# Patient Record
Sex: FEMALE | Race: BLACK OR AFRICAN AMERICAN | Employment: FULL TIME
[De-identification: names, ages, dates, MRNs, and addresses within clinical notes are randomized per-mention and may not be internally consistent; named-entity substitution may affect disease eponyms.]

---

## 2017-01-06 ENCOUNTER — OFFICE VISIT (OUTPATIENT)
Dept: FAMILY MEDICINE CLINIC | Facility: CLINIC | Age: 37
End: 2017-01-06

## 2017-01-06 VITALS
HEART RATE: 71 BPM | BODY MASS INDEX: 34.37 KG/M2 | SYSTOLIC BLOOD PRESSURE: 112 MMHG | WEIGHT: 194 LBS | DIASTOLIC BLOOD PRESSURE: 70 MMHG

## 2017-01-06 DIAGNOSIS — G43.909 MIGRAINE WITHOUT STATUS MIGRAINOSUS, NOT INTRACTABLE, UNSPECIFIED MIGRAINE TYPE: Primary | ICD-10-CM

## 2017-01-06 PROCEDURE — 99213 OFFICE O/P EST LOW 20 MIN: CPT | Performed by: FAMILY MEDICINE

## 2017-01-06 RX ORDER — METOPROLOL SUCCINATE 50 MG/1
50 TABLET, EXTENDED RELEASE ORAL DAILY
Qty: 30 TABLET | Refills: 3 | Status: SHIPPED | OUTPATIENT
Start: 2017-01-06 | End: 2017-02-06 | Stop reason: SDUPTHER

## 2017-01-06 ASSESSMENT — ENCOUNTER SYMPTOMS
BACK PAIN: 0
SINUS PRESSURE: 0
SHORTNESS OF BREATH: 0
SORE THROAT: 0
DIARRHEA: 0
COUGH: 0
VOMITING: 0
NAUSEA: 0

## 2017-02-06 RX ORDER — METOPROLOL SUCCINATE 50 MG/1
50 TABLET, EXTENDED RELEASE ORAL DAILY
Qty: 30 TABLET | Refills: 11 | Status: SHIPPED | OUTPATIENT
Start: 2017-02-06 | End: 2018-02-17

## 2017-03-08 ENCOUNTER — OFFICE VISIT (OUTPATIENT)
Dept: OBGYN | Facility: CLINIC | Age: 37
End: 2017-03-08

## 2017-03-08 ENCOUNTER — HOSPITAL ENCOUNTER (OUTPATIENT)
Age: 37
Setting detail: SPECIMEN
Discharge: HOME | End: 2017-03-08
Payer: COMMERCIAL

## 2017-03-08 VITALS
WEIGHT: 190 LBS | HEART RATE: 74 BPM | HEIGHT: 63 IN | BODY MASS INDEX: 33.66 KG/M2 | DIASTOLIC BLOOD PRESSURE: 75 MMHG | SYSTOLIC BLOOD PRESSURE: 110 MMHG

## 2017-03-08 DIAGNOSIS — Z01.419 ENCOUNTER FOR GYNECOLOGICAL EXAMINATION WITHOUT ABNORMAL FINDING: Primary | ICD-10-CM

## 2017-03-08 PROCEDURE — 99385 PREV VISIT NEW AGE 18-39: CPT | Performed by: OBSTETRICS & GYNECOLOGY

## 2017-03-08 ASSESSMENT — ENCOUNTER SYMPTOMS
BLURRED VISION: 0
COUGH: 0
PHOTOPHOBIA: 0
ABDOMINAL PAIN: 0
SHORTNESS OF BREATH: 0
HEARTBURN: 0

## 2017-03-14 LAB
HPV SAMPLE: NORMAL
HPV SOURCE: NORMAL
HPV, GENOTYPE 16: NOT DETECTED
HPV, GENOTYPE 18: NOT DETECTED
HPV, HIGH RISK OTHER: NOT DETECTED
HPV, INTERPRETATION: NORMAL

## 2017-03-15 LAB — CYTOLOGY REPORT: NORMAL

## 2017-03-22 ENCOUNTER — OFFICE VISIT (OUTPATIENT)
Dept: FAMILY MEDICINE CLINIC | Age: 37
End: 2017-03-22
Payer: COMMERCIAL

## 2017-03-22 VITALS
WEIGHT: 199 LBS | HEART RATE: 58 BPM | BODY MASS INDEX: 35.82 KG/M2 | SYSTOLIC BLOOD PRESSURE: 120 MMHG | DIASTOLIC BLOOD PRESSURE: 70 MMHG

## 2017-03-22 DIAGNOSIS — R94.31 ABNORMAL EKG: ICD-10-CM

## 2017-03-22 DIAGNOSIS — R07.9 CHEST PAIN, UNSPECIFIED TYPE: Primary | ICD-10-CM

## 2017-03-22 PROCEDURE — G8417 CALC BMI ABV UP PARAM F/U: HCPCS | Performed by: FAMILY MEDICINE

## 2017-03-22 PROCEDURE — 1036F TOBACCO NON-USER: CPT | Performed by: FAMILY MEDICINE

## 2017-03-22 PROCEDURE — 93000 ELECTROCARDIOGRAM COMPLETE: CPT | Performed by: FAMILY MEDICINE

## 2017-03-22 PROCEDURE — G8427 DOCREV CUR MEDS BY ELIG CLIN: HCPCS | Performed by: FAMILY MEDICINE

## 2017-03-22 PROCEDURE — 99212 OFFICE O/P EST SF 10 MIN: CPT | Performed by: FAMILY MEDICINE

## 2017-03-22 PROCEDURE — G8484 FLU IMMUNIZE NO ADMIN: HCPCS | Performed by: FAMILY MEDICINE

## 2017-03-23 ENCOUNTER — HOSPITAL ENCOUNTER (OUTPATIENT)
Dept: GENERAL RADIOLOGY | Age: 37
Discharge: HOME | End: 2017-03-23
Payer: COMMERCIAL

## 2017-03-23 ENCOUNTER — HOSPITAL ENCOUNTER (OUTPATIENT)
Age: 37
Discharge: HOME | End: 2017-03-23
Payer: COMMERCIAL

## 2017-03-23 DIAGNOSIS — R07.9 CHEST PAIN, UNSPECIFIED TYPE: ICD-10-CM

## 2017-03-23 DIAGNOSIS — R94.31 ABNORMAL EKG: ICD-10-CM

## 2017-03-23 PROCEDURE — 71020 XR CHEST STANDARD TWO VW: CPT

## 2017-03-28 ENCOUNTER — TELEPHONE (OUTPATIENT)
Dept: FAMILY MEDICINE CLINIC | Age: 37
End: 2017-03-28

## 2017-03-28 DIAGNOSIS — R07.9 CHEST PAIN, UNSPECIFIED TYPE: Primary | ICD-10-CM

## 2017-03-31 ENCOUNTER — HOSPITAL ENCOUNTER (OUTPATIENT)
Dept: NUCLEAR MEDICINE | Age: 37
Discharge: HOME | End: 2017-03-31
Payer: COMMERCIAL

## 2017-03-31 ENCOUNTER — HOSPITAL ENCOUNTER (OUTPATIENT)
Dept: NON INVASIVE DIAGNOSTICS | Age: 37
Discharge: HOME | End: 2017-03-31
Payer: COMMERCIAL

## 2017-03-31 DIAGNOSIS — R07.9 CHEST PAIN, UNSPECIFIED TYPE: ICD-10-CM

## 2017-03-31 DIAGNOSIS — R94.31 ABNORMAL EKG: ICD-10-CM

## 2017-03-31 LAB
LV EF: 56 %
LVEF MODALITY: NORMAL

## 2017-03-31 PROCEDURE — 93017 CV STRESS TEST TRACING ONLY: CPT | Performed by: NURSE PRACTITIONER

## 2017-03-31 PROCEDURE — 78452 HT MUSCLE IMAGE SPECT MULT: CPT

## 2017-03-31 PROCEDURE — 3430000000 HC RX DIAGNOSTIC RADIOPHARMACEUTICAL: Performed by: FAMILY MEDICINE

## 2017-03-31 PROCEDURE — 6360000002 HC RX W HCPCS: Performed by: FAMILY MEDICINE

## 2017-03-31 PROCEDURE — A9500 TC99M SESTAMIBI: HCPCS | Performed by: FAMILY MEDICINE

## 2017-03-31 RX ORDER — SODIUM CHLORIDE 0.9 % (FLUSH) 0.9 %
10 SYRINGE (ML) INJECTION PRN
Status: DISCONTINUED | OUTPATIENT
Start: 2017-03-31 | End: 2017-03-31

## 2017-03-31 RX ORDER — NITROGLYCERIN 0.4 MG/1
0.4 TABLET SUBLINGUAL EVERY 5 MIN PRN
Status: DISCONTINUED | OUTPATIENT
Start: 2017-03-31 | End: 2017-03-31

## 2017-03-31 RX ORDER — SODIUM CHLORIDE 9 MG/ML
INJECTION, SOLUTION INTRAVENOUS ONCE
Status: COMPLETED | OUTPATIENT
Start: 2017-03-31 | End: 2017-03-31

## 2017-03-31 RX ORDER — AMINOPHYLLINE DIHYDRATE 25 MG/ML
100 INJECTION, SOLUTION INTRAVENOUS
Status: COMPLETED | OUTPATIENT
Start: 2017-03-31 | End: 2017-03-31

## 2017-03-31 RX ORDER — SODIUM CHLORIDE 0.9 % (FLUSH) 0.9 %
10 SYRINGE (ML) INJECTION 2 TIMES DAILY
Status: DISCONTINUED | OUTPATIENT
Start: 2017-03-31 | End: 2017-04-03 | Stop reason: HOSPADM

## 2017-03-31 RX ADMIN — SODIUM CHLORIDE: 9 INJECTION, SOLUTION INTRAVENOUS at 10:16

## 2017-03-31 RX ADMIN — REGADENOSON 0.4 MG: 0.08 INJECTION, SOLUTION INTRAVENOUS at 10:32

## 2017-03-31 RX ADMIN — TETRAKIS(2-METHOXYISOBUTYLISOCYANIDE)COPPER(I) TETRAFLUOROBORATE 42 MILLICURIE: 1 INJECTION, POWDER, LYOPHILIZED, FOR SOLUTION INTRAVENOUS at 12:09

## 2017-03-31 RX ADMIN — AMINOPHYLLINE 100 MG: 25 INJECTION, SOLUTION INTRAVENOUS at 10:35

## 2017-03-31 RX ADMIN — TETRAKIS(2-METHOXYISOBUTYLISOCYANIDE)COPPER(I) TETRAFLUOROBORATE 14.6 MILLICURIE: 1 INJECTION, POWDER, LYOPHILIZED, FOR SOLUTION INTRAVENOUS at 12:09

## 2017-03-31 RX ADMIN — Medication 10 ML: at 10:16

## 2017-04-04 ENCOUNTER — OFFICE VISIT (OUTPATIENT)
Dept: FAMILY MEDICINE CLINIC | Age: 37
End: 2017-04-04
Payer: COMMERCIAL

## 2017-04-04 VITALS
SYSTOLIC BLOOD PRESSURE: 120 MMHG | DIASTOLIC BLOOD PRESSURE: 70 MMHG | HEART RATE: 76 BPM | BODY MASS INDEX: 35.1 KG/M2 | WEIGHT: 195 LBS

## 2017-04-04 DIAGNOSIS — R07.9 CHEST PAIN, UNSPECIFIED TYPE: ICD-10-CM

## 2017-04-04 DIAGNOSIS — G44.209 ACUTE NON INTRACTABLE TENSION-TYPE HEADACHE: Primary | ICD-10-CM

## 2017-04-04 PROCEDURE — 1036F TOBACCO NON-USER: CPT | Performed by: FAMILY MEDICINE

## 2017-04-04 PROCEDURE — G8417 CALC BMI ABV UP PARAM F/U: HCPCS | Performed by: FAMILY MEDICINE

## 2017-04-04 PROCEDURE — 99213 OFFICE O/P EST LOW 20 MIN: CPT | Performed by: FAMILY MEDICINE

## 2017-04-04 PROCEDURE — G8427 DOCREV CUR MEDS BY ELIG CLIN: HCPCS | Performed by: FAMILY MEDICINE

## 2017-04-04 RX ORDER — IBUPROFEN 800 MG/1
800 TABLET ORAL EVERY 8 HOURS PRN
Qty: 60 TABLET | Refills: 3 | Status: SHIPPED | OUTPATIENT
Start: 2017-04-04 | End: 2017-07-07 | Stop reason: SDUPTHER

## 2017-04-04 ASSESSMENT — ENCOUNTER SYMPTOMS
CHEST TIGHTNESS: 1
SHORTNESS OF BREATH: 1
VOMITING: 0
DIARRHEA: 0
COUGH: 0
SINUS PRESSURE: 0
NAUSEA: 0
BACK PAIN: 0
PHOTOPHOBIA: 1
SORE THROAT: 0

## 2017-04-07 ENCOUNTER — HOSPITAL ENCOUNTER (OUTPATIENT)
Dept: NON INVASIVE DIAGNOSTICS | Age: 37
Discharge: HOME | End: 2017-04-07
Payer: COMMERCIAL

## 2017-04-07 LAB
LV EF: 60 %
LVEF MODALITY: NORMAL

## 2017-04-07 PROCEDURE — 93306 TTE W/DOPPLER COMPLETE: CPT

## 2017-05-09 ENCOUNTER — OFFICE VISIT (OUTPATIENT)
Dept: OBGYN CLINIC | Age: 37
End: 2017-05-09
Payer: COMMERCIAL

## 2017-05-09 VITALS
HEART RATE: 62 BPM | SYSTOLIC BLOOD PRESSURE: 104 MMHG | BODY MASS INDEX: 34.96 KG/M2 | WEIGHT: 190 LBS | HEIGHT: 62 IN | DIASTOLIC BLOOD PRESSURE: 72 MMHG

## 2017-05-09 DIAGNOSIS — R10.2 PELVIC PAIN IN FEMALE: Primary | ICD-10-CM

## 2017-05-09 PROCEDURE — 99213 OFFICE O/P EST LOW 20 MIN: CPT | Performed by: OBSTETRICS & GYNECOLOGY

## 2017-05-09 PROCEDURE — G8417 CALC BMI ABV UP PARAM F/U: HCPCS | Performed by: OBSTETRICS & GYNECOLOGY

## 2017-05-09 PROCEDURE — G8427 DOCREV CUR MEDS BY ELIG CLIN: HCPCS | Performed by: OBSTETRICS & GYNECOLOGY

## 2017-05-09 PROCEDURE — 1036F TOBACCO NON-USER: CPT | Performed by: OBSTETRICS & GYNECOLOGY

## 2017-07-07 DIAGNOSIS — R07.9 CHEST PAIN, UNSPECIFIED TYPE: ICD-10-CM

## 2017-07-07 RX ORDER — IBUPROFEN 800 MG/1
TABLET ORAL
Qty: 90 TABLET | Refills: 5 | Status: SHIPPED | OUTPATIENT
Start: 2017-07-07 | End: 2017-11-01 | Stop reason: SDUPTHER

## 2017-10-31 ENCOUNTER — OFFICE VISIT (OUTPATIENT)
Dept: FAMILY MEDICINE CLINIC | Age: 37
End: 2017-10-31
Payer: COMMERCIAL

## 2017-10-31 VITALS
DIASTOLIC BLOOD PRESSURE: 80 MMHG | HEART RATE: 111 BPM | BODY MASS INDEX: 35.12 KG/M2 | TEMPERATURE: 98.5 F | SYSTOLIC BLOOD PRESSURE: 130 MMHG | WEIGHT: 192 LBS

## 2017-10-31 DIAGNOSIS — B34.9 VIRAL SYNDROME: Primary | ICD-10-CM

## 2017-10-31 PROCEDURE — G8417 CALC BMI ABV UP PARAM F/U: HCPCS | Performed by: FAMILY MEDICINE

## 2017-10-31 PROCEDURE — 99213 OFFICE O/P EST LOW 20 MIN: CPT | Performed by: FAMILY MEDICINE

## 2017-10-31 PROCEDURE — G8427 DOCREV CUR MEDS BY ELIG CLIN: HCPCS | Performed by: FAMILY MEDICINE

## 2017-10-31 PROCEDURE — G8484 FLU IMMUNIZE NO ADMIN: HCPCS | Performed by: FAMILY MEDICINE

## 2017-10-31 PROCEDURE — 1036F TOBACCO NON-USER: CPT | Performed by: FAMILY MEDICINE

## 2017-10-31 RX ORDER — OSELTAMIVIR PHOSPHATE 75 MG/1
75 CAPSULE ORAL 2 TIMES DAILY
Qty: 20 CAPSULE | Refills: 0 | Status: SHIPPED | OUTPATIENT
Start: 2017-10-31 | End: 2017-11-10

## 2017-10-31 ASSESSMENT — ENCOUNTER SYMPTOMS
SORE THROAT: 0
BACK PAIN: 0
NAUSEA: 0
DIARRHEA: 0
VOMITING: 0
COUGH: 0
SHORTNESS OF BREATH: 0
SINUS PRESSURE: 0

## 2017-10-31 ASSESSMENT — PATIENT HEALTH QUESTIONNAIRE - PHQ9
1. LITTLE INTEREST OR PLEASURE IN DOING THINGS: 0
SUM OF ALL RESPONSES TO PHQ9 QUESTIONS 1 & 2: 0
SUM OF ALL RESPONSES TO PHQ QUESTIONS 1-9: 0
2. FEELING DOWN, DEPRESSED OR HOPELESS: 0

## 2017-10-31 NOTE — PROGRESS NOTES
bruit is not present. Cardiovascular: Exam reveals no gallop and no friction rub. No murmur heard. Pulmonary/Chest: No respiratory distress. She has no wheezes. She has no rales. She exhibits no tenderness. Musculoskeletal: She exhibits no edema. Lymphadenopathy:     She has no cervical adenopathy. Neurological: She is alert and oriented to person, place, and time. No cranial nerve deficit. Skin: No rash noted. She is not diaphoretic. Psychiatric: She has a normal mood and affect. Her behavior is normal. Judgment and thought content normal.       Assessment:      1. Viral syndrome  oseltamivir (TAMIFLU) 75 MG capsule    HYDROcodone-homatropine (HYCODAN) 5-1.5 MG/5ML syrup         Plan:      No Follow-up on file. No orders of the defined types were placed in this encounter. Orders Placed This Encounter   Medications    oseltamivir (TAMIFLU) 75 MG capsule     Sig: Take 1 capsule by mouth 2 times daily for 10 days     Dispense:  20 capsule     Refill:  0    HYDROcodone-homatropine (HYCODAN) 5-1.5 MG/5ML syrup     Sig: Take 5 mLs by mouth every 6 hours as needed (cough) .      Dispense:  180 mL     Refill:  0

## 2017-11-01 DIAGNOSIS — R07.9 CHEST PAIN, UNSPECIFIED TYPE: ICD-10-CM

## 2017-11-01 RX ORDER — IBUPROFEN 800 MG/1
TABLET ORAL
Qty: 90 TABLET | Refills: 1 | Status: SHIPPED | OUTPATIENT
Start: 2017-11-01 | End: 2017-12-06 | Stop reason: SDUPTHER

## 2017-11-01 NOTE — TELEPHONE ENCOUNTER
Health Maintenance   Topic Date Due    HIV screen  03/19/1994    DTaP/Tdap/Td vaccine (1 - Tdap) 03/19/1998    Flu vaccine (1) 09/01/2020 (Originally 9/1/2017)    Cervical cancer screen  03/08/2022       Hemoglobin A1C (%)   Date Value   02/07/2012 5.5             ( goal A1C is < 7)   No results found for: LABMICR  LDL Cholesterol (mg/dL)   Date Value   12/07/2016 119       (goal LDL is <100)   AST (U/L)   Date Value   12/07/2016 11     ALT (U/L)   Date Value   12/07/2016 8     BUN (mg/dL)   Date Value   12/07/2016 7     BP Readings from Last 3 Encounters:   10/31/17 130/80   05/09/17 104/72   04/04/17 120/70          (goal 120/80)    All Future Testing planned in [facility]  Lab Frequency Next Occurrence       Next Visit Date:  No future appointments. There is no problem list on file for this patient.

## 2017-11-02 ENCOUNTER — TELEPHONE (OUTPATIENT)
Dept: FAMILY MEDICINE CLINIC | Age: 37
End: 2017-11-02

## 2017-11-21 ENCOUNTER — TELEPHONE (OUTPATIENT)
Dept: FAMILY MEDICINE CLINIC | Age: 37
End: 2017-11-21

## 2017-12-06 DIAGNOSIS — R07.9 CHEST PAIN, UNSPECIFIED TYPE: ICD-10-CM

## 2017-12-06 RX ORDER — IBUPROFEN 800 MG/1
TABLET ORAL
Qty: 90 TABLET | Refills: 1 | Status: SHIPPED | OUTPATIENT
Start: 2017-12-06 | End: 2018-03-30 | Stop reason: ALTCHOICE

## 2018-01-26 RX ORDER — NORELGESTROMIN AND ETHINYL ESTRADIOL 150; 35 UG/D; UG/D
1 PATCH TRANSDERMAL
Qty: 3 PATCH | Refills: 5 | Status: SHIPPED | OUTPATIENT
Start: 2018-01-26 | End: 2018-07-09 | Stop reason: SDUPTHER

## 2018-02-17 ENCOUNTER — HOSPITAL ENCOUNTER (EMERGENCY)
Age: 38
Discharge: HOME | End: 2018-02-17
Attending: EMERGENCY MEDICINE
Payer: COMMERCIAL

## 2018-02-17 VITALS
WEIGHT: 170 LBS | HEART RATE: 80 BPM | SYSTOLIC BLOOD PRESSURE: 143 MMHG | BODY MASS INDEX: 31.28 KG/M2 | HEIGHT: 62 IN | OXYGEN SATURATION: 97 % | RESPIRATION RATE: 16 BRPM | DIASTOLIC BLOOD PRESSURE: 94 MMHG

## 2018-02-17 DIAGNOSIS — L50.9 URTICARIA: Primary | ICD-10-CM

## 2018-02-17 PROCEDURE — 6370000000 HC RX 637 (ALT 250 FOR IP): Performed by: STUDENT IN AN ORGANIZED HEALTH CARE EDUCATION/TRAINING PROGRAM

## 2018-02-17 PROCEDURE — 99282 EMERGENCY DEPT VISIT SF MDM: CPT

## 2018-02-17 RX ORDER — DIPHENHYDRAMINE HCL 25 MG
50 TABLET ORAL ONCE
Status: COMPLETED | OUTPATIENT
Start: 2018-02-17 | End: 2018-02-17

## 2018-02-17 RX ORDER — DIPHENHYDRAMINE HCL 25 MG
50 CAPSULE ORAL EVERY 6 HOURS PRN
Qty: 20 CAPSULE | Refills: 0 | Status: SHIPPED | OUTPATIENT
Start: 2018-02-17 | End: 2018-02-27

## 2018-02-17 RX ADMIN — DIPHENHYDRAMINE HCL 50 MG: 25 TABLET ORAL at 09:20

## 2018-02-17 ASSESSMENT — ENCOUNTER SYMPTOMS
SHORTNESS OF BREATH: 0
VOMITING: 0
ABDOMINAL PAIN: 0
NAUSEA: 0

## 2018-02-17 NOTE — ED PROVIDER NOTES
findings are as noted. For APC cases I have personally evaluated and examined the patient in conjunction with the APC and agree with the treatment plan and disposition of the patient as recorded by the APC.     Fabrizio Garnett MD  Attending Emergency  Physician        Janae Posey MD  02/17/18 [id number]

## 2018-02-17 NOTE — ED PROVIDER NOTES
Topics Concern    Not on file     Social History Narrative    No narrative on file       Family History   Problem Relation Age of Onset    Kidney Disease Mother     Heart Disease Mother     Heart Disease Father     Colon Cancer Father     Diabetes Father        Allergies:  Amoxicillin    Home Medications:  Prior to Admission medications    Medication Sig Start Date End Date Taking? Authorizing Provider   diphenhydrAMINE (BENADRYL) 25 MG capsule Take 2 capsules by mouth every 6 hours as needed for Itching 2/17/18 2/27/18 Yes Suzanna Aparicio DO   Latoya Josue 150-35 MCG/24HR PLACE 1 PATCH ONTO THE SKIN EVERY 7 DAYS 1/26/18  Yes Anuj Shrestha MD   ibuprofen (ADVIL;MOTRIN) 800 MG tablet TAKE 1 TABLET BY MOUTH EVERY 8 HOURS AS NEEDED FOR PAIN 12/6/17  Yes Hitesh Head MD       REVIEW OF SYSTEMS    (2-9 systems for level 4, 10 or more for level 5)      Review of Systems   Constitutional: Negative for chills and fever. HENT: Negative. Respiratory: Negative for shortness of breath. Cardiovascular: Negative for chest pain. Gastrointestinal: Negative for abdominal pain, nausea and vomiting. Skin: Positive for rash. Neurological: Negative for headaches. PHYSICAL EXAM   (up to 7 for level 4, 8 or more for level 5)      INITIAL VITALS:   BP (!) 143/94   Pulse 80   Resp 16   Ht 5' 2\" (1.575 m)   Wt 170 lb (77.1 kg)   SpO2 97%   BMI 31.09 kg/m²     Physical Exam   Constitutional: She is oriented to person, place, and time. She appears well-developed and well-nourished. No distress. HENT:   Head: Normocephalic and atraumatic. Eyes: EOM are normal.   Neck: Normal range of motion. Cardiovascular: Normal rate. Pulmonary/Chest: Effort normal. No respiratory distress. She has no wheezes. She has no rales. Abdominal: Soft. She exhibits no distension. There is no tenderness. There is no rebound. Musculoskeletal: Normal range of motion.    Neurological: She is alert and oriented to person, REFERRED TO:  Yajaira Hsu MD  [address] [place] [address] Gavin Crandall [facility]  [phone]            DISCHARGE MEDICATIONS:  New Prescriptions    DIPHENHYDRAMINE (BENADRYL) 25 MG CAPSULE    Take 2 capsules by mouth every 6 hours as needed for Itching       Marli Bliss DO  Emergency Medicine Resident    (Please note that portions of this note were completed with a voice recognition program.  Efforts were made to edit the dictations but occasionally words are mis-transcribed.)        Marli Bliss DO  02/17/18 [id number]

## 2018-03-13 ENCOUNTER — OFFICE VISIT (OUTPATIENT)
Dept: FAMILY MEDICINE CLINIC | Age: 38
End: 2018-03-13
Payer: COMMERCIAL

## 2018-03-13 VITALS
DIASTOLIC BLOOD PRESSURE: 82 MMHG | HEIGHT: 62 IN | RESPIRATION RATE: 16 BRPM | WEIGHT: 180 LBS | BODY MASS INDEX: 33.13 KG/M2 | SYSTOLIC BLOOD PRESSURE: 130 MMHG

## 2018-03-13 DIAGNOSIS — K21.9 GASTROESOPHAGEAL REFLUX DISEASE, ESOPHAGITIS PRESENCE NOT SPECIFIED: ICD-10-CM

## 2018-03-13 DIAGNOSIS — L50.8 URTICARIA, ACUTE: Primary | ICD-10-CM

## 2018-03-13 PROCEDURE — G8427 DOCREV CUR MEDS BY ELIG CLIN: HCPCS | Performed by: NURSE PRACTITIONER

## 2018-03-13 PROCEDURE — 1036F TOBACCO NON-USER: CPT | Performed by: NURSE PRACTITIONER

## 2018-03-13 PROCEDURE — G8417 CALC BMI ABV UP PARAM F/U: HCPCS | Performed by: NURSE PRACTITIONER

## 2018-03-13 PROCEDURE — G8484 FLU IMMUNIZE NO ADMIN: HCPCS | Performed by: NURSE PRACTITIONER

## 2018-03-13 PROCEDURE — 99213 OFFICE O/P EST LOW 20 MIN: CPT | Performed by: NURSE PRACTITIONER

## 2018-03-13 RX ORDER — AMOXICILLIN 500 MG/1
CAPSULE ORAL
Refills: 0 | COMMUNITY
Start: 2018-01-26 | End: 2018-05-08

## 2018-03-13 RX ORDER — HYDROCODONE BITARTRATE AND ACETAMINOPHEN 5; 325 MG/1; MG/1
TABLET ORAL
Refills: 0 | COMMUNITY
Start: 2018-01-26 | End: 2018-03-30 | Stop reason: ALTCHOICE

## 2018-03-13 RX ORDER — PREDNISONE 20 MG/1
TABLET ORAL
Qty: 20 TABLET | Refills: 0 | Status: SHIPPED | OUTPATIENT
Start: 2018-03-13 | End: 2018-07-25 | Stop reason: ALTCHOICE

## 2018-03-13 RX ORDER — FAMOTIDINE 20 MG/1
20 TABLET, FILM COATED ORAL 2 TIMES DAILY
Qty: 60 TABLET | Refills: 3 | Status: SHIPPED | OUTPATIENT
Start: 2018-03-13 | End: 2018-03-30 | Stop reason: ALTCHOICE

## 2018-03-13 ASSESSMENT — ENCOUNTER SYMPTOMS
SHORTNESS OF BREATH: 0
WHEEZING: 0
VOMITING: 0
DIARRHEA: 0
TROUBLE SWALLOWING: 0
CHEST TIGHTNESS: 0
CONSTIPATION: 0
NAUSEA: 0

## 2018-03-13 NOTE — PROGRESS NOTES
Visit Information    Have you changed or started any medications since your last visit including any over-the-counter medicines, vitamins, or herbal medicines? no   Are you having any side effects from any of your medications? -  no  Have you stopped taking any of your medications? Is so, why? -  no    Have you seen any other physician or provider since your last visit? No  Have you had any other diagnostic tests since your last visit? No  Have you been seen in the emergency room and/or had an admission to a hospital since we last saw you? No  Have you had your routine dental cleaning in the past 6 months? no    Have you activated your [employer] account? If not, what are your barriers?  No:      Patient Care Team:  Paula Pritchett MD as PCP - General (Family Medicine)    Medical History Review  Past Medical, Family, and Social History reviewed and does not contribute to the patient presenting condition    Health Maintenance   Topic Date Due    HIV screen  03/19/1994    DTaP/Tdap/Td vaccine (1 - Tdap) 03/19/1998    Flu vaccine (1) 09/01/2020 (Originally 9/1/2017)    Cervical cancer screen  03/08/2022

## 2018-03-13 NOTE — PATIENT INSTRUCTIONS
lightheaded or suddenly feel weak, confused, or restless. ?Call your doctor now or seek immediate medical care if:  ? · You have symptoms of an allergic reaction, such as:  ¨ A rash or hives (raised, red areas on the skin). ¨ Itching. ¨ Swelling. ¨ Belly pain, nausea, or vomiting. ? · You get hives after you start a new medicine. ? · Hives have not gone away after 24 hours. ? Watch closely for changes in your health, and be sure to contact your doctor if:  ? · You do not get better as expected. Where can you learn more? Go to [email or web address] and sign in to your [employer] account. Enter N140 in the [employer] box to learn more about \"Hives: Care Instructions. \"     If you do not have an account, please click on the \"Sign Up Now\" link. Current as of: March 20, 2017  Content Version: 11.5  © 3444-2857 [employer]. Care instructions adapted under license by [facility] Hospital[facility].RU [facility] ([facility]). If you have questions about a medical condition or this instruction, always ask your healthcare professional. [place] [address] any warranty or liability for your use of this information. Patient Education          prednisone  Pronunciation:  PRED ni sone  Brand:  Magdi, Sterapred, Sterapred 12 DAY, Sterapred DS, Sterapred DS 12 DAY  What is the most important information I should know about prednisone? Prednisone treats many different conditions such as allergic disorders, skin conditions, ulcerative colitis, arthritis, lupus, psoriasis, or breathing disorders. You should not take prednisone if you have a fungal infection anywhere in your body. Steroid medication can weaken your immune system, making it easier for you to get an infection. Avoid being near people who are sick or have infections. Do not receive a \"live\" vaccine while using prednisone.   Call your doctor at once if you have shortness of breath, severe pain in your upper stomach, bloody or tarry stools, severe depression, changes in personality or behavior, vision problems, or eye pain. You should not stop using prednisone suddenly. Follow your doctor's instructions about tapering your dose. What is prednisone? Prednisone is a steroid. Prednisone prevents the release of substances in the body that cause inflammation. Prednisone also suppresses the immune system. Prednisone is used as an anti-inflammatory or an immunosuppressant medication. Prednisone treats many different conditions such as allergic disorders, skin conditions, ulcerative colitis, arthritis, lupus, psoriasis, or breathing disorders. Prednisone may also be used for purposes not listed in this medication guide. What should I discuss with my healthcare provider before taking prednisone? You should not use this medication if you are allergic to prednisone, or if you have a fungal infection anywhere in your body. Steroid medication can weaken your immune system, making it easier for you to get an infection or worsening an infection you already have or have recently had. Tell your doctor about any illness or infection you have had within the past several weeks. To make sure prednisone is safe for you, tell your doctor if you have:  · any illness that causes diarrhea;  · liver disease (such as cirrhosis);  · kidney disease;  · heart disease, high blood pressure, low levels of potassium in your blood;  · a thyroid disorder;  · diabetes;  · a history of malaria;  · tuberculosis;  · osteoporosis;  · glaucoma, cataracts, or herpes infection of the eyes;  · stomach ulcers, ulcerative colitis, or a history of stomach bleeding;  · a muscle disorder such as myasthenia gravis; or  · depression or mental illness. Long-term use of steroids may lead to bone loss (osteoporosis), especially if you smoke, if you do not exercise, if you do not get enough vitamin D or calcium in your diet, or if you have a family history of osteoporosis.  Talk with your doctor about your risk of osteoporosis. Prednisone can cause low birth weight or birth defects if you take the medicine during your first trimester. Tell your doctor if you are pregnant or plan to become pregnant while using this medication. Use effective birth control. Prednisone can pass into breast milk and may harm a nursing baby. Tell your doctor if you are breast-feeding a baby. Steroids can affect growth in children. Talk with your doctor if you think your child is not growing at a normal rate while using this medication. How should I take prednisone? Follow all directions on your prescription label. Your doctor may occasionally change your dose to make sure you get the best results. Do not take this medicine in larger or smaller amounts or for longer than recommended. Take with food. Your dosage needs may change if you have any unusual stress such as a serious illness, fever or infection, or if you have surgery or a medical emergency. Do not change your medication dose or schedule without your doctor's advice. Measure liquid medicine with a special dose-measuring spoon or medicine cup. If you do not have a dose-measuring device, ask your pharmacist for one. Do not crush, chew, or break a delayed-release tablet. Swallow it whole. While using prednisone, you may need frequent blood tests at your doctor's office. Your blood pressure may also need to be checked. This medication can cause unusual results with certain medical tests. Tell any doctor who treats you that you are using prednisone. You should not stop using prednisone suddenly. Follow your doctor's instructions about tapering your dose. Wear a medical alert tag or carry an ID card stating that you take prednisone. Any medical care provider who treats you should know that you are using a steroid. Store at room temperature away from moisture and heat. What happens if I miss a dose? Take the missed dose as soon as you remember.  Skip the missed urination, leg discomfort, muscle weakness or limp feeling); or  · dangerously high blood pressure (severe headache, blurred vision, buzzing in your ears, anxiety, confusion, chest pain, shortness of breath, uneven heartbeats, seizure). Other common side effects may include:  · sleep problems (insomnia), mood changes;  · increased appetite, gradual weight gain;  · acne, increased sweating, dry skin, thinning skin, bruising or discoloration;  · slow wound healing;  · headache, dizziness, spinning sensation;  · nausea, stomach pain, bloating; or  · changes in the shape or location of body fat (especially in your arms, legs, face, neck, breasts, and waist). This is not a complete list of side effects and others may occur. Call your doctor for medical advice about side effects. You may report side effects to FDA at 4-736-FDA-3961. What other drugs will affect prednisone? Many drugs can interact with prednisone. Not all possible interactions are listed here.  Tell your doctor about all your medications and any you start or stop using during treatment with prednisone, especially:  · amphotericin B;  · cyclosporine;  · digoxin, digitalis;  · Bullhead's wort;  · an antibiotic such as clarithromycin or telithromycin;  · antifungal medication such as itraconazole, ketoconazole, posaconazole, voriconazole;  · birth control pills and other hormones;  · a blood thinner such as warfarin, Coumadin;  · a diuretic or \"water pill\";  · the hepatitis C medications boceprevir or telaprevir;  · HIV or AIDS medicine such as atazanavir, delavirdine, efavirenz, fosamprenavir, indinavir, nelfinavir, nevirapine, ritonavir, saquinavir;  · insulin or diabetes medications you take by mouth;  · a non-steroidal anti-inflammatory drug (NSAID) such as aspirin, ibuprofen (Advil, Motrin), naproxen (Aleve), celecoxib, diclofenac, indomethacin, meloxicam, and others;  · seizure medications such as carbamazepine, fosphenytoin, oxcarbazepine, warnings, drug interactions, allergic reactions, or adverse effects. If you have questions about the drugs you are taking, check with your doctor, nurse or pharmacist.  Copyright 2261-3169 [address]. Version: 9.01. Revision date: 2/13/2013. Care instructions adapted under license by [facility] ([facility]). If you have questions about a medical condition or this instruction, always ask your healthcare professional. [place] [address] any warranty or liability for your use of this information. Patient Education   Patient Education          cetirizine  Pronunciation:  se COHEN a zeen  Brand: All Day Allergy, All Day Allergy Children's, Indoor/Outdoor Allergy Relief, ZyrTEC  What is the most important information I should know about cetirizine? Follow all directions on your medicine label and package. Tell each of your healthcare providers about all your medical conditions, allergies, and all medicines you use. What is cetirizine? Cetirizine is an antihistamine that reduces the effects of natural chemical histamine in the body. Histamine can produce symptoms of sneezing, itching, watery eyes, and runny nose. Cetirizine is used to treat cold or allergy symptoms such as sneezing, itching, watery eyes, or runny nose. Cetirizine is also used to treat itching and swelling caused by chronic urticaria (hives). Cetirizine may also be used for purposes not listed in this medication guide. What should I discuss with my healthcare provider before taking cetirizine? You should not use cetirizine if you are allergic to it. Ask a doctor or pharmacist if it is safe for you to take this medicine if you have any medical conditions. It is not known whether cetirizine will harm an unborn baby. Ask a doctor before using this medicine if you are pregnant. It is not known whether cetirizine passes into breast milk or if it could harm a nursing baby.  Ask a doctor before using this medicine if you are your doctor. Stop using this medication and get emergency medical help if you think you have used too much medicine, or if you have any of these signs of an allergic reaction: hives; difficulty breathing; swelling of your face, lips, tongue, or throat. Tell your doctor if your symptoms do not improve, or if they get worse after you start taking fexofenadine. Never use more than the recommended dose. What is fexofenadine? Fexofenadine is an antihistamine that reduces the effects of natural chemical histamine in the body. Histamine can produce symptoms of sneezing, itching, watery eyes, and runny nose. Fexofenadine is used to treat the symptoms of seasonal allergies (hay fever) in adults and children. Fexofenadine is also used to treat skin itching and hives caused by a condition called chronic idiopathic urticaria in adults and children. Fexofenadine may also be used for purposes not listed in this medication guide. What should I discuss with my healthcare provider before taking fexofenadine? Ask a doctor or pharmacist if it is safe for you to take this medicine if you are allergic to any drugs, or if you have kidney disease. FDA pregnancy category C. It is not known whether fexofenadine will harm an unborn baby. Tell your doctor if you are pregnant or plan to become pregnant while using this medication. It is not known whether fexofenadine passes into breast milk or if it could harm a nursing baby. Do not use this medication without telling your doctor if you are breast-feeding a baby. Fexofenadine tablets  and capsules  may be used to treat seasonal allergy symptoms in children who are at least 10years old. Fexofenadine oral suspension (liquid) may be used in children ages 2 through 6. When treating chronic idiopathic urticaria, the liquid may be used in children as young as 2 months old. Do not give this medicine to a child without medical advice. How should I take fexofenadine?   Use exactly as drowsiness. What should I avoid while taking fexofenadine? Avoid using antacids within 15 minutes before or after taking a medication that contains fexofenadine. Antacids can make it harder for your body to absorb this medication. Fruit juices and certain antacids can make it harder for your body to absorb fexofenadine. Do not take fexofenadine with fruit juice (such as apple, orange, or grapefruit). Avoid taking any other cold or allergy medicines unless your doctor has told you to. What are the possible side effects of fexofenadine? Get emergency medical help if you have any of these signs of an allergic reaction: hives; difficulty breathing; swelling of your face, lips, tongue, or throat. Stop taking fexofenadine and call your doctor if you have fever, chills, body aches, cough, or other flu symptoms. Less serious side effects may include:  · nausea, diarrhea, upset stomach;  · menstrual cramps;  · drowsiness, tired feeling;  · headache; or  · muscle or back pain. This is not a complete list of side effects and others may occur. Call your doctor for medical advice about side effects. You may report side effects to FDA at 3-823-FDA-1378. What other drugs will affect fexofenadine? Before taking this medication, tell your doctor if you regularly use other medicines that make you sleepy (such as cold or allergy medicine, sedatives, narcotic pain medicine, sleeping pills, muscle relaxers, and medicine for seizures, depression, or anxiety). They can add to sleepiness caused by fexofenadine. Tell your doctor about all other medicines you use, especially:  · ketoconazole (Nizoral); or  · erythromycin (E.E.S., EryPed, Jamar-Tab, Erythrocin, Pediazole). This list is not complete and other drugs may interact with fexofenadine. Tell your doctor about all medications you use. This includes prescription, over-the-counter, vitamin, and herbal products. Do not start a new medication without telling your doctor.   Where can I get more information? Your pharmacist can provide more information about fexofenadine. Remember, keep this and all other medicines out of the reach of children, never share your medicines with others, and use this medication only for the indication prescribed. Every effort has been made to ensure that the information provided by [address] is accurate, up-to-date, and complete, but no guarantee is made to that effect. Drug information contained herein may be time sensitive. [facility] information has been compiled for use by healthcare practitioners and consumers in the [place] and therefore [facility] does not warrant that uses outside of the [place] are appropriate, unless specifically indicated otherwise. [facility]'s drug information does not endorse drugs, diagnose patients or recommend therapy. [facility]'s drug information is an informational resource designed to assist licensed healthcare practitioners in caring for their patients and/or to serve consumers viewing this service as a supplement to, and not a substitute for, the expertise, skill, knowledge and judgment of healthcare practitioners. The absence of a warning for a given drug or drug combination in no way should be construed to indicate that the drug or drug combination is safe, effective or appropriate for any given patient. [facility] does not assume any responsibility for any aspect of healthcare administered with the aid of information [facility] provides. The information contained herein is not intended to cover all possible uses, directions, precautions, warnings, drug interactions, allergic reactions, or adverse effects. If you have questions about the drugs you are taking, check with your doctor, nurse or pharmacist.  Copyright 0545-6790 [address]: 10.02. Revision date: 2/22/2012. Care instructions adapted under license by [facility] ([facility]).  If you have questions about a medical condition or this

## 2018-03-13 NOTE — PROGRESS NOTES
Ada Rendon is a 44 y.o. female who presents today for her medical conditions/complaints as noted below. Ada Rendon is here today c/o Urticaria (all over body - a couple of weeks ago)      HPI:     GLADIS Colón presents today with complaints of a hive-like rash and complaints of indigestion. She was seen in the ER 2/17/18 for a hive-like reaction and treated with Benadryl. She states the rash starts as a burning feeling and then she has circular swelling like rash that tends to come and go. She states the rash has been present for 3-4 weeks. She states she had used a new laundry detergent prior to being seen in the ER and has since changed back to the new detergent and re-washed all of her clothes. She states the only area spared from the rash has been her face and the soles of her feet, she states the rash has been present on the palmar surface of her hand and wrist. She states the heaviest area of the rash is her forearms and legs. She denies any new medicine or foods. She states the benadryl was making her too sleepy and she is unable to work while taking the medication. Denies any problems with swallowing, numb/tingling lips, difficulty breathing. She states the rash was present this morning but when it goes away there is no evidence of the rash once the hives disappear. She brings in pictures on her cellphone of the rash. She states the rash is worse after a hot shower. She also complains of indigestion symptoms stating she has awoken in her sleep with the feeling of a bulge in her chest that she is unable to burp up or move. She has taken TUMS without relief. She states she usually just waits and the feeling will pass although she is unable to go back to sleep. She states she does not eat greasy or spicy foods before bed. She states they have recently switched to an air fryer for their fried foods. She denies chest pain, diaphoresis, nausea or vomiting.       Subjective:   Review of days, then 2 tabs x 3 days, 1 tab x 3 days then 1/2 tab for 3 days  Dispense: 20 tablet; Refill: 0    2. Gastroesophageal reflux disease, esophagitis presence not specified  Will treat with pepcid to see if symptoms resolve as well as use pepcid for it's antihistamine properties. - famotidine (PEPCID) 20 MG tablet; Take 1 tablet by mouth 2 times daily  Dispense: 60 tablet; Refill: 3    Follow-up as needed. Zheng Mon is agreeable to this plan of care.   Electronically signed by Nadeen Faria CNP on 3/13/2018 at 4:39 PM

## 2018-03-30 ENCOUNTER — OFFICE VISIT (OUTPATIENT)
Dept: OBGYN CLINIC | Age: 38
End: 2018-03-30
Payer: COMMERCIAL

## 2018-03-30 ENCOUNTER — HOSPITAL ENCOUNTER (OUTPATIENT)
Age: 38
Setting detail: SPECIMEN
Discharge: HOME | End: 2018-03-30
Payer: COMMERCIAL

## 2018-03-30 VITALS
HEIGHT: 62 IN | BODY MASS INDEX: 31.28 KG/M2 | SYSTOLIC BLOOD PRESSURE: 112 MMHG | WEIGHT: 170 LBS | DIASTOLIC BLOOD PRESSURE: 66 MMHG | HEART RATE: 80 BPM

## 2018-03-30 DIAGNOSIS — Z01.419 WOMEN'S ANNUAL ROUTINE GYNECOLOGICAL EXAMINATION: Primary | ICD-10-CM

## 2018-03-30 PROCEDURE — 99395 PREV VISIT EST AGE 18-39: CPT | Performed by: OBSTETRICS & GYNECOLOGY

## 2018-03-30 ASSESSMENT — ENCOUNTER SYMPTOMS
ABDOMINAL PAIN: 0
SHORTNESS OF BREATH: 0
COUGH: 0

## 2018-03-30 NOTE — PROGRESS NOTES
Nontender, no erythema or irritation noted. No discharge. Normal vaginal discharge, uterus anterior, 4-6 weeks without CMT. Adnexa nontender without abnormal masses bilaterally. Rectal Exam: Omitted. Extremities: Nontender without clubbing, cyanosis or edema. F.R.O.M. Neurologic Exam: Grossly intact without noted sensorimotor deficits and oriented x 3. Assessment/Plan:   Unremarkable annual Gyn exam.    Cystic vulvar lesion - benign in appearance, will observe since not causing any issues. Cervical Cytology Evaluation begins at 24years old. If Negative Cytology, Follow-up screening per current guidelines. Mammograms every 1 year starting at 36. Birth control and barrier recommendations discussed. STD counseling and prevention reviewed.   Pt to follow up for annual exam in 1 year    Jessica Ahn MD  [address]

## 2018-04-16 LAB — CYTOLOGY REPORT: NORMAL

## 2018-05-07 ENCOUNTER — TELEPHONE (OUTPATIENT)
Dept: FAMILY MEDICINE CLINIC | Age: 38
End: 2018-05-07

## 2018-05-08 ENCOUNTER — OFFICE VISIT (OUTPATIENT)
Dept: FAMILY MEDICINE CLINIC | Age: 38
End: 2018-05-08
Payer: COMMERCIAL

## 2018-05-08 VITALS
DIASTOLIC BLOOD PRESSURE: 72 MMHG | WEIGHT: 184 LBS | BODY MASS INDEX: 33.65 KG/M2 | HEART RATE: 66 BPM | SYSTOLIC BLOOD PRESSURE: 122 MMHG

## 2018-05-08 DIAGNOSIS — L50.9 URTICARIA: Primary | ICD-10-CM

## 2018-05-08 PROCEDURE — G8427 DOCREV CUR MEDS BY ELIG CLIN: HCPCS | Performed by: FAMILY MEDICINE

## 2018-05-08 PROCEDURE — 99213 OFFICE O/P EST LOW 20 MIN: CPT | Performed by: FAMILY MEDICINE

## 2018-05-08 PROCEDURE — 1036F TOBACCO NON-USER: CPT | Performed by: FAMILY MEDICINE

## 2018-05-08 PROCEDURE — G8417 CALC BMI ABV UP PARAM F/U: HCPCS | Performed by: FAMILY MEDICINE

## 2018-05-08 RX ORDER — IBUPROFEN 800 MG/1
800 TABLET ORAL EVERY 6 HOURS PRN
COMMUNITY
End: 2018-06-05 | Stop reason: SDUPTHER

## 2018-05-08 ASSESSMENT — ENCOUNTER SYMPTOMS
SINUS PRESSURE: 0
NAUSEA: 0
SORE THROAT: 0
BACK PAIN: 0
SHORTNESS OF BREATH: 0
WHEEZING: 0
VOMITING: 0
DIARRHEA: 0
COUGH: 0

## 2018-05-17 ENCOUNTER — HOSPITAL ENCOUNTER (OUTPATIENT)
Age: 38
Setting detail: SPECIMEN
Discharge: HOME | End: 2018-05-17
Payer: COMMERCIAL

## 2018-05-17 DIAGNOSIS — L50.9 URTICARIA: ICD-10-CM

## 2018-05-17 LAB
ABSOLUTE EOS #: 0.14 K/UL (ref 0–0.44)
ABSOLUTE IMMATURE GRANULOCYTE: <0.03 K/UL (ref 0–0.3)
ABSOLUTE LYMPH #: 3.05 K/UL (ref 1.1–3.7)
ABSOLUTE MONO #: 0.58 K/UL (ref 0.1–1.2)
ALBUMIN SERPL-MCNC: 3.5 G/DL (ref 3.5–5.2)
ALBUMIN/GLOBULIN RATIO: 0.9 (ref 1–2.5)
ALP BLD-CCNC: 50 U/L (ref 35–104)
ALT SERPL-CCNC: 6 U/L (ref 5–33)
ANION GAP SERPL CALCULATED.3IONS-SCNC: 13 MMOL/L (ref 9–17)
AST SERPL-CCNC: 12 U/L
BASOPHILS # BLD: 0 % (ref 0–2)
BASOPHILS ABSOLUTE: <0.03 K/UL (ref 0–0.2)
BILIRUB SERPL-MCNC: 0.3 MG/DL (ref 0.3–1.2)
BUN BLDV-MCNC: 9 MG/DL (ref 6–20)
BUN/CREAT BLD: ABNORMAL (ref 9–20)
CALCIUM SERPL-MCNC: 8.7 MG/DL (ref 8.6–10.4)
CHLORIDE BLD-SCNC: 106 MMOL/L (ref 98–107)
CHOLESTEROL/HDL RATIO: 2.1
CHOLESTEROL: 201 MG/DL
CO2: 20 MMOL/L (ref 20–31)
CREAT SERPL-MCNC: 0.74 MG/DL (ref 0.5–0.9)
DIFFERENTIAL TYPE: ABNORMAL
EOSINOPHILS RELATIVE PERCENT: 2 % (ref 1–4)
GFR AFRICAN AMERICAN: >60 ML/MIN
GFR NON-AFRICAN AMERICAN: >60 ML/MIN
GFR SERPL CREATININE-BSD FRML MDRD: ABNORMAL ML/MIN/{1.73_M2}
GFR SERPL CREATININE-BSD FRML MDRD: ABNORMAL ML/MIN/{1.73_M2}
GLUCOSE BLD-MCNC: 77 MG/DL (ref 70–99)
HCT VFR BLD CALC: 38.9 % (ref 36.3–47.1)
HDLC SERPL-MCNC: 94 MG/DL
HEMOGLOBIN: 12.7 G/DL (ref 11.9–15.1)
IMMATURE GRANULOCYTES: 0 %
LDL CHOLESTEROL: 97 MG/DL (ref 0–130)
LYMPHOCYTES # BLD: 44 % (ref 24–43)
MCH RBC QN AUTO: 29.1 PG (ref 25.2–33.5)
MCHC RBC AUTO-ENTMCNC: 32.6 G/DL (ref 28.4–34.8)
MCV RBC AUTO: 89.2 FL (ref 82.6–102.9)
MONOCYTES # BLD: 8 % (ref 3–12)
NRBC AUTOMATED: 0 PER 100 WBC
PDW BLD-RTO: 13 % (ref 11.8–14.4)
PLATELET # BLD: 376 K/UL (ref 138–453)
PLATELET ESTIMATE: ABNORMAL
PMV BLD AUTO: 10.7 FL (ref 8.1–13.5)
POTASSIUM SERPL-SCNC: 4.7 MMOL/L (ref 3.7–5.3)
RBC # BLD: 4.36 M/UL (ref 3.95–5.11)
RBC # BLD: ABNORMAL 10*6/UL
SEDIMENTATION RATE, ERYTHROCYTE: 20 MM (ref 0–20)
SEG NEUTROPHILS: 46 % (ref 36–65)
SEGMENTED NEUTROPHILS ABSOLUTE COUNT: 3.18 K/UL (ref 1.5–8.1)
SODIUM BLD-SCNC: 139 MMOL/L (ref 135–144)
TOTAL PROTEIN: 7.5 G/DL (ref 6.4–8.3)
TRIGL SERPL-MCNC: 50 MG/DL
TSH SERPL DL<=0.05 MIU/L-ACNC: 1.77 MIU/L (ref 0.3–5)
VLDLC SERPL CALC-MCNC: ABNORMAL MG/DL (ref 1–30)
WBC # BLD: 7 K/UL (ref 3.5–11.3)
WBC # BLD: ABNORMAL 10*3/UL

## 2018-06-05 RX ORDER — IBUPROFEN 800 MG/1
800 TABLET ORAL EVERY 6 HOURS PRN
Qty: 120 TABLET | Refills: 5 | Status: SHIPPED | OUTPATIENT
Start: 2018-06-05 | End: 2018-11-14 | Stop reason: SDUPTHER

## 2018-07-25 ENCOUNTER — OFFICE VISIT (OUTPATIENT)
Dept: FAMILY MEDICINE CLINIC | Age: 38
End: 2018-07-25
Payer: COMMERCIAL

## 2018-07-25 ENCOUNTER — HOSPITAL ENCOUNTER (OUTPATIENT)
Dept: GENERAL RADIOLOGY | Age: 38
Discharge: HOME | End: 2018-07-27
Payer: COMMERCIAL

## 2018-07-25 ENCOUNTER — HOSPITAL ENCOUNTER (OUTPATIENT)
Age: 38
Discharge: HOME | End: 2018-07-27
Payer: COMMERCIAL

## 2018-07-25 VITALS
HEART RATE: 80 BPM | BODY MASS INDEX: 34.2 KG/M2 | SYSTOLIC BLOOD PRESSURE: 122 MMHG | OXYGEN SATURATION: 99 % | WEIGHT: 187 LBS | DIASTOLIC BLOOD PRESSURE: 72 MMHG | RESPIRATION RATE: 16 BRPM

## 2018-07-25 DIAGNOSIS — S93.402A SPRAIN OF LEFT ANKLE, UNSPECIFIED LIGAMENT, INITIAL ENCOUNTER: Primary | ICD-10-CM

## 2018-07-25 DIAGNOSIS — S93.402A SPRAIN OF LEFT ANKLE, UNSPECIFIED LIGAMENT, INITIAL ENCOUNTER: ICD-10-CM

## 2018-07-25 PROCEDURE — G8427 DOCREV CUR MEDS BY ELIG CLIN: HCPCS | Performed by: NURSE PRACTITIONER

## 2018-07-25 PROCEDURE — G8417 CALC BMI ABV UP PARAM F/U: HCPCS | Performed by: NURSE PRACTITIONER

## 2018-07-25 PROCEDURE — 1036F TOBACCO NON-USER: CPT | Performed by: NURSE PRACTITIONER

## 2018-07-25 PROCEDURE — 73610 X-RAY EXAM OF ANKLE: CPT

## 2018-07-25 PROCEDURE — 99213 OFFICE O/P EST LOW 20 MIN: CPT | Performed by: NURSE PRACTITIONER

## 2018-07-25 RX ORDER — FAMOTIDINE 20 MG/1
TABLET, FILM COATED ORAL
COMMUNITY
Start: 2018-06-02 | End: 2018-07-25 | Stop reason: ALTCHOICE

## 2018-07-25 ASSESSMENT — ENCOUNTER SYMPTOMS
GASTROINTESTINAL NEGATIVE: 1
ALLERGIC/IMMUNOLOGIC NEGATIVE: 1
EYES NEGATIVE: 1
COLOR CHANGE: 0
RESPIRATORY NEGATIVE: 1

## 2018-07-25 NOTE — PROGRESS NOTES
[facility]  [address]  [place], [facility]  Dept: [phone]    Visit Information    Have you changed or started any medications since your last visit including any over-the-counter medicines, vitamins, or herbal medicines? no   Are you having any side effects from any of your medications? -  no  Have you stopped taking any of your medications? Is so, why? -  no    Have you seen any other physician or provider since your last visit? No  Have you had any other diagnostic tests since your last visit? Yes - Records Obtained  Have you been seen in the emergency room and/or had an admission to a hospital since we last saw you? Yes - Records Obtained  Have you had your routine dental cleaning in the past 6 months? no    Have you activated your [employer] account? If not, what are your barriers? No:      Patient Care Team:  Phoebe Beach MD as PCP - General (Family Medicine)    Medical History Review  Past Medical, Family, and Social History reviewed and does not contribute to the patient presenting condition    Health Maintenance   Topic Date Due    HIV screen  03/19/1994    DTaP/Tdap/Td vaccine (1 - Tdap) 03/19/1998    Flu vaccine (1) 09/01/2020 (Originally 9/1/2018)    Cervical cancer screen  03/30/2023           Rayo Holloway is a 43 y.o. female who presents today for her medical conditions/complaints as noted below. Rayo Holloway is here today c/o Ankle Problem (left ankle pain - onset lastnight, rolled  it)      No past medical history on file. No past surgical history on file.     Family History   Problem Relation Age of Onset    Kidney Disease Mother     Heart Disease Mother     Heart Disease Father     Colon Cancer Father     Diabetes Father        Social History   Substance Use Topics    Smoking status: Never Smoker    Smokeless tobacco: Never Used    Alcohol use No      Current Outpatient Prescriptions   Medication Sig Dispense Refill    ibuprofen Mouth/Throat: Oropharynx is clear and moist.   Eyes: Conjunctivae are normal. Pupils are equal, round, and reactive to light. Right eye exhibits no discharge. Left eye exhibits no discharge. No scleral icterus. Neck: Normal range of motion. Neck supple. No tracheal deviation present. Cardiovascular: Normal rate and regular rhythm. Pulmonary/Chest: Effort normal. No accessory muscle usage or stridor. No tachypnea. She has no decreased breath sounds. She has no rhonchi. Abdominal: Soft. Bowel sounds are normal.   Musculoskeletal: She exhibits no edema. Left ankle: She exhibits decreased range of motion. She exhibits no swelling, no ecchymosis, no deformity, no laceration and normal pulse. Tenderness. Feet:    CMS intact     Neurological: She is alert and oriented to person, place, and time. She has normal strength. She displays no tremor. She displays no seizure activity. Gait (slight limp) abnormal. GCS eye subscore is 4. GCS verbal subscore is 5. GCS motor subscore is 6. Skin: Skin is warm, dry and intact. No rash noted. She is not diaphoretic. No erythema. No pallor. Psychiatric: She has a normal mood and affect. Her speech is normal and behavior is normal. Judgment and thought content normal. Cognition and memory are normal.         Assessment:         1. Sprain of left ankle, unspecified ligament, initial encounter        Plan:     1. Sprain of left ankle, unspecified ligament, initial encounter    - XR ANKLE LEFT (MIN 3 VIEWS); Future    RICE, xray r/o fracture, brace for support, motrin for pain  Declines crutches  Advance activity as tolerated       Discussed use, benefit, and side effects of prescribed medications. All patient questions answered. Pt voiced understanding. Reviewed health maintenance. Instructed to continue current medications, diet and exercise. Patient agreed with treatment plan. Follow up as directed.      Electronically signed by RUDOLPH Lau CNP on

## 2018-07-25 NOTE — LETTER
[facility] [address] [address]  Phone: [phone]  Fax: [phone]    RUDOLPH Hardy CNP        July 25, 2018     Patient: Billie Gordon   YOB: 1980   Date of Visit: 7/25/2018       To Whom It May Concern: It is my medical opinion that Niraj Richter be excused from work,   7/25/18 and 7/26/18, due to ankle injury. May return to work,   7/27/18, no restrictions. If you have any questions or concerns, please don't hesitate to call.     Sincerely,        RUDOLPH Hardy CNP/yash

## 2018-11-14 RX ORDER — IBUPROFEN 800 MG/1
800 TABLET ORAL EVERY 6 HOURS PRN
Qty: 120 TABLET | Refills: 2 | Status: SHIPPED | OUTPATIENT
Start: 2018-11-14 | End: 2019-05-03 | Stop reason: SDUPTHER

## 2018-11-14 NOTE — TELEPHONE ENCOUNTER
Health Maintenance   Topic Date Due    HIV screen  03/19/1994    DTaP/Tdap/Td vaccine (1 - Tdap) 03/19/1998    Flu vaccine (1) 09/01/2020 (Originally 9/1/2018)    Cervical cancer screen  03/30/2023       Hemoglobin A1C (%)   Date Value   02/07/2012 5.5             ( goal A1C is < 7)   No results found for: LABMICR  LDL Cholesterol (mg/dL)   Date Value   05/17/2018 97       (goal LDL is <100)   AST (U/L)   Date Value   05/17/2018 12     ALT (U/L)   Date Value   05/17/2018 6     BUN (mg/dL)   Date Value   05/17/2018 9     BP Readings from Last 3 Encounters:   07/25/18 122/72   05/08/18 122/72   03/30/18 112/66          (goal 120/80)    All Future Testing planned in [facility]  Lab Frequency Next Occurrence   PAP Smear Once 04/30/2018       Next Visit Date:  No future appointments. There is no problem list on file for this patient.

## 2018-12-12 ENCOUNTER — OFFICE VISIT (OUTPATIENT)
Dept: OBGYN CLINIC | Age: 38
End: 2018-12-12
Payer: COMMERCIAL

## 2018-12-12 VITALS
HEIGHT: 62 IN | HEART RATE: 67 BPM | DIASTOLIC BLOOD PRESSURE: 80 MMHG | BODY MASS INDEX: 34.47 KG/M2 | SYSTOLIC BLOOD PRESSURE: 134 MMHG | WEIGHT: 187.3 LBS

## 2018-12-12 DIAGNOSIS — N94.6 DYSMENORRHEA: Primary | ICD-10-CM

## 2018-12-12 DIAGNOSIS — R10.2 PELVIC PAIN: ICD-10-CM

## 2018-12-12 PROCEDURE — 99213 OFFICE O/P EST LOW 20 MIN: CPT | Performed by: OBSTETRICS & GYNECOLOGY

## 2018-12-12 NOTE — LETTER
[facility]  [address] [facility] [address]  Phone: [phone]  Fax: [phone]    Erin Arizmendi, DO        December 12, 2018     Patient: Caden Hua   YOB: 1980   Date of Visit: 12/12/2018       To Whom It May Concern: It is my medical opinion that Yen Lindo may return to work on 12/13/2018. If you have any questions or concerns, please don't hesitate to call.     Sincerely,        See-Yin So, DO

## 2019-02-04 ENCOUNTER — TELEPHONE (OUTPATIENT)
Dept: FAMILY MEDICINE CLINIC | Age: 39
End: 2019-02-04

## 2019-02-04 DIAGNOSIS — R10.2 PELVIC PAIN: ICD-10-CM

## 2019-02-04 DIAGNOSIS — N94.6 DYSMENORRHEA: Primary | ICD-10-CM

## 2019-05-06 RX ORDER — IBUPROFEN 800 MG/1
800 TABLET ORAL EVERY 6 HOURS PRN
Qty: 120 TABLET | Refills: 2 | Status: SHIPPED | OUTPATIENT
Start: 2019-05-06 | End: 2021-01-04 | Stop reason: SDUPTHER

## 2019-07-10 ENCOUNTER — OFFICE VISIT (OUTPATIENT)
Dept: FAMILY MEDICINE CLINIC | Age: 39
End: 2019-07-10
Payer: COMMERCIAL

## 2019-07-10 VITALS
OXYGEN SATURATION: 96 % | BODY MASS INDEX: 36.18 KG/M2 | SYSTOLIC BLOOD PRESSURE: 130 MMHG | HEART RATE: 64 BPM | WEIGHT: 196.6 LBS | HEIGHT: 62 IN | RESPIRATION RATE: 18 BRPM | DIASTOLIC BLOOD PRESSURE: 70 MMHG

## 2019-07-10 DIAGNOSIS — G43.901 MIGRAINE WITH STATUS MIGRAINOSUS, NOT INTRACTABLE, UNSPECIFIED MIGRAINE TYPE: Primary | ICD-10-CM

## 2019-07-10 DIAGNOSIS — R03.0 ELEVATED BLOOD PRESSURE, SITUATIONAL: ICD-10-CM

## 2019-07-10 PROCEDURE — 99214 OFFICE O/P EST MOD 30 MIN: CPT | Performed by: NURSE PRACTITIONER

## 2019-07-10 PROCEDURE — G8427 DOCREV CUR MEDS BY ELIG CLIN: HCPCS | Performed by: NURSE PRACTITIONER

## 2019-07-10 PROCEDURE — G8417 CALC BMI ABV UP PARAM F/U: HCPCS | Performed by: NURSE PRACTITIONER

## 2019-07-10 PROCEDURE — 1036F TOBACCO NON-USER: CPT | Performed by: NURSE PRACTITIONER

## 2019-07-10 RX ORDER — TOPIRAMATE 50 MG/1
TABLET, FILM COATED ORAL
Qty: 60 TABLET | Refills: 3 | Status: SHIPPED | OUTPATIENT
Start: 2019-07-10 | End: 2020-01-02 | Stop reason: ALTCHOICE

## 2019-07-10 RX ORDER — BUTALBITAL, ACETAMINOPHEN AND CAFFEINE 50; 325; 40 MG/1; MG/1; MG/1
1 TABLET ORAL EVERY 4 HOURS PRN
Qty: 30 TABLET | Refills: 0 | Status: SHIPPED | OUTPATIENT
Start: 2019-07-10 | End: 2021-01-04

## 2019-07-10 ASSESSMENT — ENCOUNTER SYMPTOMS
EYE REDNESS: 0
SWOLLEN GLANDS: 0
SINUS PRESSURE: 0
EYE WATERING: 0
EYE PAIN: 0
PHOTOPHOBIA: 1
SORE THROAT: 0
COLOR CHANGE: 0
VISUAL CHANGE: 1
VOMITING: 1
RESPIRATORY NEGATIVE: 1
ALLERGIC/IMMUNOLOGIC NEGATIVE: 1
FACIAL SWEATING: 0
COUGH: 0
ABDOMINAL PAIN: 0
SCALP TENDERNESS: 0
RHINORRHEA: 0
NAUSEA: 1

## 2019-07-10 ASSESSMENT — PATIENT HEALTH QUESTIONNAIRE - PHQ9
SUM OF ALL RESPONSES TO PHQ9 QUESTIONS 1 & 2: 0
2. FEELING DOWN, DEPRESSED OR HOPELESS: 0
SUM OF ALL RESPONSES TO PHQ QUESTIONS 1-9: 0
SUM OF ALL RESPONSES TO PHQ QUESTIONS 1-9: 0
1. LITTLE INTEREST OR PLEASURE IN DOING THINGS: 0

## 2019-09-20 ENCOUNTER — OFFICE VISIT (OUTPATIENT)
Dept: FAMILY MEDICINE CLINIC | Age: 39
End: 2019-09-20
Payer: COMMERCIAL

## 2019-09-20 VITALS
BODY MASS INDEX: 36.65 KG/M2 | HEART RATE: 82 BPM | WEIGHT: 200.4 LBS | SYSTOLIC BLOOD PRESSURE: 122 MMHG | DIASTOLIC BLOOD PRESSURE: 70 MMHG | OXYGEN SATURATION: 98 %

## 2019-09-20 DIAGNOSIS — G43.109 MIGRAINE WITH AURA AND WITHOUT STATUS MIGRAINOSUS, NOT INTRACTABLE: Primary | ICD-10-CM

## 2019-09-20 PROCEDURE — 1036F TOBACCO NON-USER: CPT | Performed by: FAMILY MEDICINE

## 2019-09-20 PROCEDURE — 99213 OFFICE O/P EST LOW 20 MIN: CPT | Performed by: FAMILY MEDICINE

## 2019-09-20 PROCEDURE — G8427 DOCREV CUR MEDS BY ELIG CLIN: HCPCS | Performed by: FAMILY MEDICINE

## 2019-09-20 PROCEDURE — G8417 CALC BMI ABV UP PARAM F/U: HCPCS | Performed by: FAMILY MEDICINE

## 2019-09-20 RX ORDER — SUMATRIPTAN 100 MG/1
100 TABLET, FILM COATED ORAL
Qty: 9 TABLET | Refills: 3 | Status: SHIPPED | OUTPATIENT
Start: 2019-09-20 | End: 2020-01-02 | Stop reason: ALTCHOICE

## 2019-09-20 ASSESSMENT — ENCOUNTER SYMPTOMS
DIARRHEA: 0
VOMITING: 0
SORE THROAT: 0
SHORTNESS OF BREATH: 0
PHOTOPHOBIA: 1
NAUSEA: 0
COUGH: 0
SINUS PRESSURE: 0
BACK PAIN: 0

## 2019-09-20 NOTE — PROGRESS NOTES
tryptans so we will try this first and if tolerates and efficacious will consider alternative prophylactic options eg nadolol or other. She did question about botox injections and offered to consult someone who could offer this but she wishes to see how this works first before going this route.

## 2019-10-03 ENCOUNTER — TELEPHONE (OUTPATIENT)
Dept: FAMILY MEDICINE CLINIC | Age: 39
End: 2019-10-03

## 2019-10-04 DIAGNOSIS — G43.901 MIGRAINE WITH STATUS MIGRAINOSUS, NOT INTRACTABLE, UNSPECIFIED MIGRAINE TYPE: Primary | ICD-10-CM

## 2020-01-02 ENCOUNTER — OFFICE VISIT (OUTPATIENT)
Dept: FAMILY MEDICINE CLINIC | Age: 40
End: 2020-01-02
Payer: COMMERCIAL

## 2020-01-02 VITALS
DIASTOLIC BLOOD PRESSURE: 80 MMHG | WEIGHT: 183.2 LBS | SYSTOLIC BLOOD PRESSURE: 110 MMHG | OXYGEN SATURATION: 99 % | HEART RATE: 88 BPM | TEMPERATURE: 98 F | BODY MASS INDEX: 33.51 KG/M2

## 2020-01-02 PROCEDURE — 99213 OFFICE O/P EST LOW 20 MIN: CPT | Performed by: FAMILY MEDICINE

## 2020-01-02 PROCEDURE — G8484 FLU IMMUNIZE NO ADMIN: HCPCS | Performed by: FAMILY MEDICINE

## 2020-01-02 PROCEDURE — G8417 CALC BMI ABV UP PARAM F/U: HCPCS | Performed by: FAMILY MEDICINE

## 2020-01-02 PROCEDURE — 1036F TOBACCO NON-USER: CPT | Performed by: FAMILY MEDICINE

## 2020-01-02 PROCEDURE — G8427 DOCREV CUR MEDS BY ELIG CLIN: HCPCS | Performed by: FAMILY MEDICINE

## 2020-01-02 RX ORDER — AZITHROMYCIN 250 MG/1
TABLET, FILM COATED ORAL
Qty: 1 PACKET | Refills: 0 | Status: SHIPPED | OUTPATIENT
Start: 2020-01-02 | End: 2020-01-08 | Stop reason: ALTCHOICE

## 2020-01-02 RX ORDER — PREDNISONE 20 MG/1
TABLET ORAL
Qty: 20 TABLET | Refills: 0 | Status: SHIPPED | OUTPATIENT
Start: 2020-01-02 | End: 2020-01-08 | Stop reason: ALTCHOICE

## 2020-01-02 ASSESSMENT — ENCOUNTER SYMPTOMS
SINUS PRESSURE: 0
RHINORRHEA: 1
VOMITING: 1
DIARRHEA: 0
SORE THROAT: 1
SHORTNESS OF BREATH: 1
COUGH: 1
NAUSEA: 0
BACK PAIN: 0
SINUS PAIN: 1

## 2020-01-02 ASSESSMENT — PATIENT HEALTH QUESTIONNAIRE - PHQ9
2. FEELING DOWN, DEPRESSED OR HOPELESS: 0
SUM OF ALL RESPONSES TO PHQ9 QUESTIONS 1 & 2: 0
1. LITTLE INTEREST OR PLEASURE IN DOING THINGS: 0
SUM OF ALL RESPONSES TO PHQ QUESTIONS 1-9: 0
SUM OF ALL RESPONSES TO PHQ QUESTIONS 1-9: 0

## 2020-01-02 NOTE — PROGRESS NOTES
Lalo Stephen is a 35 y.o. female who presents todayfor her medical conditions/complaints as noted below. Lalo Stephen is here today c/oFatigue; Nausea & Vomiting; Cough; Fever; and Chills    URI for six days having severe paroxysms of cough that ppt emesis.  :         HPI        No past medical history on file. No past surgical history on file. Family History   Problem Relation Age of Onset    Kidney Disease Mother     Heart Disease Mother     Heart Disease Father     Colon Cancer Father     Diabetes Father      Social History     Tobacco Use    Smoking status: Never Smoker    Smokeless tobacco: Never Used   Substance Use Topics    Alcohol use: No      Current Outpatient Medications   Medication Sig Dispense Refill    predniSONE (DELTASONE) 20 MG tablet Take 3 tabs daily for 3 days, then 2 a day for 3 days, then 1 a day for 3 days, then 1/2 a day for 3 days. 20 tablet 0    azithromycin (ZITHROMAX Z-SANDEEP) 250 MG tablet Take 2 pills on day one then one pill daily til gone. 1 packet 0    butalbital-acetaminophen-caffeine (FIORICET, ESGIC) -40 MG per tablet Take 1 tablet by mouth every 4 hours as needed for Headaches or Migraine 30 tablet 0    ibuprofen (ADVIL;MOTRIN) 800 MG tablet TAKE 1 TABLET BY MOUTH EVERY 6 HOURS AS NEEDED FOR PAIN 120 tablet 2    norelgestromin-ethinyl estradiol (ORTHO EVRA) 150-35 MCG/24HR Place 1 patch onto the skin once a week 3 patch 12     No current facility-administered medications for this visit. Allergies   Allergen Reactions    Amoxicillin     Penicillins          Subjective:   Review of Systems   Constitutional: Positive for fever. Negative for chills and diaphoresis. HENT: Positive for congestion, rhinorrhea, sinus pain and sore throat. Negative for sinus pressure. Eyes: Negative for visual disturbance. Respiratory: Positive for cough and shortness of breath. Cardiovascular: Negative for chest pain and leg swelling. Gastrointestinal: Positive for vomiting. Negative for diarrhea and nausea. Genitourinary: Negative for dysuria, menstrual problem, urgency and vaginal discharge. Musculoskeletal: Negative for arthralgias, back pain and myalgias. Skin: Negative for rash. Neurological: Negative for weakness, numbness and headaches. Psychiatric/Behavioral: Positive for sleep disturbance.       :   /80   Pulse 88   Temp 98 °F (36.7 °C)   Wt 183 lb 3.2 oz (83.1 kg)   SpO2 99%   BMI 33.51 kg/m²     Physical Exam  Constitutional:       General: She is not in acute distress. Appearance: She is well-developed. She is not diaphoretic. HENT:      Head: Normocephalic and atraumatic. Right Ear: Tympanic membrane and ear canal normal.      Left Ear: Tympanic membrane and ear canal normal.      Mouth/Throat:      Pharynx: No oropharyngeal exudate. Eyes:      General: No scleral icterus. Neck:      Musculoskeletal: Neck supple. Vascular: No carotid bruit. Cardiovascular:      Heart sounds: No murmur. No friction rub. No gallop. Pulmonary:      Effort: No respiratory distress. Breath sounds: Examination of the right-lower field reveals decreased breath sounds. Examination of the left-lower field reveals decreased breath sounds. Decreased breath sounds present. No wheezing or rales. Chest:      Chest wall: No tenderness. Lymphadenopathy:      Cervical: No cervical adenopathy. Skin:     Findings: No rash. Neurological:      Mental Status: She is alert and oriented to person, place, and time. Cranial Nerves: No cranial nerve deficit. Psychiatric:         Behavior: Behavior normal.         Thought Content: Thought content normal.         Judgment: Judgment normal.         Assessment:       Diagnosis Orders   1. Bronchitis           Plan:      No follow-ups on file. No orders of the defined types were placed in this encounter.     Orders Placed This Encounter   Medications    predniSONE (DELTASONE) 20 MG tablet     Sig: Take 3 tabs daily for 3 days, then 2 a day for 3 days, then 1 a day for 3 days, then 1/2 a day for 3 days. Dispense:  20 tablet     Refill:  0    azithromycin (ZITHROMAX Z-SANDEEP) 250 MG tablet     Sig: Take 2 pills on day one then one pill daily til gone. Dispense:  1 packet     Refill:  0     Tx for both purulence of cough and the reactiveness of her cough and airway inflammation.

## 2020-01-08 ENCOUNTER — OFFICE VISIT (OUTPATIENT)
Dept: NEUROLOGY | Age: 40
End: 2020-01-08
Payer: COMMERCIAL

## 2020-01-08 VITALS
HEART RATE: 62 BPM | WEIGHT: 190 LBS | BODY MASS INDEX: 33.66 KG/M2 | DIASTOLIC BLOOD PRESSURE: 87 MMHG | HEIGHT: 63 IN | SYSTOLIC BLOOD PRESSURE: 129 MMHG

## 2020-01-08 PROBLEM — G43.019 INTRACTABLE MIGRAINE WITHOUT AURA AND WITHOUT STATUS MIGRAINOSUS: Status: ACTIVE | Noted: 2020-01-08

## 2020-01-08 PROCEDURE — 1036F TOBACCO NON-USER: CPT | Performed by: NURSE PRACTITIONER

## 2020-01-08 PROCEDURE — G8427 DOCREV CUR MEDS BY ELIG CLIN: HCPCS | Performed by: NURSE PRACTITIONER

## 2020-01-08 PROCEDURE — 99204 OFFICE O/P NEW MOD 45 MIN: CPT | Performed by: NURSE PRACTITIONER

## 2020-01-08 PROCEDURE — G8417 CALC BMI ABV UP PARAM F/U: HCPCS | Performed by: NURSE PRACTITIONER

## 2020-01-08 PROCEDURE — G8484 FLU IMMUNIZE NO ADMIN: HCPCS | Performed by: NURSE PRACTITIONER

## 2020-01-08 RX ORDER — RIZATRIPTAN BENZOATE 10 MG/1
10 TABLET ORAL
Qty: 9 TABLET | Refills: 5 | Status: SHIPPED | OUTPATIENT
Start: 2020-01-08 | End: 2020-07-02

## 2020-01-08 NOTE — LETTER
January 8, 2020     Abilio Cadet MD  [address] [address]    Patient: Emelia Johnston  MR Number: [id number]  YOB: 1980  Date of Visit: 1/8/2020    Dear Dr. Abilio Cadet:                 [address]            [address] prophylactic medication [address] [facility] [address]          [facility], [address] [place] [address]          Dept: [phone]          Dept Fax: [phone]        E. Schuyler Fischer, MD Isabella Proctor, MD Ahmed B. Sherrilee Shadow, MD Janey Loss, MD Santo Ganser, MD Alyssa Kilpatrick, CNP        NEW PATIENT CONSULTATION    1/8/2020      HISTORY OF PRESENT ILLNESS:           I had the pleasure of seeing Shubham Cordon for evaluation of Migraine headaches. The patient is a pleasant 43-year-old woman with no significant past medical history who is here today for evaluation of her migraine headache disorder. She has suffered from migraine headaches since the age of 12. Both her mother and her sister also had migraines, but her mother passed away in 2011. Headache location: The patient's headaches are typically bitemporal  Headache quality: The headaches are pounding and throbbing in nature  Associated factors:  nausea yes, vomiting occasionally, Photophobia yes, Phonophobia yes  Intensity: 10/10; the patient's average headaches are 78/10 but at times can be a 10/10 in intensity. Headache chronicity: The headaches have been present since the age of 12  Headache frequency: The patient is getting 3 headaches per week.   At the time that she is changing her contraceptive patch, she will typically get a headache that can last for 3 to 4 days  Aggravating factors : Lights sounds and smells can aggravate the headaches  Relieving factors: Sleeping and 800 mg ibuprofen  Prophylactic medications: None  Abortive medications: Ibuprofen  [START ON 2/8/2020] Galcanezumab-gnlm (EMGALITY) 120 MG/ML SOSY Inject 120 mg into the skin every 30 days 1 Syringe 5    rizatriptan (MAXALT) 10 MG tablet Take 1 tablet by mouth once as needed for Migraine May repeat in 2 hours if needed 9 tablet 5    butalbital-acetaminophen-caffeine (FIORICET, ESGIC) -40 MG per tablet Take 1 tablet by mouth every 4 hours as needed for Headaches or Migraine 30 tablet 0    ibuprofen (ADVIL;MOTRIN) 800 MG tablet TAKE 1 TABLET BY MOUTH EVERY 6 HOURS AS NEEDED FOR PAIN 120 tablet 2    norelgestromin-ethinyl estradiol (ORTHO EVRA) 150-35 MCG/24HR Place 1 patch onto the skin once a week 3 patch 12     No current facility-administered medications for this visit.          ALLERGIES:     Allergies   Allergen Reactions    Amoxicillin     Penicillins                                  REVIEW OF SYSTEMS    CONSTITUTIONAL Weight: absent, Appetite: absent, Fatigue: present      HEENT Ears: normal, Visual disturbance: present   RESPIRATORY Shortness of breath: absent, Cough: absent   CARDIOVASCULAR Chest pain: present, Leg swelling :present      GI Constipation: absent, Diarrhea: absent, Swallowing change: absent       Urinary frequency: present, Urinary urgency: present,   MUSCULOSKELETAL Neck pain: present, Back pain: present, Stiffness: absent, Muscle pain: absent, Joint pain: absent Restless legs: absent   DERMATOLOGIC Hair loss: absent, Skin changes: absent   NEUROLOGIC Memory loss: absent, Confusion: absent, Seizures: absent Trouble walking or imbalance: present, Dizziness: present, Weakness: present, Numbness: absent Tremor: absent, Spasm: absent, Speech difficulty: absent, Headache: present, Light sensitivity: present   PSYCHIATRIC Anxiety: absent, Hallucination: absent, Mood disorder: absent   HEMATOLOGIC Abnormal bleeding: absent, Anemia: absent,            PHYSICAL EXAMINATION:       Vitals:    01/08/20 0900   BP: 129/87   Pulse: 62 Sincerely,        Shadia Locke, APRN - CNP

## 2020-01-08 NOTE — PROGRESS NOTES
[facility]            [address] prophylactic medication [address] [facility], [address] [facility], [address] [place] [address]          Dept: [phone]          Dept Fax: [phone]        MD Min Mcknight MD Ahmed B. Celinda Close, MD García Palomino, MD Yesenia Dunbar, CNP        NEW PATIENT CONSULTATION    1/8/2020      HISTORY OF PRESENT ILLNESS:           I had the pleasure of seeing Theresa Gonzalez for evaluation of Migraine headaches. The patient is a pleasant 43-year-old woman with no significant past medical history who is here today for evaluation of her migraine headache disorder. She has suffered from migraine headaches since the age of 12. Both her mother and her sister also had migraines, but her mother passed away in 2011. Headache location: The patient's headaches are typically bitemporal  Headache quality: The headaches are pounding and throbbing in nature  Associated factors:  nausea yes, vomiting occasionally, Photophobia yes, Phonophobia yes  Intensity: 10/10; the patient's average headaches are 7-8/10 but at times can be a 10/10 in intensity. Headache chronicity: The headaches have been present since the age of 12  Headache frequency: The patient is getting 3 headaches per week. At the time that she is changing her contraceptive patch, she will typically get a headache that can last for 3 to 4 days  Aggravating factors : Lights sounds and smells can aggravate the headaches  Relieving factors: Sleeping and 800 mg ibuprofen  Prophylactic medications: None  Abortive medications: Ibuprofen  Previously used medications: Topamax which was ineffective and Toprol which was ineffective, Imitrex which was ineffective        Prior testing reviewed:    None          PAST MEDICAL HISTORY:   History reviewed. No pertinent past medical history. PAST SURGICAL HISTORY:   History reviewed.  No mouth every 4 hours as needed for Headaches or Migraine 30 tablet 0    ibuprofen (ADVIL;MOTRIN) 800 MG tablet TAKE 1 TABLET BY MOUTH EVERY 6 HOURS AS NEEDED FOR PAIN 120 tablet 2    norelgestromin-ethinyl estradiol (ORTHO EVRA) 150-35 MCG/24HR Place 1 patch onto the skin once a week 3 patch 12     No current facility-administered medications for this visit.          ALLERGIES:     Allergies   Allergen Reactions    Amoxicillin     Penicillins                                  REVIEW OF SYSTEMS    CONSTITUTIONAL Weight: absent, Appetite: absent, Fatigue: present      HEENT Ears: normal, Visual disturbance: present   RESPIRATORY Shortness of breath: absent, Cough: absent   CARDIOVASCULAR Chest pain: present, Leg swelling :present      GI Constipation: absent, Diarrhea: absent, Swallowing change: absent       Urinary frequency: present, Urinary urgency: present,   MUSCULOSKELETAL Neck pain: present, Back pain: present, Stiffness: absent, Muscle pain: absent, Joint pain: absent Restless legs: absent   DERMATOLOGIC Hair loss: absent, Skin changes: absent   NEUROLOGIC Memory loss: absent, Confusion: absent, Seizures: absent Trouble walking or imbalance: present, Dizziness: present, Weakness: present, Numbness: absent Tremor: absent, Spasm: absent, Speech difficulty: absent, Headache: present, Light sensitivity: present   PSYCHIATRIC Anxiety: absent, Hallucination: absent, Mood disorder: absent   HEMATOLOGIC Abnormal bleeding: absent, Anemia: absent,            PHYSICAL EXAMINATION:       Vitals:    01/08/20 0900   BP: 129/87   Pulse: 62                                              .                                                                                                    General Appearance:  Alert, cooperative, no signs of distress, appears stated age   Head:  Normocephalic, no signs of trauma   Eyes:  Conjunctiva/corneas clear;  eyelids intact   Ears:  Normal external ear and canals   Nose: Nares normal,

## 2020-01-21 ENCOUNTER — HOSPITAL ENCOUNTER (OUTPATIENT)
Dept: MRI IMAGING | Age: 40
Discharge: HOME | End: 2020-01-23
Payer: COMMERCIAL

## 2020-01-21 PROCEDURE — 70551 MRI BRAIN STEM W/O DYE: CPT

## 2020-02-28 ENCOUNTER — OFFICE VISIT (OUTPATIENT)
Dept: OBGYN CLINIC | Age: 40
End: 2020-02-28
Payer: COMMERCIAL

## 2020-02-28 VITALS
WEIGHT: 199.38 LBS | BODY MASS INDEX: 36.69 KG/M2 | DIASTOLIC BLOOD PRESSURE: 89 MMHG | HEART RATE: 76 BPM | HEIGHT: 62 IN | SYSTOLIC BLOOD PRESSURE: 132 MMHG

## 2020-02-28 PROCEDURE — 99396 PREV VISIT EST AGE 40-64: CPT | Performed by: OBSTETRICS & GYNECOLOGY

## 2020-02-28 PROCEDURE — G8484 FLU IMMUNIZE NO ADMIN: HCPCS | Performed by: OBSTETRICS & GYNECOLOGY

## 2020-02-28 ASSESSMENT — ENCOUNTER SYMPTOMS
BACK PAIN: 0
ABDOMINAL PAIN: 0
COUGH: 0
SHORTNESS OF BREATH: 0

## 2020-02-28 NOTE — PROGRESS NOTES
[facility]YSICIANS  [facility] - [place] [address]  [address] [facility]  Dept: [phone]    Chief complaint:   Chief Complaint   Patient presents with    Gynecologic Exam     Last pap 3/30/18 WNL -HPV        History Present Illness: Nini Aj is a 34 yo female who presents for her annual exam.  She is having some \"pressure in the pelvis. \"  She has a h/o endometriosis and would like to do something about it. She is sexually active and has occasional dyspareunia. She denies any vaginal discharge. She denies any bowel or bladder issues. Current Medications (OTC/Herbal):   Current Outpatient Medications   Medication Sig Dispense Refill    norelgestromin-ethinyl estradiol (ORTHO EVRA) 150-35 MCG/24HR Place 1 patch onto the skin once a week 3 patch 0    Galcanezumab-gnlm (EMGALITY) 120 MG/ML SOSY Inject 240 mg into the skin every 30 days 2 Syringe 0    Galcanezumab-gnlm (EMGALITY) 120 MG/ML SOSY Inject 120 mg into the skin every 30 days 1 Syringe 5    rizatriptan (MAXALT) 10 MG tablet Take 1 tablet by mouth once as needed for Migraine May repeat in 2 hours if needed 9 tablet 5    butalbital-acetaminophen-caffeine (FIORICET, ESGIC) -40 MG per tablet Take 1 tablet by mouth every 4 hours as needed for Headaches or Migraine 30 tablet 0    ibuprofen (ADVIL;MOTRIN) 800 MG tablet TAKE 1 TABLET BY MOUTH EVERY 6 HOURS AS NEEDED FOR PAIN 120 tablet 2     No current facility-administered medications for this visit. Allergies: Allergies   Allergen Reactions    Amoxicillin     Penicillins      Past Medical History: No past medical history on file. Past Surgical History: No past surgical history on file.   Obstetric History:   1  Para 0  Gynecologic History: LMP 19   Menarche 11  Duration 3-5 d    Interval q  28 d  Tampons/Pads in a day: 3-4  Last Pap: 3/30/18       Any history of abnormal paps no    PriorColpo/Biopsy n/a     Last Mammogram n/a  Contraception: HENT: Negative for congestion. Respiratory: Negative for cough and shortness of breath. Cardiovascular: Negative for chest pain and palpitations. Gastrointestinal: Negative for abdominal pain. Genitourinary: Negative for dyspareunia and vaginal discharge. Musculoskeletal: Negative for back pain. Neurological: Negative for dizziness and light-headedness. Psychiatric/Behavioral: The patient is not nervous/anxious. Physical exam:  vitals:  Height   5  ft    2 in,  Weight    199 lbs,   132/89 BP  Gen: alert, no apparent distress  HEENT:No pathologic skin lesions noted,NC/AT,PERRL, normal midline nontender thyroid   Lung Exam: Clear to auscultation in all fields bilaterally, without wheezes,rales or rhonchi. Cardiac Exam: Normal sinus rhythm andrate, without murmurs, rubs or gallops appreciated. Breast Exam: Symmetric without pathological skin changes, nontender without discrete suspicious masses palpated, supraclavicular or axillary adenopathy or nipple discharge noted. Abdominal Exam: Nontender to deep palpation without organomegaly, masses or CVAT appreciated, BS positive. No spinal deformation or tenderness. External Genitalia: Normal development without vulvar,vaginal or cervical lesions noted. Normal vaginal discharge, uterus anterior, 4-6 weeks without CMT. Adnexa nontender without abnormal masses bilaterally. Rectal Exam: Omitted. Extremities: Nontender without clubbing, cyanosis or edema. F.R.O.M. Neurologic Exam: Grossly intact without noted sensorimotor deficits and oriented x 3. Assessment/Plan:   Unremarkable annual Gyn exam.    Cervical Cytology Evaluation begins at 24years old. If Negative Cytology, Follow-up screening per current guidelines. Mammograms every 1year. If 37 yo and last mammogram was negative. Endometriosis - will try continuous patches. Colonoscopy screening reviewed as well as onset for bone density testing.   Birth control and barrier

## 2020-05-13 RX ORDER — GALCANEZUMAB 120 MG/ML
120 INJECTION, SOLUTION SUBCUTANEOUS
Qty: 1 SYRINGE | Refills: 5 | Status: SHIPPED | OUTPATIENT
Start: 2020-05-13 | End: 2021-01-04

## 2020-07-02 ENCOUNTER — OFFICE VISIT (OUTPATIENT)
Dept: NEUROLOGY | Age: 40
End: 2020-07-02
Payer: COMMERCIAL

## 2020-07-02 VITALS
BODY MASS INDEX: 35.61 KG/M2 | SYSTOLIC BLOOD PRESSURE: 122 MMHG | HEIGHT: 63 IN | HEART RATE: 62 BPM | WEIGHT: 201 LBS | DIASTOLIC BLOOD PRESSURE: 82 MMHG

## 2020-07-02 PROCEDURE — 99214 OFFICE O/P EST MOD 30 MIN: CPT | Performed by: NURSE PRACTITIONER

## 2020-07-02 RX ORDER — GALCANEZUMAB 120 MG/ML
120 INJECTION, SOLUTION SUBCUTANEOUS
Qty: 1 PEN | Refills: 5 | Status: SHIPPED | OUTPATIENT
Start: 2020-07-02 | End: 2021-05-18 | Stop reason: ALTCHOICE

## 2020-07-02 NOTE — PROGRESS NOTES
in relieving her headaches. She tolerates the Emgality without side effects. She stated that she did not receive an autoinjector, but a syringe to administer her medications. Prior testing reviewed:    No imaging          PAST MEDICAL HISTORY:   History reviewed. No pertinent past medical history. PAST SURGICAL HISTORY:   History reviewed. No pertinent surgical history.      SOCIAL HISTORY:     Social History     Socioeconomic History    Marital status: Single     Spouse name: Not on file    Number of children: Not on file    Years of education: Not on file    Highest education level: Not on file   Occupational History    Not on file   Social Needs    Financial resource strain: Not on file    Food insecurity     Worry: Not on file     Inability: Not on file    Transportation needs     Medical: Not on file     Non-medical: Not on file   Tobacco Use    Smoking status: Never Smoker    Smokeless tobacco: Never Used   Substance and Sexual Activity    Alcohol use: No    Drug use: No    Sexual activity: Yes     Partners: Male   Lifestyle    Physical activity     Days per week: Not on file     Minutes per session: Not on file    Stress: Not on file   Relationships    Social connections     Talks on phone: Not on file     Gets together: Not on file     Attends [employer] service: Not on file     Active member of club or organization: Not on file     Attends meetings of clubs or organizations: Not on file     Relationship status: Not on file    Intimate partner violence     Fear of current or ex partner: Not on file     Emotionally abused: Not on file     Physically abused: Not on file     Forced sexual activity: Not on file   Other Topics Concern    Not on file   Social History Narrative    Not on file       CURRENT MEDICATIONS:     Current Outpatient Medications   Medication Sig Dispense Refill    Galcanezumab-gnlm (EMGALITY) 120 MG/ML SOAJ Inject 120 mg into the skin every 30 days 1 pen 5    Galcanezumab-gnlm (EMGALITY) 120 MG/ML SOSY Inject 120 mg into the skin every 30 days 1 Syringe 5    norelgestromin-ethinyl estradiol (ORTHO EVRA) 150-35 MCG/24HR Place 1 patch onto the skin once a week Place continuously, and after 3 months of continuous patches take a break for a period. 4 patch 16    butalbital-acetaminophen-caffeine (FIORICET, ESGIC) -40 MG per tablet Take 1 tablet by mouth every 4 hours as needed for Headaches or Migraine 30 tablet 0    ibuprofen (ADVIL;MOTRIN) 800 MG tablet TAKE 1 TABLET BY MOUTH EVERY 6 HOURS AS NEEDED FOR PAIN 120 tablet 2     No current facility-administered medications for this visit. ALLERGIES:     Allergies   Allergen Reactions    Amoxicillin     Penicillins                                  REVIEW OF SYSTEMS       All items selected indicate a positive finding. Those items not selected are negative. Constitutional [] Weight loss/gain   [] Fatigue  [] Fever/Chills   HEENT [] Hearing Loss  [] Visual Disturbance  [] Tinnitus  [] Eye pain   Respiratory [] Shortness of Breath  [] Cough  [] Snoring   Cardiovascular [] Chest Pain  [] Palpitations  [] Lightheaded   GI [] Constipation  [] Diarrhea  [] Swallowing change    [] Urinary Frequency  [] Urinary Urgency   Musculoskeletal [] Neck pain  [] Back pain  [] Muscle pain  [] Restless legs   Dermatologic [] Skin changes   Neurologic [] Memory loss/confusion  [] Seizures  [] Trouble walking or imbalance  [] Dizziness  [] Weakness  [] Numbness  [] Tremors  [] Speech Difficulty  [x] Headaches  [x] Light Sensitivity  [x] Sound Sensitivity   Endocrinology []Excessive thirst  []Excessive hunger   Psychiatric [] Anxiety/Depression  [] Hallucination   Allergy/immunology []Hives/environmental allergies   Hematologic/lymph [] Abnormal bleeding  [] Abnormal bruising     PHYSICAL EXAMINATION:       Vitals:    07/02/20 0855   BP: 122/82   Pulse: 62                                              . General Appearance:  Alert, cooperative, no signs of distress, appears stated age   Head:  Normocephalic, no signs of trauma   Eyes:  Conjunctiva/corneas clear;  eyelids intact   Ears:  Normal external ear and canals   Nose: Nares normal, mucosa normal, no drainage    Throat: Lips and tongue normal; teeth normal;  gums normal   Neck: Supple, intact flexion, extension and rotation;   trachea midline;  no adenopathy;   thyroid: not enlarged;   no carotid pulse abnormality   Back:   Symmetric, no curvature, ROM adequate   Lungs:   Respirations unlabored   Heart:  Regular rate and rhythm           Extremities: Extremities normal, no cyanosis, no edema   Pulses: Symmetric over head and neck   Skin: Skin color, texture normal, no rashes, no lesions                                     NEUROLOGIC EXAMINATION    Neurologic Exam  Mental status    Alert and oriented x 3; intact memory with no confusion, speech or language problems; no hallucinations or delusions  Fund of information appropriate for level of education    Cranial nerves    II - visual fields intact to confrontation bilaterally  III, IV, VI - extra-ocular muscles full: no pupillary defect; no MANASA, no nystagmus, no ptosis   V - normal facial sensation                                                               VII - normal facial symmetry                                                             VIII - intact hearing                                                                             IX, X - symmetrical palate                                                                  XI - symmetrical shoulder shrug                                                       XII - tongue midline without atrophy or fasciculation      Motor function  Normal muscle bulk and tone; strength 5/5 on all 4 extremities, no pronator drift      Sensory function Intact to light touch, pinprick, vibration,

## 2020-07-06 ENCOUNTER — TELEPHONE (OUTPATIENT)
Dept: NEUROLOGY | Age: 40
End: 2020-07-06

## 2020-07-08 NOTE — TELEPHONE ENCOUNTER
We received approval for the Emgality. I called [facility] pharmacy and told them. They are processing the prescription. I also called Barry Walker and let her know that it was approved. She voiced understanding.

## 2021-01-04 ENCOUNTER — OFFICE VISIT (OUTPATIENT)
Dept: NEUROLOGY | Age: 41
End: 2021-01-04
Payer: COMMERCIAL

## 2021-01-04 VITALS
WEIGHT: 201.6 LBS | HEIGHT: 62 IN | SYSTOLIC BLOOD PRESSURE: 157 MMHG | HEART RATE: 80 BPM | BODY MASS INDEX: 37.1 KG/M2 | DIASTOLIC BLOOD PRESSURE: 100 MMHG | TEMPERATURE: 97 F

## 2021-01-04 DIAGNOSIS — G43.011 INTRACTABLE MIGRAINE WITHOUT AURA AND WITH STATUS MIGRAINOSUS: Primary | ICD-10-CM

## 2021-01-04 PROCEDURE — 99214 OFFICE O/P EST MOD 30 MIN: CPT | Performed by: NURSE PRACTITIONER

## 2021-01-04 RX ORDER — IBUPROFEN 800 MG/1
800 TABLET ORAL EVERY 6 HOURS PRN
Qty: 60 TABLET | Refills: 2 | Status: SHIPPED | OUTPATIENT
Start: 2021-01-04 | End: 2021-02-09

## 2021-01-04 RX ORDER — PREDNISONE 20 MG/1
TABLET ORAL
Qty: 18 TABLET | Refills: 0 | Status: SHIPPED | OUTPATIENT
Start: 2021-01-04 | End: 2021-01-21

## 2021-01-04 NOTE — PROGRESS NOTES
[facility]            [address]          [facility], [address] [facility]          Dept: [phone]          Dept Fax: [phone]        E. Misty Lobe, MD Livia Hickory, MD Ahmed B. Lovenia Plough, MD Tomma Maple, MD Wendel Ahumada, MD Jie Peguero, CNP            1/4/2021      HISTORY OF PRESENT ILLNESS:       I had the pleasure of seeing Karla Uriostegui, who returns for continuing neurologic care. The patient was seen last on July 2, 2020 for treatment of migraine headaches without aura. Today, the patient states that the Emgality is no longer working like it used to. The patient comes to her appointment with a migraine that has lasted for the past 5 days. The patient is due for her Emgality injection this week. She has to lay down when she has a migraine. She was unable to go to work today due to her migraine. Before the migraine she currently has, the patient has been getting 1 headache a week. The patient admits to a pulsating feel behind her eye. She has a family history of aneurysms and is worried this could be the cause. Headache location: bitemporal  Headache quality: pounding and throbbing  Associated factors:  nausea, vomiting, Photophobia, Phonophobia  Intensity: 7-8/10  Headache chronicity: ~25 years  Headache frequency: 3-4 headaches when she changes her contraceptive patch  Aggravating factors : bright lights, loud sounds, contraceptive patch change  Relieving factors: rest  Prophylactic medications: Emgality  Abortive medications: Fioricet  Previously used medications: Imitrex, Topamax, Toprol, Maxalt        Testing reviewed:    MRI Brain WO contrast 1/21/2020  Impression   Unremarkable MRI of the brain.                  PAST MEDICAL HISTORY:   No past medical history on file. PAST SURGICAL HISTORY:   No past surgical history on file.      SOCIAL HISTORY:     Social History Socioeconomic History    Marital status: Single     Spouse name: Not on file    Number of children: Not on file    Years of education: Not on file    Highest education level: Not on file   Occupational History    Not on file   Social Needs    Financial resource strain: Not on file    Food insecurity     Worry: Not on file     Inability: Not on file    Transportation needs     Medical: Not on file     Non-medical: Not on file   Tobacco Use    Smoking status: Never Smoker    Smokeless tobacco: Never Used   Substance and Sexual Activity    Alcohol use: No    Drug use: No    Sexual activity: Yes     Partners: Male   Lifestyle    Physical activity     Days per week: Not on file     Minutes per session: Not on file    Stress: Not on file   Relationships    Social connections     Talks on phone: Not on file     Gets together: Not on file     Attends [employer] service: Not on file     Active member of club or organization: Not on file     Attends meetings of clubs or organizations: Not on file     Relationship status: Not on file    Intimate partner violence     Fear of current or ex partner: Not on file     Emotionally abused: Not on file     Physically abused: Not on file     Forced sexual activity: Not on file   Other Topics Concern    Not on file   Social History Narrative    Not on file       CURRENT MEDICATIONS:     Current Outpatient Medications   Medication Sig Dispense Refill    predniSONE (DELTASONE) 20 MG tablet 3 tabs QD x 3 days then 2 tabs x 3 days then 1 tab QD x 3 days 18 tablet 0    Ubrogepant 100 MG TABS Take one at onset of migraine. May repeat in 2 hours.   No more than 2 in 24 hours and 4 in 1 week 10 tablet 5    Galcanezumab-gnlm (EMGALITY) 120 MG/ML SOAJ Inject 120 mg into the skin every 30 days 1 pen 5    norelgestromin-ethinyl estradiol (ORTHO EVRA) 150-35 MCG/24HR Place 1 patch onto the skin once a week Place continuously, and after 3 months of continuous patches take a break for a period. 4 patch 16    ibuprofen (ADVIL;MOTRIN) 800 MG tablet TAKE 1 TABLET BY MOUTH EVERY 6 HOURS AS NEEDED FOR PAIN (Patient not taking: Reported on 1/4/2021) 120 tablet 2     No current facility-administered medications for this visit. ALLERGIES:     Allergies   Allergen Reactions    Amoxicillin     Penicillins                                  REVIEW OF SYSTEMS        All items selected indicate a positive finding. Those items not selected are negative. Constitutional [] Weight loss/gain   [] Fatigue  [] Fever/Chills   HEENT [] Hearing Loss  [] Visual Disturbance  [] Tinnitus  [] Eye pain   Respiratory [] Shortness of Breath  [] Cough  [] Snoring   Cardiovascular [] Chest Pain  [] Palpitations  [] Lightheaded   GI [] Constipation  [] Diarrhea  [] Swallowing change  [x] Nausea/vomiting    [] Urinary Frequency  [] Urinary Urgency   Musculoskeletal [] Neck pain  [] Back pain  [] Muscle pain  [] Restless legs   Dermatologic [] Skin changes   Neurologic [] Memory loss/confusion  [] Seizures  [] Trouble walking or imbalance  [] Dizziness  [] Sleep disturbance  [] Weakness  [] Numbness  [] Tremors  [] Speech Difficulty  [x] Headaches  [x] Light Sensitivity  [x] Sound Sensitivity   Endocrinology []Excessive thirst  []Excessive hunger   Psychiatric [] Anxiety/Depression  [] Hallucination   Allergy/immunology []Hives/environmental allergies   Hematologic/lymph [] Abnormal bleeding  [] Abnormal bruising         PHYSICAL EXAMINATION:       Vitals:    01/04/21 0830   BP: (!) 150/99   Pulse: 75   Temp: 97 °F (36.1 °C)                                              .                                                                                                     General Appearance:  Alert, cooperative, no signs of distress, appears stated age   Head:  Normocephalic, no signs of trauma   Eyes:  Conjunctiva/corneas clear;  eyelids intact   Ears:  Normal external ear and canals   Nose: Nares normal, mucosa normal, no drainage    Throat: Lips and tongue normal; teeth normal;  gums normal   Neck: Supple, intact flexion, extension and rotation;   trachea midline;  no adenopathy;   thyroid: not enlarged;   no carotid pulse abnormality   Back:   Symmetric, no curvature, ROM adequate   Lungs:   Respirations unlabored   Heart:  Regular rate and rhythm           Extremities: Extremities normal, no cyanosis, no edema   Pulses: Symmetric over head and neck   Skin: Skin color, texture normal, no rashes, no lesions                                     NEUROLOGIC EXAMINATION    Neurologic Exam  Mental status    Alert and oriented x 3; intact memory with no confusion, speech or language problems; no hallucinations or delusions  Fund of information appropriate for level of education    Cranial nerves    II - visual fields intact to confrontation bilaterally  III, IV, VI  extra-ocular muscles full: no pupillary defect; no MANASA, no nystagmus, no ptosis   V - normal facial sensation                                                               VII - normal facial symmetry                                                             VIII - intact hearing                                                                             IX, X - symmetrical palate                                                                  XI - symmetrical shoulder shrug                                                       XII - tongue midline without atrophy or fasciculation      Motor function  Normal muscle bulk and tone; strength 5/5 on all 4 extremities, no pronator drift      Sensory function Intact to light touch, pinprick, vibration, proprioception on all 4 extremities      Cerebellar Intact fine motor movement. No involuntary movements or tremors. No ataxia or dysmetria on finger to nose or heel to shin testing      Reflex function DTR 2+ on bilateral UE and LE, symmetric.  Negative Babinski      Gait                   normal base and arm swing                  Medical Decision Making: In summary, your patient, Salbador Lion exhibits the following, with associated plan:    1. Migraine headaches without aura. Previously getting about 1 headaches per week and after the initiation of Emgality. The patient has a current migraine that has been ongoing for 5 days. 1. Start Prednisone 60 mg for 3 days, then 40 mg for 3 days, then 20 mg for 3 days to abort current migraine  2. Start Ubrelvy 100 mg for abortive therapy  3. Continue Emgality 120 mg every 30 days  4. Continue ibuprofen as needed  5. Return for reevaluation in 3 months  2. Pulsating behind the eyes, family history of aneurysms, with new intractable headache, with status migrainosus  1. Order CTA of the head and neck            Signed: Luis M Watson CNP      *Please note that portions of this note were completed with a voice recognition program.  Although every effort was made to insure the accuracy of this automated transcription, some errors in transcription may have occurred, occasionally words and are mis-transcribed    Provider Attestation: The documentation recorded by the scribe accurately reflects the service I personally performed and the decisions made by myself. Portions of this note were transcribed by a scribe. I personally performed the history, physical exam, and medical decision-making and confirm the accuracy of the information in the transcribed note. Scribe Attestation:   By signing my name below, Francisca Ortiz, attest that this documentation has been prepared under the direction and in the presence of Luis M Watson CNP.

## 2021-01-07 ENCOUNTER — TELEPHONE (OUTPATIENT)
Dept: NEUROLOGY | Age: 41
End: 2021-01-07

## 2021-01-07 NOTE — TELEPHONE ENCOUNTER
Pt called in stating she had a misunderstanding with her Pred taper and yesterday she took 3 tabs TID instead of 3 tabs once a day. She had called the pharmacy and they told her to call us and go to the ER. She said that she feels ok, just a little groggy. She said that she will wait until she hears from us before taking any more of the medication. I told her not to take anymore of the medication today. Please advise, thanks. She also wanted to know if you would take over the FMLA because Dr Susu Hunter is retiring. Please advise, thanks.

## 2021-01-07 NOTE — TELEPHONE ENCOUNTER
Patient was contacted regarding her steroid taper. She was advised that this dosage is not going to be detrimental to her health. We give MS patients much larger doses as much as 1000 mg a day. For the rest of her taper, she will begin with 3 tablets tomorrow, then the next 2 days take 2 tablets, then the next 2 days take 1 tablet and stop. The patient also asked about her FMLA. I did inform her that we would be happy to help to fill out her FMLA since her primary care doctor is no longer practicing and we are in management of her headaches.

## 2021-01-12 ENCOUNTER — TELEPHONE (OUTPATIENT)
Dept: NEUROLOGY | Age: 41
End: 2021-01-12

## 2021-01-12 NOTE — TELEPHONE ENCOUNTER
FMLA form received from pt. Per other telephone encounter Sarah Stevens CNP is agreeable to complete this paperwork.

## 2021-01-15 ENCOUNTER — HOSPITAL ENCOUNTER (OUTPATIENT)
Dept: CT IMAGING | Age: 41
Discharge: HOME | End: 2021-01-17
Payer: COMMERCIAL

## 2021-01-15 ENCOUNTER — TELEPHONE (OUTPATIENT)
Dept: NEUROLOGY | Age: 41
End: 2021-01-15

## 2021-01-15 DIAGNOSIS — G43.011 INTRACTABLE MIGRAINE WITHOUT AURA AND WITH STATUS MIGRAINOSUS: ICD-10-CM

## 2021-01-15 DIAGNOSIS — I67.1 ANEURYSM OF INTERNAL CAROTID ARTERY: Primary | ICD-10-CM

## 2021-01-15 PROCEDURE — 2580000003 HC RX 258: Performed by: NURSE PRACTITIONER

## 2021-01-15 PROCEDURE — 6360000004 HC RX CONTRAST MEDICATION: Performed by: NURSE PRACTITIONER

## 2021-01-15 PROCEDURE — 70496 CT ANGIOGRAPHY HEAD: CPT

## 2021-01-15 RX ORDER — SODIUM CHLORIDE 0.9 % (FLUSH) 0.9 %
10 SYRINGE (ML) INJECTION 2 TIMES DAILY
Status: DISCONTINUED | OUTPATIENT
Start: 2021-01-15 | End: 2021-01-18 | Stop reason: HOSPADM

## 2021-01-15 RX ORDER — 0.9 % SODIUM CHLORIDE 0.9 %
80 INTRAVENOUS SOLUTION INTRAVENOUS ONCE
Status: COMPLETED | OUTPATIENT
Start: 2021-01-15 | End: 2021-01-15

## 2021-01-15 RX ADMIN — IOPAMIDOL 75 ML: 755 INJECTION, SOLUTION INTRAVENOUS at 11:05

## 2021-01-15 RX ADMIN — SODIUM CHLORIDE 80 ML: 9 INJECTION, SOLUTION INTRAVENOUS at 11:06

## 2021-01-15 RX ADMIN — Medication 10 ML: at 11:05

## 2021-01-15 NOTE — TELEPHONE ENCOUNTER
[facility] radiology called the office today regarding an abnormal CTA head and neck result that was done today on this patient. This is a patient of Herbert who is out of the office until Monday. Could you please advise.

## 2021-01-20 ENCOUNTER — OFFICE VISIT (OUTPATIENT)
Dept: NEUROLOGY | Age: 41
End: 2021-01-20
Payer: COMMERCIAL

## 2021-01-20 VITALS
HEART RATE: 79 BPM | WEIGHT: 190 LBS | BODY MASS INDEX: 34.75 KG/M2 | DIASTOLIC BLOOD PRESSURE: 82 MMHG | SYSTOLIC BLOOD PRESSURE: 122 MMHG

## 2021-01-20 DIAGNOSIS — I67.1 ANEURYSM OF INTERNAL CAROTID ARTERY: Primary | ICD-10-CM

## 2021-01-20 PROCEDURE — 99204 OFFICE O/P NEW MOD 45 MIN: CPT | Performed by: PSYCHIATRY & NEUROLOGY

## 2021-01-20 RX ORDER — CLOPIDOGREL BISULFATE 75 MG/1
75 TABLET ORAL DAILY
Qty: 90 TABLET | Refills: 1 | Status: ON HOLD | OUTPATIENT
Start: 2021-01-20 | End: 2021-01-28 | Stop reason: HOSPADM

## 2021-01-20 RX ORDER — ASPIRIN 325 MG
325 TABLET, DELAYED RELEASE (ENTERIC COATED) ORAL DAILY
Qty: 90 TABLET | Refills: 1 | Status: ON HOLD | OUTPATIENT
Start: 2021-01-20 | End: 2021-01-28 | Stop reason: HOSPADM

## 2021-01-20 ASSESSMENT — ENCOUNTER SYMPTOMS
PHOTOPHOBIA: 0
COLOR CHANGE: 0
COUGH: 0
VOMITING: 0
VOICE CHANGE: 0
SHORTNESS OF BREATH: 0
NAUSEA: 0

## 2021-01-21 ENCOUNTER — HOSPITAL ENCOUNTER (OUTPATIENT)
Dept: LAB | Age: 41
Setting detail: SPECIMEN
Discharge: HOME | End: 2021-01-21
Payer: COMMERCIAL

## 2021-01-21 PROCEDURE — U0003 INFECTIOUS AGENT DETECTION BY NUCLEIC ACID (DNA OR RNA); SEVERE ACUTE RESPIRATORY SYNDROME CORONAVIRUS 2 (SARS-COV-2) (CORONAVIRUS DISEASE [COVID-19]), AMPLIFIED PROBE TECHNIQUE, MAKING USE OF HIGH THROUGHPUT TECHNOLOGIES AS DESCRIBED BY CMS-2020-01-R: HCPCS

## 2021-01-21 PROCEDURE — U0005 INFEC AGEN DETEC AMPLI PROBE: HCPCS

## 2021-01-22 LAB
SARS-COV-2, RAPID: NORMAL
SARS-COV-2: NORMAL
SARS-COV-2: NOT DETECTED
SOURCE: NORMAL

## 2021-01-23 ENCOUNTER — TELEPHONE (OUTPATIENT)
Dept: PRIMARY CARE CLINIC | Age: 41
End: 2021-01-23

## 2021-01-24 ENCOUNTER — ANESTHESIA EVENT (OUTPATIENT)
Dept: INTERVENTIONAL RADIOLOGY/VASCULAR | Age: 41
End: 2021-01-24

## 2021-01-25 ENCOUNTER — ANESTHESIA (OUTPATIENT)
Dept: INTERVENTIONAL RADIOLOGY/VASCULAR | Age: 41
End: 2021-01-25

## 2021-01-25 ENCOUNTER — HOSPITAL ENCOUNTER (INPATIENT)
Dept: INTERVENTIONAL RADIOLOGY/VASCULAR | Age: 41
LOS: 3 days | Discharge: HOME | DRG: 025 | End: 2021-01-28
Attending: PSYCHIATRY & NEUROLOGY | Admitting: PSYCHIATRY & NEUROLOGY
Payer: COMMERCIAL

## 2021-01-25 VITALS — DIASTOLIC BLOOD PRESSURE: 55 MMHG | TEMPERATURE: 95.7 F | SYSTOLIC BLOOD PRESSURE: 106 MMHG | OXYGEN SATURATION: 100 %

## 2021-01-25 DIAGNOSIS — I67.1 CEREBRAL ANEURYSM: ICD-10-CM

## 2021-01-25 DIAGNOSIS — I63.232 CEREBRAL INFARCTION DUE TO OCCLUSION OF LEFT INTERNAL CAROTID ARTERY (HCC): Primary | ICD-10-CM

## 2021-01-25 DIAGNOSIS — I72.9 ANEURYSM (HCC): ICD-10-CM

## 2021-01-25 PROBLEM — I60.7 CEREBRAL ANEURYSM RUPTURE (HCC): Status: ACTIVE | Noted: 2021-01-25

## 2021-01-25 LAB
ABO/RH: NORMAL
ABSOLUTE EOS #: <0.03 K/UL (ref 0–0.44)
ABSOLUTE IMMATURE GRANULOCYTE: 0.03 K/UL (ref 0–0.3)
ABSOLUTE LYMPH #: 1.17 K/UL (ref 1.1–3.7)
ABSOLUTE MONO #: 0.14 K/UL (ref 0.1–1.2)
ACTIVATED CLOTTING TIME: 119 SEC (ref 79–149)
ACTIVATED CLOTTING TIME: 217 SEC (ref 79–149)
ANION GAP SERPL CALCULATED.3IONS-SCNC: 11 MMOL/L (ref 9–17)
ANTIBODY SCREEN: NEGATIVE
ARM BAND NUMBER: NORMAL
BASOPHILS # BLD: 0 % (ref 0–2)
BASOPHILS ABSOLUTE: <0.03 K/UL (ref 0–0.2)
BUN BLDV-MCNC: 7 MG/DL (ref 6–20)
BUN/CREAT BLD: ABNORMAL (ref 9–20)
CALCIUM SERPL-MCNC: 8.8 MG/DL (ref 8.6–10.4)
CHLORIDE BLD-SCNC: 107 MMOL/L (ref 98–107)
CO2: 19 MMOL/L (ref 20–31)
COLLAGEN ADENOSINE-5'-DIPHOSPHATE (ADP) TIME: 73 SEC (ref 67–112)
COLLAGEN EPINEPHRINE TIME: 105 SEC (ref 85–172)
CREAT SERPL-MCNC: 0.68 MG/DL (ref 0.5–0.9)
DIFFERENTIAL TYPE: ABNORMAL
EOSINOPHILS RELATIVE PERCENT: 0 % (ref 1–4)
EXPIRATION DATE: NORMAL
GFR AFRICAN AMERICAN: >60 ML/MIN
GFR NON-AFRICAN AMERICAN: >60 ML/MIN
GFR SERPL CREATININE-BSD FRML MDRD: ABNORMAL ML/MIN/{1.73_M2}
GFR SERPL CREATININE-BSD FRML MDRD: ABNORMAL ML/MIN/{1.73_M2}
GLUCOSE BLD-MCNC: 112 MG/DL (ref 70–99)
GLUCOSE BLD-MCNC: 91 MG/DL (ref 65–105)
GLUCOSE BLD-MCNC: 91 MG/DL (ref 74–100)
HCG, PREGNANCY URINE (POC): NEGATIVE
HCT VFR BLD CALC: 35.2 % (ref 36.3–47.1)
HEMOGLOBIN: 11.9 G/DL (ref 11.9–15.1)
IMMATURE GRANULOCYTES: 0 %
LYMPHOCYTES # BLD: 13 % (ref 24–43)
MCH RBC QN AUTO: 29.2 PG (ref 25.2–33.5)
MCHC RBC AUTO-ENTMCNC: 33.8 G/DL (ref 28.4–34.8)
MCV RBC AUTO: 86.3 FL (ref 82.6–102.9)
MONOCYTES # BLD: 2 % (ref 3–12)
NRBC AUTOMATED: 0 PER 100 WBC
PDW BLD-RTO: 12.7 % (ref 11.8–14.4)
PLATELET # BLD: 296 K/UL (ref 138–453)
PLATELET ESTIMATE: ABNORMAL
PLATELET FUNCTION INTERP: NORMAL
PMV BLD AUTO: 10.2 FL (ref 8.1–13.5)
POC CHLORIDE: 106 MMOL/L (ref 98–107)
POC IONIZED CALCIUM: 1.25 MMOL/L (ref 1.15–1.33)
POC POTASSIUM: 4 MMOL/L (ref 3.5–4.5)
POC SODIUM: 138 MMOL/L (ref 138–146)
POTASSIUM SERPL-SCNC: 3.8 MMOL/L (ref 3.7–5.3)
RBC # BLD: 4.08 M/UL (ref 3.95–5.11)
RBC # BLD: ABNORMAL 10*6/UL
SEG NEUTROPHILS: 85 % (ref 36–65)
SEGMENTED NEUTROPHILS ABSOLUTE COUNT: 7.69 K/UL (ref 1.5–8.1)
SODIUM BLD-SCNC: 137 MMOL/L (ref 135–144)
WBC # BLD: 9.1 K/UL (ref 3.5–11.3)
WBC # BLD: ABNORMAL 10*3/UL

## 2021-01-25 PROCEDURE — 2000000003 HC NEURO ICU R&B

## 2021-01-25 PROCEDURE — 6360000002 HC RX W HCPCS: Performed by: STUDENT IN AN ORGANIZED HEALTH CARE EDUCATION/TRAINING PROGRAM

## 2021-01-25 PROCEDURE — 82330 ASSAY OF CALCIUM: CPT

## 2021-01-25 PROCEDURE — 2580000003 HC RX 258

## 2021-01-25 PROCEDURE — 87086 URINE CULTURE/COLONY COUNT: CPT

## 2021-01-25 PROCEDURE — 6360000002 HC RX W HCPCS

## 2021-01-25 PROCEDURE — 85576 BLOOD PLATELET AGGREGATION: CPT

## 2021-01-25 PROCEDURE — 36224 PLACE CATH CAROTD ART: CPT | Performed by: PSYCHIATRY & NEUROLOGY

## 2021-01-25 PROCEDURE — 2580000003 HC RX 258: Performed by: STUDENT IN AN ORGANIZED HEALTH CARE EDUCATION/TRAINING PROGRAM

## 2021-01-25 PROCEDURE — 6370000000 HC RX 637 (ALT 250 FOR IP): Performed by: STUDENT IN AN ORGANIZED HEALTH CARE EDUCATION/TRAINING PROGRAM

## 2021-01-25 PROCEDURE — 75894 X-RAYS TRANSCATH THERAPY: CPT | Performed by: PSYCHIATRY & NEUROLOGY

## 2021-01-25 PROCEDURE — C1894 INTRO/SHEATH, NON-LASER: HCPCS

## 2021-01-25 PROCEDURE — 99254 IP/OBS CNSLTJ NEW/EST MOD 60: CPT | Performed by: PSYCHIATRY & NEUROLOGY

## 2021-01-25 PROCEDURE — 75898 FOLLOW-UP ANGIOGRAPHY: CPT | Performed by: PSYCHIATRY & NEUROLOGY

## 2021-01-25 PROCEDURE — 81025 URINE PREGNANCY TEST: CPT

## 2021-01-25 PROCEDURE — 82947 ASSAY GLUCOSE BLOOD QUANT: CPT

## 2021-01-25 PROCEDURE — 80048 BASIC METABOLIC PNL TOTAL CA: CPT

## 2021-01-25 PROCEDURE — 7100000001 HC PACU RECOVERY - ADDTL 15 MIN

## 2021-01-25 PROCEDURE — 84295 ASSAY OF SERUM SODIUM: CPT

## 2021-01-25 PROCEDURE — 61624 TCAT PERM OCCLS/EMBOLJ CNS: CPT | Performed by: PSYCHIATRY & NEUROLOGY

## 2021-01-25 PROCEDURE — 86900 BLOOD TYPING SEROLOGIC ABO: CPT

## 2021-01-25 PROCEDURE — 2500000003 HC RX 250 WO HCPCS: Performed by: NURSE ANESTHETIST, CERTIFIED REGISTERED

## 2021-01-25 PROCEDURE — 2709999900 HC NON-CHARGEABLE SUPPLY

## 2021-01-25 PROCEDURE — 7100000000 HC PACU RECOVERY - FIRST 15 MIN

## 2021-01-25 PROCEDURE — C1887 CATHETER, GUIDING: HCPCS

## 2021-01-25 PROCEDURE — 2500000003 HC RX 250 WO HCPCS

## 2021-01-25 PROCEDURE — 2500000003 HC RX 250 WO HCPCS: Performed by: STUDENT IN AN ORGANIZED HEALTH CARE EDUCATION/TRAINING PROGRAM

## 2021-01-25 PROCEDURE — 85347 COAGULATION TIME ACTIVATED: CPT

## 2021-01-25 PROCEDURE — 86901 BLOOD TYPING SEROLOGIC RH(D): CPT

## 2021-01-25 PROCEDURE — 85025 COMPLETE CBC W/AUTO DIFF WBC: CPT

## 2021-01-25 PROCEDURE — 76377 3D RENDER W/INTRP POSTPROCES: CPT | Performed by: PSYCHIATRY & NEUROLOGY

## 2021-01-25 PROCEDURE — 6360000002 HC RX W HCPCS: Performed by: NURSE ANESTHETIST, CERTIFIED REGISTERED

## 2021-01-25 PROCEDURE — C1769 GUIDE WIRE: HCPCS

## 2021-01-25 PROCEDURE — 3700000001 HC ADD 15 MINUTES (ANESTHESIA)

## 2021-01-25 PROCEDURE — C1760 CLOSURE DEV, VASC: HCPCS

## 2021-01-25 PROCEDURE — 6360000004 HC RX CONTRAST MEDICATION: Performed by: PSYCHIATRY & NEUROLOGY

## 2021-01-25 PROCEDURE — 86850 RBC ANTIBODY SCREEN: CPT

## 2021-01-25 PROCEDURE — 82435 ASSAY OF BLOOD CHLORIDE: CPT

## 2021-01-25 PROCEDURE — 3700000000 HC ANESTHESIA ATTENDED CARE

## 2021-01-25 PROCEDURE — 84132 ASSAY OF SERUM POTASSIUM: CPT

## 2021-01-25 PROCEDURE — 03VG3HZ RESTRICTION OF INTRACRANIAL ARTERY WITH INTRALUMINAL DEVICE, FLOW DIVERTER, PERCUTANEOUS APPROACH: ICD-10-PCS | Performed by: PSYCHIATRY & NEUROLOGY

## 2021-01-25 PROCEDURE — 6360000002 HC RX W HCPCS: Performed by: PSYCHIATRY & NEUROLOGY

## 2021-01-25 PROCEDURE — 51798 US URINE CAPACITY MEASURE: CPT

## 2021-01-25 PROCEDURE — B31RYZZ FLUOROSCOPY OF INTRACRANIAL ARTERIES USING OTHER CONTRAST: ICD-10-PCS | Performed by: PSYCHIATRY & NEUROLOGY

## 2021-01-25 RX ORDER — LABETALOL HYDROCHLORIDE 5 MG/ML
10 INJECTION, SOLUTION INTRAVENOUS EVERY 10 MIN PRN
Status: DISCONTINUED | OUTPATIENT
Start: 2021-01-25 | End: 2021-01-25

## 2021-01-25 RX ORDER — PROTAMINE SULFATE 10 MG/ML
INJECTION, SOLUTION INTRAVENOUS PRN
Status: DISCONTINUED | OUTPATIENT
Start: 2021-01-25 | End: 2021-01-25 | Stop reason: SDUPTHER

## 2021-01-25 RX ORDER — SODIUM CHLORIDE 9 MG/ML
INJECTION, SOLUTION INTRAVENOUS CONTINUOUS
Status: DISCONTINUED | OUTPATIENT
Start: 2021-01-25 | End: 2021-01-28

## 2021-01-25 RX ORDER — ONDANSETRON 2 MG/ML
4 INJECTION INTRAMUSCULAR; INTRAVENOUS EVERY 6 HOURS PRN
Status: DISCONTINUED | OUTPATIENT
Start: 2021-01-25 | End: 2021-01-26

## 2021-01-25 RX ORDER — PROMETHAZINE HYDROCHLORIDE 12.5 MG/1
12.5 TABLET ORAL EVERY 6 HOURS PRN
Status: DISCONTINUED | OUTPATIENT
Start: 2021-01-25 | End: 2021-01-26

## 2021-01-25 RX ORDER — SENNA AND DOCUSATE SODIUM 50; 8.6 MG/1; MG/1
1 TABLET, FILM COATED ORAL 2 TIMES DAILY
Status: DISCONTINUED | OUTPATIENT
Start: 2021-01-25 | End: 2021-01-28 | Stop reason: HOSPADM

## 2021-01-25 RX ORDER — CLINDAMYCIN PHOSPHATE 600 MG/50ML
INJECTION INTRAVENOUS PRN
Status: DISCONTINUED | OUTPATIENT
Start: 2021-01-25 | End: 2021-01-25 | Stop reason: SDUPTHER

## 2021-01-25 RX ORDER — IODIXANOL 270 MG/ML
101 INJECTION, SOLUTION INTRAVASCULAR
Status: COMPLETED | OUTPATIENT
Start: 2021-01-25 | End: 2021-01-25

## 2021-01-25 RX ORDER — SODIUM CHLORIDE, SODIUM LACTATE, POTASSIUM CHLORIDE, CALCIUM CHLORIDE 600; 310; 30; 20 MG/100ML; MG/100ML; MG/100ML; MG/100ML
INJECTION, SOLUTION INTRAVENOUS CONTINUOUS PRN
Status: DISCONTINUED | OUTPATIENT
Start: 2021-01-25 | End: 2021-01-25 | Stop reason: SDUPTHER

## 2021-01-25 RX ORDER — PROPOFOL 10 MG/ML
INJECTION, EMULSION INTRAVENOUS PRN
Status: DISCONTINUED | OUTPATIENT
Start: 2021-01-25 | End: 2021-01-25 | Stop reason: SDUPTHER

## 2021-01-25 RX ORDER — HEPARIN SODIUM 1000 [USP'U]/ML
INJECTION, SOLUTION INTRAVENOUS; SUBCUTANEOUS PRN
Status: DISCONTINUED | OUTPATIENT
Start: 2021-01-25 | End: 2021-01-25 | Stop reason: SDUPTHER

## 2021-01-25 RX ORDER — CLOPIDOGREL BISULFATE 75 MG/1
75 TABLET ORAL DAILY
Status: DISCONTINUED | OUTPATIENT
Start: 2021-01-25 | End: 2021-01-26

## 2021-01-25 RX ORDER — ACETAMINOPHEN 325 MG/1
650 TABLET ORAL EVERY 4 HOURS PRN
Status: DISCONTINUED | OUTPATIENT
Start: 2021-01-25 | End: 2021-01-26

## 2021-01-25 RX ORDER — PROMETHAZINE HYDROCHLORIDE 12.5 MG/1
12.5 TABLET ORAL EVERY 6 HOURS PRN
Status: DISCONTINUED | OUTPATIENT
Start: 2021-01-25 | End: 2021-01-28 | Stop reason: HOSPADM

## 2021-01-25 RX ORDER — SODIUM CHLORIDE 0.9 % (FLUSH) 0.9 %
10 SYRINGE (ML) INJECTION PRN
Status: DISCONTINUED | OUTPATIENT
Start: 2021-01-25 | End: 2021-01-28 | Stop reason: HOSPADM

## 2021-01-25 RX ORDER — ROCURONIUM BROMIDE 10 MG/ML
INJECTION, SOLUTION INTRAVENOUS PRN
Status: DISCONTINUED | OUTPATIENT
Start: 2021-01-25 | End: 2021-01-25 | Stop reason: SDUPTHER

## 2021-01-25 RX ORDER — FENTANYL CITRATE 50 UG/ML
INJECTION, SOLUTION INTRAMUSCULAR; INTRAVENOUS PRN
Status: DISCONTINUED | OUTPATIENT
Start: 2021-01-25 | End: 2021-01-25 | Stop reason: SDUPTHER

## 2021-01-25 RX ORDER — ACETAMINOPHEN 325 MG/1
650 TABLET ORAL EVERY 6 HOURS PRN
Status: DISCONTINUED | OUTPATIENT
Start: 2021-01-25 | End: 2021-01-28 | Stop reason: HOSPADM

## 2021-01-25 RX ORDER — SODIUM CHLORIDE 0.9 % (FLUSH) 0.9 %
10 SYRINGE (ML) INJECTION EVERY 12 HOURS SCHEDULED
Status: DISCONTINUED | OUTPATIENT
Start: 2021-01-25 | End: 2021-01-28 | Stop reason: HOSPADM

## 2021-01-25 RX ORDER — FENTANYL CITRATE 50 UG/ML
50 INJECTION, SOLUTION INTRAMUSCULAR; INTRAVENOUS ONCE
Status: COMPLETED | OUTPATIENT
Start: 2021-01-25 | End: 2021-01-25

## 2021-01-25 RX ORDER — POLYETHYLENE GLYCOL 3350 17 G/17G
17 POWDER, FOR SOLUTION ORAL DAILY PRN
Status: DISCONTINUED | OUTPATIENT
Start: 2021-01-25 | End: 2021-01-28 | Stop reason: HOSPADM

## 2021-01-25 RX ORDER — LABETALOL HYDROCHLORIDE 5 MG/ML
10 INJECTION, SOLUTION INTRAVENOUS EVERY 10 MIN PRN
Status: DISCONTINUED | OUTPATIENT
Start: 2021-01-25 | End: 2021-01-26

## 2021-01-25 RX ORDER — DEXAMETHASONE SODIUM PHOSPHATE 4 MG/ML
4 INJECTION, SOLUTION INTRA-ARTICULAR; INTRALESIONAL; INTRAMUSCULAR; INTRAVENOUS; SOFT TISSUE EVERY 8 HOURS
Status: DISCONTINUED | OUTPATIENT
Start: 2021-01-25 | End: 2021-01-27

## 2021-01-25 RX ORDER — KETOROLAC TROMETHAMINE 30 MG/ML
15 INJECTION, SOLUTION INTRAMUSCULAR; INTRAVENOUS ONCE
Status: COMPLETED | OUTPATIENT
Start: 2021-01-25 | End: 2021-01-25

## 2021-01-25 RX ORDER — ONDANSETRON 2 MG/ML
4 INJECTION INTRAMUSCULAR; INTRAVENOUS EVERY 6 HOURS PRN
Status: DISCONTINUED | OUTPATIENT
Start: 2021-01-25 | End: 2021-01-28 | Stop reason: HOSPADM

## 2021-01-25 RX ORDER — ACETAMINOPHEN 650 MG/1
650 SUPPOSITORY RECTAL EVERY 6 HOURS PRN
Status: DISCONTINUED | OUTPATIENT
Start: 2021-01-25 | End: 2021-01-28 | Stop reason: HOSPADM

## 2021-01-25 RX ORDER — LIDOCAINE HYDROCHLORIDE 10 MG/ML
INJECTION, SOLUTION EPIDURAL; INFILTRATION; INTRACAUDAL; PERINEURAL PRN
Status: DISCONTINUED | OUTPATIENT
Start: 2021-01-25 | End: 2021-01-25 | Stop reason: SDUPTHER

## 2021-01-25 RX ADMIN — FENTANYL CITRATE 50 MCG: 50 INJECTION, SOLUTION INTRAMUSCULAR; INTRAVENOUS at 18:21

## 2021-01-25 RX ADMIN — SODIUM CHLORIDE: 9 INJECTION, SOLUTION INTRAVENOUS at 19:43

## 2021-01-25 RX ADMIN — ROCURONIUM BROMIDE 10 MG: 10 INJECTION INTRAVENOUS at 12:07

## 2021-01-25 RX ADMIN — FENTANYL CITRATE 75 MCG: 50 INJECTION, SOLUTION INTRAMUSCULAR; INTRAVENOUS at 10:45

## 2021-01-25 RX ADMIN — KETOROLAC TROMETHAMINE 15 MG: 30 INJECTION, SOLUTION INTRAMUSCULAR at 15:14

## 2021-01-25 RX ADMIN — SODIUM CHLORIDE, PRESERVATIVE FREE 10 ML: 5 INJECTION INTRAVENOUS at 19:42

## 2021-01-25 RX ADMIN — ACETAMINOPHEN 650 MG: 325 TABLET ORAL at 23:21

## 2021-01-25 RX ADMIN — DOCUSATE SODIUM 50MG AND SENNOSIDES 8.6MG 1 TABLET: 8.6; 5 TABLET, FILM COATED ORAL at 21:29

## 2021-01-25 RX ADMIN — ONDANSETRON 4 MG: 2 INJECTION INTRAMUSCULAR; INTRAVENOUS at 15:13

## 2021-01-25 RX ADMIN — ROCURONIUM BROMIDE 10 MG: 10 INJECTION INTRAVENOUS at 11:50

## 2021-01-25 RX ADMIN — ROCURONIUM BROMIDE 50 MG: 10 INJECTION INTRAVENOUS at 10:46

## 2021-01-25 RX ADMIN — SODIUM CHLORIDE, POTASSIUM CHLORIDE, SODIUM LACTATE AND CALCIUM CHLORIDE: 600; 310; 30; 20 INJECTION, SOLUTION INTRAVENOUS at 10:30

## 2021-01-25 RX ADMIN — SUGAMMADEX 400 MG: 100 INJECTION, SOLUTION INTRAVENOUS at 12:43

## 2021-01-25 RX ADMIN — PROPOFOL 200 MG: 10 INJECTION, EMULSION INTRAVENOUS at 10:45

## 2021-01-25 RX ADMIN — FENTANYL CITRATE 25 MCG: 50 INJECTION, SOLUTION INTRAMUSCULAR; INTRAVENOUS at 10:42

## 2021-01-25 RX ADMIN — CLINDAMYCIN IN 5 PERCENT DEXTROSE 600 MG: 12 INJECTION, SOLUTION INTRAVENOUS at 11:14

## 2021-01-25 RX ADMIN — PROTAMINE SULFATE 40 MG: 10 INJECTION, SOLUTION INTRAVENOUS at 12:35

## 2021-01-25 RX ADMIN — HEPARIN SODIUM 6000 UNITS: 1000 INJECTION INTRAVENOUS; SUBCUTANEOUS at 11:23

## 2021-01-25 RX ADMIN — DEXAMETHASONE SODIUM PHOSPHATE 4 MG: 4 INJECTION, SOLUTION INTRAMUSCULAR; INTRAVENOUS at 19:42

## 2021-01-25 RX ADMIN — IODIXANOL 101 ML: 270 INJECTION, SOLUTION INTRAVASCULAR at 12:37

## 2021-01-25 RX ADMIN — LIDOCAINE HYDROCHLORIDE 50 MG: 10 INJECTION, SOLUTION EPIDURAL; INFILTRATION; INTRACAUDAL; PERINEURAL at 10:45

## 2021-01-25 RX ADMIN — HEPARIN SODIUM 2000 UNITS: 1000 INJECTION INTRAVENOUS; SUBCUTANEOUS at 11:55

## 2021-01-25 RX ADMIN — Medication 10 MG: at 16:06

## 2021-01-25 RX ADMIN — SODIUM CHLORIDE, POTASSIUM CHLORIDE, SODIUM LACTATE AND CALCIUM CHLORIDE: 600; 310; 30; 20 INJECTION, SOLUTION INTRAVENOUS at 11:05

## 2021-01-25 RX ADMIN — SODIUM CHLORIDE, POTASSIUM CHLORIDE, SODIUM LACTATE AND CALCIUM CHLORIDE: 600; 310; 30; 20 INJECTION, SOLUTION INTRAVENOUS at 12:43

## 2021-01-25 RX ADMIN — ACETAMINOPHEN 650 MG: 325 TABLET ORAL at 13:52

## 2021-01-25 RX ADMIN — Medication 10 MG: at 15:00

## 2021-01-25 ASSESSMENT — PULMONARY FUNCTION TESTS
PIF_VALUE: 24
PIF_VALUE: 24
PIF_VALUE: 0
PIF_VALUE: 25
PIF_VALUE: 2
PIF_VALUE: 24
PIF_VALUE: 23
PIF_VALUE: 12
PIF_VALUE: 23
PIF_VALUE: 7
PIF_VALUE: 21
PIF_VALUE: 21
PIF_VALUE: 22
PIF_VALUE: 24
PIF_VALUE: 21
PIF_VALUE: 22
PIF_VALUE: 0
PIF_VALUE: 1
PIF_VALUE: 0
PIF_VALUE: 23
PIF_VALUE: 23
PIF_VALUE: 24
PIF_VALUE: 24
PIF_VALUE: 23
PIF_VALUE: 21
PIF_VALUE: 22
PIF_VALUE: 21
PIF_VALUE: 22
PIF_VALUE: 24
PIF_VALUE: 2
PIF_VALUE: 23
PIF_VALUE: 22
PIF_VALUE: 1
PIF_VALUE: 22
PIF_VALUE: 23
PIF_VALUE: 24
PIF_VALUE: 22
PIF_VALUE: 23
PIF_VALUE: 23
PIF_VALUE: 0
PIF_VALUE: 23
PIF_VALUE: 24
PIF_VALUE: 22
PIF_VALUE: 21
PIF_VALUE: 0
PIF_VALUE: 22
PIF_VALUE: 22
PIF_VALUE: 24
PIF_VALUE: 28
PIF_VALUE: 23
PIF_VALUE: 24
PIF_VALUE: 21
PIF_VALUE: 23
PIF_VALUE: 22
PIF_VALUE: 24
PIF_VALUE: 35
PIF_VALUE: 23
PIF_VALUE: 0
PIF_VALUE: 1
PIF_VALUE: 23
PIF_VALUE: 24
PIF_VALUE: 21
PIF_VALUE: 23
PIF_VALUE: 1
PIF_VALUE: 24
PIF_VALUE: 22
PIF_VALUE: 23
PIF_VALUE: 23
PIF_VALUE: 21
PIF_VALUE: 25
PIF_VALUE: 1
PIF_VALUE: 23
PIF_VALUE: 2
PIF_VALUE: 23

## 2021-01-25 ASSESSMENT — PAIN DESCRIPTION - PAIN TYPE
TYPE: ACUTE PAIN
TYPE: ACUTE PAIN

## 2021-01-25 ASSESSMENT — PAIN SCALES - GENERAL
PAINLEVEL_OUTOF10: 8
PAINLEVEL_OUTOF10: 5
PAINLEVEL_OUTOF10: 5
PAINLEVEL_OUTOF10: 0
PAINLEVEL_OUTOF10: 6

## 2021-01-25 ASSESSMENT — PAIN - FUNCTIONAL ASSESSMENT: PAIN_FUNCTIONAL_ASSESSMENT: 0-10

## 2021-01-25 NOTE — PROGRESS NOTES
Brief endovascular note    Writer was called to bedside with report of pt being somnolent and with new RUE weakness. On exam pt has subtle RUE hand weakness but no drift , deltoid at least 4+/5. Pt also complaints of migraine headache. ddx small acute stroke, complicated migraine. Recs:  --liberalize bp, <180  --NS 100cc/hr  --decadron 4mg iv q8h until discharge. --monitor exam.    Case discsused with Dr. America Hernandez attending.

## 2021-01-25 NOTE — PROGRESS NOTES
Notified Dr. Foreign Maguire via perfect serve to reach to family for an update. We also, notified regarding the patients difficulty urinating.

## 2021-01-25 NOTE — ANESTHESIA PRE PROCEDURE
Department of Anesthesiology  Preprocedure Note       Name:  Sae Husbands   Age:  38 y.o.  :  1980                                          MRN:  [id number]         Date:  2021      Surgeon: * No surgeons listed *    Procedure: * No procedures listed *    Medications prior to admission:   Prior to Admission medications    Medication Sig Start Date End Date Taking? Authorizing Provider   clopidogrel (PLAVIX) 75 MG tablet Take 1 tablet by mouth daily 21  Yes Lorie Renteria MD   aspirin  MG EC tablet Take 1 tablet by mouth daily 21  Yes Lorie Renteria MD   ibuprofen (ADVIL;MOTRIN) 800 MG tablet Take 1 tablet by mouth every 6 hours as needed for Pain 21  Yes RUDOLPH Valles CNP   norelgestromin-ethinyl estradiol (ORTHO EVRA) 150-35 MCG/24HR Place 1 patch onto the skin once a week Place continuously, and after 3 months of continuous patches take a break for a period. 20  Yes Modesto Hunter MD   Ubrogepant 100 MG TABS Take one at onset of migraine. May repeat in 2 hours. No more than 2 in 24 hours and 4 in 1 week 21   RUDOLPH Valles CNP   Galcanezumab-gnlm (EMGALITY) 120 MG/ML SOAJ Inject 120 mg into the skin every 30 days 20   RUDOLPH Valles CNP       Current medications:    Current Outpatient Medications   Medication Sig Dispense Refill    clopidogrel (PLAVIX) 75 MG tablet Take 1 tablet by mouth daily 90 tablet 1    aspirin  MG EC tablet Take 1 tablet by mouth daily 90 tablet 1    ibuprofen (ADVIL;MOTRIN) 800 MG tablet Take 1 tablet by mouth every 6 hours as needed for Pain 60 tablet 2    norelgestromin-ethinyl estradiol (ORTHO EVRA) 150-35 MCG/24HR Place 1 patch onto the skin once a week Place continuously, and after 3 months of continuous patches take a break for a period. 4 patch 16    Ubrogepant 100 MG TABS Take one at onset of migraine. May repeat in 2 hours.   No more than 2 in 24 hours and 4 in 1 week 10 tablet 5 RBC 3.96 02/07/2012    HGB 12.7 05/17/2018    HCT 38.9 05/17/2018    MCV 89.2 05/17/2018    RDW 13.0 05/17/2018     05/17/2018     02/07/2012       CMP:   Lab Results   Component Value Date     05/17/2018    K 4.7 05/17/2018     05/17/2018    CO2 20 05/17/2018    BUN 9 05/17/2018    CREATININE 0.74 05/17/2018    GFRAA >60 05/17/2018    LABGLOM >60 05/17/2018    GLUCOSE 77 05/17/2018    GLUCOSE 84 02/07/2012    PROT 7.5 05/17/2018    CALCIUM 8.7 05/17/2018    BILITOT 0.30 05/17/2018    ALKPHOS 50 05/17/2018    AST 12 05/17/2018    ALT 6 05/17/2018       POC Tests:   Recent Labs     01/25/21  0735   POCGLU 91   POCNA 138   POCK 4.0   POCCL 106       Coags: No results found for: PROTIME, INR, APTT    HCG (If Applicable):   Lab Results   Component Value Date    PREGTESTUR NEGATIVE 11/19/2016    HCG NEGATIVE 11/19/2016        ABGs: No results found for: PHART, PO2ART, ZSN0WLA, LMS1ZNO, BEART, H6HCMZWW     Type & Screen (If Applicable):  No results found for: LABABO, LABRH    Drug/Infectious Status (If Applicable):  No results found for: HIV, HEPCAB    COVID-19 Screening (If Applicable):   Lab Results   Component Value Date    COVID19 Not Detected 01/21/2021         Anesthesia Evaluation    Airway: Mallampati: II     Neck ROM: full   Dental: normal exam         Pulmonary:Negative Pulmonary ROS breath sounds clear to auscultation                             Cardiovascular:Negative CV ROS            Rhythm: regular  Rate: normal                    Neuro/Psych:   (+) headaches:,              ROS comment: Carotid aneurysm, left (HCC) GI/Hepatic/Renal: Neg GI/Hepatic/Renal ROS            Endo/Other: Negative Endo/Other ROS                    Abdominal:   (+) obese,         Vascular: negative vascular ROS. Anesthesia Plan      general and MAC     ASA 2       Induction: intravenous. arterial line    Anesthetic plan and risks discussed with patient and spouse. Use of blood products discussed with patient whom. Plan discussed with CRNA.                   Valery Toure MD   1/25/2021

## 2021-01-25 NOTE — H&P
Neuro ICU History & Physical    Patient Name: Diony Magdaleno  Patient : 1980  Room/Bed: 300530-01  Code Status: Full Code  Allergies: Allergies   Allergen Reactions    Amoxicillin Swelling     lips    Penicillins Other (See Comments)     Pt. Unsure of reaction       CHIEF COMPLAINT     Elective aneurysm coiling    HPI    History Obtained From: EMR and pt    The patient is a 38 y.o. female, with a history of left carotid aneurysm and migraines, presented for elective aneurysm for coiling. In 2021, patient had worsening headaches hydrophilia and CTA revealed a left paraophthalmic ICA aneurysm. Patient was placed on dual antiplatelet therapy of 302 and 75 daily. Patient denied any complaints prior to her operation of coiling. Patient had a coiling done without any complications and she is hemodynamically stable. Patient notes that she is having a headache but denies any current fevers, chills, shortness breath, chest pain, abdominal pain, nausea/vomiting, numbness/tingling, or weakness.       Admitted to ICU From: Outpatient   Reason for ICU Admission: Aneurysm coiling       PATIENT HISTORY   Past Medical History:        Diagnosis Date    Carotid aneurysm, left ([address]     Migraines     Wellness examination     PCP Willem Castillo MD/jesusita/ last seen        Past Surgical History:        Procedure Laterality Date    CARDIAC CATHETERIZATION  2021    IR ANGIOGRAM CAROTID CEREBRAL BILATERAL    CARDIAC CATHETERIZATION      IR ANGIOGRAM CAROTID CEREBRAL BILATERAL    CARDIAC CATHETERIZATION  2021    IR ANGIOGRAM CAROTID CEREBRAL BILATERAL       Social History:   Social History     Socioeconomic History    Marital status: Single     Spouse name: Not on file    Number of children: Not on file    Years of education: Not on file    Highest education level: Not on file   Occupational History    Not on file   Social Needs    Financial resource strain: Not on file   [place] insecurity     Worry: Not on file     Inability: Not on file    Transportation needs     Medical: Not on file     Non-medical: Not on file   Tobacco Use    Smoking status: Never Smoker    Smokeless tobacco: Never Used   Substance and Sexual Activity    Alcohol use: Yes     Comment: 3 glasses weekly    Drug use: Not Currently    Sexual activity: Yes     Partners: Male   Lifestyle    Physical activity     Days per week: Not on file     Minutes per session: Not on file    Stress: Not on file   Relationships    Social connections     Talks on phone: Not on file     Gets together: Not on file     Attends Zoroastrian service: Not on file     Active member of club or organization: Not on file     Attends meetings of clubs or organizations: Not on file     Relationship status: Not on file    Intimate partner violence     Fear of current or ex partner: Not on file     Emotionally abused: Not on file     Physically abused: Not on file     Forced sexual activity: Not on file   Other Topics Concern    Not on file   Social History Narrative    Not on file       Family History:       Problem Relation Age of Onset    Kidney Disease Mother     Heart Disease Mother     Hypertension Mother     Migraines Mother     High Blood Pressure Mother     Heart Disease Father     Colon Cancer Father     Diabetes Father     Cancer Father         prostate    Heart Disease Brother        Allergies:    Amoxicillin and Penicillins    Medications Prior to Admission:    No medications prior to admission.     Current Medications:  Current Facility-Administered Medications: acetaminophen (TYLENOL) tablet 650 mg, 650 mg, Oral, Q4H PRN  promethazine (PHENERGAN) tablet 12.5 mg, 12.5 mg, Oral, Q6H PRN **OR** ondansetron (ZOFRAN) injection 4 mg, 4 mg, Intravenous, Q6H PRN  [Held by provider] aspirin EC tablet 325 mg, 325 mg, Oral, Daily  [Held by provider] clopidogrel (PLAVIX) tablet 75 mg, 75 mg, Oral, Daily  sodium chloride flush 0.9 % injection 10 mL, 10 mL, Intravenous, 2 times per day  sodium chloride flush 0.9 % injection 10 mL, 10 mL, Intravenous, PRN  promethazine (PHENERGAN) tablet 12.5 mg, 12.5 mg, Oral, Q6H PRN **OR** ondansetron (ZOFRAN) injection 4 mg, 4 mg, Intravenous, Q6H PRN  polyethylene glycol (GLYCOLAX) packet 17 g, 17 g, Oral, Daily PRN  acetaminophen (TYLENOL) tablet 650 mg, 650 mg, Oral, Q6H PRN **OR** acetaminophen (TYLENOL) suppository 650 mg, 650 mg, Rectal, Q6H PRN  sennosides-docusate sodium (SENOKOT-S) 8.6-50 MG tablet 1 tablet, 1 tablet, Oral, BID    REVIEW OF SYSTEMS     CONSTITUTIONAL: negative for fatigue and malaise   EYES: negative for double vision and photophobia    HEENT: negative for tinnitus and sore throat   RESPIRATORY: negative for cough, shortness of breath   CARDIOVASCULAR: negative for chest pain, palpitations, or syncope   GASTROINTESTINAL: negative for abdominal pain, nausea, vomiting, diarrhea, or constipation    GENITOURINARY: negative for incontinence or retention    MUSCULOSKELETAL: negative for neck or back pain, negative for extremity pain   NEUROLOGICAL: Negative for seizures, weakness, numbness, confusion, aphasia, dysarthria, but +++ headache   PSYCHIATRIC: negative for agitation, hallucination, SI/HI   SKIN Negative for spontaneous contusions, rashes, or lesions      PHYSICAL EXAM:     BP (!) 141/88   Pulse 72   Temp 96.7 °F (35.9 °C) (Temporal)   Resp 15   Ht 5' 2\" (1.575 m)   Wt 190 lb (86.2 kg)   LMP 10/21/2020   SpO2 100%   BMI 34.75 kg/m²     PHYSICAL EXAM:  CONSTITUTIONAL:  Well developed, well nourished, alert and oriented x 3, in no acute distress. GCS 15. Nontoxic. No dysarthria. No aphasia.    HEAD:  normocephalic, atraumatic    EYES:  PERRLA, EOMI.   ENT:  moist mucous membranes   LUNGS:  Equal air entry bilaterally   CARDIOVASCULAR:  normal s1 / s2   ABDOMEN:  Soft, no rigidity   NECK supple, symmetric, no midline tenderness to palpation    BACK without midline tenderness, LABS AND IMAGING:     RECENT LABS:  CBC with Differential:    Lab Results   Component Value Date    WBC 7.0 05/17/2018    RBC 4.36 05/17/2018    RBC 3.96 02/07/2012    HGB 12.7 05/17/2018    HCT 38.9 05/17/2018     05/17/2018     02/07/2012    MCV 89.2 05/17/2018    MCH 29.1 05/17/2018    MCHC 32.6 05/17/2018    RDW 13.0 05/17/2018    LYMPHOPCT 44 05/17/2018    MONOPCT 8 05/17/2018    BASOPCT 0 05/17/2018    MONOSABS 0.58 05/17/2018    LYMPHSABS 3.05 05/17/2018    EOSABS 0.14 05/17/2018    BASOSABS <0.03 05/17/2018    DIFFTYPE NOT REPORTED 05/17/2018     BMP:    Lab Results   Component Value Date     05/17/2018    K 4.7 05/17/2018     05/17/2018    CO2 20 05/17/2018    BUN 9 05/17/2018    LABALBU 3.5 05/17/2018    CREATININE 0.74 05/17/2018    CALCIUM 8.7 05/17/2018    GFRAA >60 05/17/2018    LABGLOM >60 05/17/2018    GLUCOSE 77 05/17/2018    GLUCOSE 84 02/07/2012       RADIOLOGY:   No results found. Labs and Images reviewed with:    [] Joss Sidhu MD    [x] Last Pickett MD  [] Christian Cuenca MD  --[] there are no new interval images to review. ASSESSMENT AND PLAN:         ASSESSMENT:     This is a 38 y.o. female with history of left carotid aneurysm and migraines, who presents for elective coiling. Patient care will be discussed with attending, will reevaluate patient along with attending.      PLAN/MEDICAL DECISION MAKING:        NEUROLOGIC:  - Goal -200 mmHg  - Continue DAPT tomorrow  - Follow up CT Head  - Neuro checks per protocol    CARDIOVASCULAR:  - Goal -200 mmHg   - Labetalol 10 mg PRN  - Continue telemetry    PULMONARY:  - on RA    RENAL/FLUID/ELECTROLYTE:  - Await BMP  - Monitor urine I's and O's  - Replace electrolytes PRN  - Daily BMP    GI/NUTRITION:  NUTRITION:  Diet NPO Effective Now  - Bowel regimen: senokot-s and glycolax PRN  - GI prophylaxis: none    ID:  - Afebrile  - Await CBC  - Continue to monitor for fevers  - Daily CBC    HEME: - Await CBC  - Daily CBC    ENDOCRINE:  - Continue to monitor blood glucose, goal <180      OTHER:  - PT/OT    PROPHYLAXIS:  Stress ulcer: None    DVT PROPHYLAXIS:  - SCD sleeves - Thigh High   - No chemoprophylaxis anticoagulation at this time.       DISPOSITION: Admit to Neuro ICU      Hamzah Giles DO  Neuro Critical Care Service   Pager [phone]  1/25/2021     1:51 PM

## 2021-01-25 NOTE — H&P
Endovascular Neurosurgery Consult    Pt Name: Verito Salazar  MRN: [id number]  [place]: 1980  Date of evaluation: 1/25/2021  Primary Care Physician: Allyson Boyce MD  Reason for evaluation:    SUBJECTIVE:   History of Chief Complaint:    Verito Salazar is a 38 y.o. female who is -American with past medical history including migraine headache. She was seen recently in the neuro neurology clinic in January 2021 and evaluated for worsening headache behind the left eye. She underwent CTA head and neck showing 5 mm left parophthalmic ICA aneurysm. She presented today for elective treatment. She is on dual antiplatelet therapy with aspirin 325 and Plavix 75 mg daily. She was accompanied by her . She is doing good. No new weakness. No speech difficulty. She is n.p.o. for procedure. Allergies  is allergic to amoxicillin and penicillins. Medications  Prior to Admission medications    Medication Sig Start Date End Date Taking? Authorizing Provider   clopidogrel (PLAVIX) 75 MG tablet Take 1 tablet by mouth daily 1/20/21  Yes Hayley Andrade MD   aspirin  MG EC tablet Take 1 tablet by mouth daily 1/20/21  Yes Hayley Andrade MD   ibuprofen (ADVIL;MOTRIN) 800 MG tablet Take 1 tablet by mouth every 6 hours as needed for Pain 1/4/21  Yes RUDOLPH Sanchez CNP   norelgestromin-ethinyl estradiol (ORTHO EVRA) 150-35 MCG/24HR Place 1 patch onto the skin once a week Place continuously, and after 3 months of continuous patches take a break for a period. 2/28/20  Yes Ysabel Hendricks MD   Ubrogepant 100 MG TABS Take one at onset of migraine. May repeat in 2 hours.   No more than 2 in 24 hours and 4 in 1 week 1/4/21   RUDOLPH Sanchez CNP   Galcanezumab-gnlm [facility]) 120 MG/ML SOAJ Inject 120 mg into the skin every 30 days 7/2/20   RUDOLPH Sanchez CNP    Scheduled Meds:  Continuous Infusions:  PRN Meds:.  Past Medical History   has a past medical history of Carotid aneurysm, left ([address] Migraines, and Wellness examination. Past Surgical History   has no past surgical history on file. Social History   reports that she has never smoked. She has never used smokeless tobacco.   reports current alcohol use. reports previous drug use. Family History  family history includes Cancer in her father; Colon Cancer in her father; Diabetes in her father; Heart Disease in her brother, father, and mother; High Blood Pressure in her mother; Hypertension in her mother; Kidney Disease in her mother; Migraines in her mother. Review of Systems:  CONSTITUTIONAL:  negative for fevers, chills, fatigue and malaise    EYES:  negative for double vision, blurred vision and photophobia     HEENT:  negative for tinnitus, epistaxis and sore throat    RESPIRATORY:  negative for cough, shortness of breath, wheezing    CARDIOVASCULAR:  negative for chest pain, palpitations, syncope, edema    GASTROINTESTINAL:  negative for nausea, vomiting    GENITOURINARY:  negative for incontinence    MUSCULOSKELETAL:  negative for neck or back pain    NEUROLOGICAL:  Negative for weakness and tingling  negative for headaches and dizziness    PSYCHIATRIC:  negative for anxiety      Review of systems otherwise negative. OBJECTIVE:   Vitals: BP (!) 130/93   Pulse 76   Temp 97.2 °F (36.2 °C) (Temporal)   Resp 18   Ht 5' 2\" (1.575 m)   Wt 190 lb (86.2 kg)   LMP 10/21/2020   SpO2 99%   BMI 34.75 kg/m²   General appearance: Lying in bed, NAD. HEENT: Atraumatic. Neck: Neck is supple. Lungs: No respiratory distress noted. Heart: normal sinus rhythm on tele. .   Abdomen: Soft nontender. Extremities: No lower limb edema noted. Neurologic: wake, following simple commands appropriately, able to name simple objects, intact comprehension, no dysarthria noted.   CN: Has intact extraocular muscles movements, no facial droop noted  MOTOR: Has good strength in both upper and lower extremities, moving both upper and lower extremities against gravity with no drift. SENSORY: Intact sensation to simple touch bilaterally in both upper and lower extremities. COORDINATION: Intact finger-nose test bilaterally with no dysmetria noted. LABS:   No results for input(s): WBC, HGB, HCT, PLT, NA, K, CL, CO2, BUN, CREATININE, MG, PHOS, CALCIUM, PTT, INR, AST, ALT, BILITOT, BILIDIR, NITRU, COLORU, BACTERIA in the last 72 hours. Invalid input(s): PT, WBCU, RBCU, LEUKOCYTESUA  No results for input(s): ALKPHOS, ALT, AST, BILITOT, BILIDIR, LABALBU, AMYLASE, LIPASE in the last 72 hours. RADIOLOGY:   Images were personally reviewed including:  CTA head and neck  1. No acute intracranial abnormality. 2. Saccular 5 mm aneurysm arising in the region of the left paraophthalmic   ICA. 3. Otherwise, unremarkable CTA of the head and neck. IMPRESSIONS:     1. Left parophthalmic ICA 5 mm aneurysm. 2. Recent worsening headaches. Cerebral angiogram with IV moderate sedation. Risks and benefits discussed including but not limited to bruising, stroke, sah, death, retroperitoneal hematoma, femoral pseudoaneurysm, lower ext and renal as well as peripheral vasc compromise discussed - informed consent obtained at bedside from the patient. PLANS:   1. Keep patient n.p.o.  2. Continue dual antiplatelet therapy with aspirin and Plavix. 3. Proceed for aneurysm treatment. Thank you for the interesting evaluation.     Maria Elena Babin MD  Pager [phone]  Stroke, [facility]  [id number] [place]  Electronically signed 1/25/2021 at 10:29 AM

## 2021-01-25 NOTE — OP NOTE
[facility]    NEUROENDOVASCULAR SERVICE: POST-OP NOTE: 1/25/2021    Pt Name: Mahad Hendricks  MRN: [id number]  [place]: 1980  Date of Procedure: 1/25/2021  Primary Care Physician: Saba Raphael MD    Pre-Procedural Diagnosis: Left superior hypophyseal aneurysm. Post-Procedural Diagnosis: This was successfully treated using flow diversion (surpass device 3.25 x 15 mm)    Procedure Performed:Cerebral angiogram with flow diversion of cerebral aneurysm. aneurysm embolization    Surgeon:   Tiara Dukes MD    Fellow:  Warren Weldon MD and Derick Reagan MD, PhD     1st Assistant:  Celena Hanna    PRE-PROCEDURAL EXAM:  Prestroke baseline mRS MODIFIED ERLIN SCORE: 1 - No significant disability: despite symptoms, able to carry out all usual duties and activities. Neurological exam performed and unchanged from initial H&P or consult    Anesthesia: General Anesthesia  Complications: none    Intra-Operative EXAM:  Neurological exam performed and unchanged from initial H&P or consult    EBL: < Minimal      Cc            Specimens: Were not Obtained  Contrast:     Visipaque 270 low osmolar 101 Cc             Fluoro: 20.9 min    Findings:  Please see dictated Radiology note for further details  1. Left superior hypophyseal aneurysm projecting medially measuring approximately 5.15 x 4.2 x 4.5 mm with neck size at 2.78 mm. Proximal parent vessel diameter at 2.98 mm. 2. The above-mentioned aneurysm was successfully treated using flow diversion with surpass 3.25 x 15 mm. The flow diverter appears in good wall apposition. There is contrast stagnation noted in the aneurysm with Cooper Rough class at 3. No distal thromboembolic complications noted.     Asad score: class III        Post surpass          POST-PROCEDURAL EXAM :   Stable neurological Exam  Neurological exam performed and unchanged from initial H&P or consult    Closure:  right Star[employer] 6   F    POST-PROCEDURAL MONITORING : see orders  Disposition: Neuro ICU    Recommendations:  Back to NSICU. Do not bend right leg for 3 hours. Groin checks per protocol. Neuro checks per icu protocol. Peripheral pulse checks per protocol. ICU management per NSICU team.   SBP goal 100-140  Resume aspirin and plavix starting tomorrow.   Upon discharge follow up with Dr. Court Livingston in 2 weeks and Dr. Brittany Harvey in 3 months with  MRA head with and without contrast.      MD Hilary Dale MD   Pager [phone]  Stroke, [facility]  [id number] [place]  Electronically signed 1/25/2021 at 12:41 PM

## 2021-01-25 NOTE — ANESTHESIA POSTPROCEDURE EVALUATION
Department of Anesthesiology  Postprocedure Note    Patient: Eliceo Ochoa  MRN: [id number]  [place]: 1980  Date of evaluation: 1/25/2021  Time:  2:38 PM     Procedure Summary     Date: 01/25/21 Room / Location: [facility] Special Procedures    Anesthesia Start: 1032 Anesthesia Stop: 9421    Procedure: IR ANGIOGRAM CAROTID CEREBRAL BILATERAL Diagnosis:       Cerebral aneurysm      Aneurysm [address] [address]      Cerebral aneurysm rupture ([facility] [address]      Injury of left internal carotid artery, intracranial portion, not elsewhere classified with loss of consciousness of 31 minutes to 59 minutes, subsequent encounter    Scheduled Providers: RUDOLPH La - CRNA Responsible Provider: Twan Mora MD    Anesthesia Type: general, MAC ASA Status: 2          Anesthesia Type: general, MAC    Nahomi Phase I: Nahomi Score: 7    Nahomi Phase II:      Last vitals: Reviewed and per EMR flowsheets.        Anesthesia Post Evaluation    Patient location during evaluation: PACU  Patient participation: complete - patient participated  Level of consciousness: awake and alert  Pain score: 2  Airway patency: patent  Nausea & Vomiting: no nausea and no vomiting  Complications: no  Cardiovascular status: hemodynamically stable  Respiratory status: acceptable  Hydration status: euvolemic

## 2021-01-26 ENCOUNTER — ANESTHESIA EVENT (OUTPATIENT)
Dept: INTERVENTIONAL RADIOLOGY/VASCULAR | Age: 41
DRG: 025 | End: 2021-01-26
Payer: COMMERCIAL

## 2021-01-26 ENCOUNTER — APPOINTMENT (OUTPATIENT)
Dept: MRI IMAGING | Age: 41
DRG: 025 | End: 2021-01-26
Attending: PSYCHIATRY & NEUROLOGY
Payer: COMMERCIAL

## 2021-01-26 ENCOUNTER — APPOINTMENT (OUTPATIENT)
Dept: INTERVENTIONAL RADIOLOGY/VASCULAR | Age: 41
DRG: 025 | End: 2021-01-26
Attending: PSYCHIATRY & NEUROLOGY
Payer: COMMERCIAL

## 2021-01-26 ENCOUNTER — APPOINTMENT (OUTPATIENT)
Dept: CT IMAGING | Age: 41
DRG: 025 | End: 2021-01-26
Attending: PSYCHIATRY & NEUROLOGY
Payer: COMMERCIAL

## 2021-01-26 ENCOUNTER — ANESTHESIA (OUTPATIENT)
Dept: INTERVENTIONAL RADIOLOGY/VASCULAR | Age: 41
DRG: 025 | End: 2021-01-26
Payer: COMMERCIAL

## 2021-01-26 VITALS — DIASTOLIC BLOOD PRESSURE: 84 MMHG | SYSTOLIC BLOOD PRESSURE: 119 MMHG | OXYGEN SATURATION: 100 %

## 2021-01-26 LAB
ABSOLUTE EOS #: <0.03 K/UL (ref 0–0.44)
ABSOLUTE IMMATURE GRANULOCYTE: 0.06 K/UL (ref 0–0.3)
ABSOLUTE LYMPH #: 1.01 K/UL (ref 1.1–3.7)
ABSOLUTE MONO #: 0.25 K/UL (ref 0.1–1.2)
ALBUMIN SERPL-MCNC: 3.1 G/DL (ref 3.5–5.2)
ALBUMIN/GLOBULIN RATIO: 0.9 (ref 1–2.5)
ALP BLD-CCNC: 45 U/L (ref 35–104)
ALT SERPL-CCNC: 6 U/L (ref 5–33)
ANION GAP SERPL CALCULATED.3IONS-SCNC: 13 MMOL/L (ref 9–17)
AST SERPL-CCNC: 9 U/L
BASOPHILS # BLD: 0 % (ref 0–2)
BASOPHILS ABSOLUTE: <0.03 K/UL (ref 0–0.2)
BILIRUB SERPL-MCNC: 0.18 MG/DL (ref 0.3–1.2)
BUN BLDV-MCNC: 6 MG/DL (ref 6–20)
BUN/CREAT BLD: ABNORMAL (ref 9–20)
CALCIUM SERPL-MCNC: 8.4 MG/DL (ref 8.6–10.4)
CHLORIDE BLD-SCNC: 108 MMOL/L (ref 98–107)
CO2: 16 MMOL/L (ref 20–31)
CREAT SERPL-MCNC: 0.68 MG/DL (ref 0.5–0.9)
CULTURE: NO GROWTH
DIFFERENTIAL TYPE: ABNORMAL
EKG ATRIAL RATE: 60 BPM
EKG P AXIS: 41 DEGREES
EKG P-R INTERVAL: 142 MS
EKG Q-T INTERVAL: 446 MS
EKG QRS DURATION: 90 MS
EKG QTC CALCULATION (BAZETT): 446 MS
EKG R AXIS: 22 DEGREES
EKG T AXIS: 40 DEGREES
EKG VENTRICULAR RATE: 60 BPM
EOSINOPHILS RELATIVE PERCENT: 0 % (ref 1–4)
GFR AFRICAN AMERICAN: >60 ML/MIN
GFR NON-AFRICAN AMERICAN: >60 ML/MIN
GFR SERPL CREATININE-BSD FRML MDRD: ABNORMAL ML/MIN/{1.73_M2}
GFR SERPL CREATININE-BSD FRML MDRD: ABNORMAL ML/MIN/{1.73_M2}
GLUCOSE BLD-MCNC: 134 MG/DL (ref 70–99)
HCT VFR BLD CALC: 35.7 % (ref 36.3–47.1)
HEMOGLOBIN: 11.7 G/DL (ref 11.9–15.1)
IMMATURE GRANULOCYTES: 0 %
LYMPHOCYTES # BLD: 7 % (ref 24–43)
Lab: NORMAL
MCH RBC QN AUTO: 29 PG (ref 25.2–33.5)
MCHC RBC AUTO-ENTMCNC: 32.8 G/DL (ref 28.4–34.8)
MCV RBC AUTO: 88.4 FL (ref 82.6–102.9)
MONOCYTES # BLD: 2 % (ref 3–12)
NRBC AUTOMATED: 0 PER 100 WBC
PDW BLD-RTO: 12.7 % (ref 11.8–14.4)
PLATELET # BLD: 307 K/UL (ref 138–453)
PLATELET ESTIMATE: ABNORMAL
PMV BLD AUTO: 10.4 FL (ref 8.1–13.5)
POTASSIUM SERPL-SCNC: 4.2 MMOL/L (ref 3.7–5.3)
RBC # BLD: 4.04 M/UL (ref 3.95–5.11)
RBC # BLD: ABNORMAL 10*6/UL
SEG NEUTROPHILS: 91 % (ref 36–65)
SEGMENTED NEUTROPHILS ABSOLUTE COUNT: 12.77 K/UL (ref 1.5–8.1)
SODIUM BLD-SCNC: 137 MMOL/L (ref 135–144)
SPECIMEN DESCRIPTION: NORMAL
TOTAL PROTEIN: 6.6 G/DL (ref 6.4–8.3)
WBC # BLD: 14.1 K/UL (ref 3.5–11.3)
WBC # BLD: ABNORMAL 10*3/UL

## 2021-01-26 PROCEDURE — 85025 COMPLETE CBC W/AUTO DIFF WBC: CPT

## 2021-01-26 PROCEDURE — 03CG3ZZ EXTIRPATION OF MATTER FROM INTRACRANIAL ARTERY, PERCUTANEOUS APPROACH: ICD-10-PCS | Performed by: PSYCHIATRY & NEUROLOGY

## 2021-01-26 PROCEDURE — 6370000000 HC RX 637 (ALT 250 FOR IP): Performed by: STUDENT IN AN ORGANIZED HEALTH CARE EDUCATION/TRAINING PROGRAM

## 2021-01-26 PROCEDURE — C1874 STENT, COATED/COV W/DEL SYS: HCPCS

## 2021-01-26 PROCEDURE — 99233 SBSQ HOSP IP/OBS HIGH 50: CPT | Performed by: PSYCHIATRY & NEUROLOGY

## 2021-01-26 PROCEDURE — 6360000002 HC RX W HCPCS: Performed by: PSYCHIATRY & NEUROLOGY

## 2021-01-26 PROCEDURE — C1894 INTRO/SHEATH, NON-LASER: HCPCS

## 2021-01-26 PROCEDURE — 93010 ELECTROCARDIOGRAM REPORT: CPT | Performed by: INTERNAL MEDICINE

## 2021-01-26 PROCEDURE — A9579 GAD-BASE MR CONTRAST NOS,1ML: HCPCS | Performed by: NURSE PRACTITIONER

## 2021-01-26 PROCEDURE — 2580000003 HC RX 258: Performed by: NURSE PRACTITIONER

## 2021-01-26 PROCEDURE — C1887 CATHETER, GUIDING: HCPCS

## 2021-01-26 PROCEDURE — 6360000002 HC RX W HCPCS: Performed by: STUDENT IN AN ORGANIZED HEALTH CARE EDUCATION/TRAINING PROGRAM

## 2021-01-26 PROCEDURE — 70549 MR ANGIOGRAPH NECK W/O&W/DYE: CPT

## 2021-01-26 PROCEDURE — 2580000003 HC RX 258: Performed by: NURSE ANESTHETIST, CERTIFIED REGISTERED

## 2021-01-26 PROCEDURE — 6360000002 HC RX W HCPCS: Performed by: NURSE PRACTITIONER

## 2021-01-26 PROCEDURE — C1769 GUIDE WIRE: HCPCS

## 2021-01-26 PROCEDURE — 3700000001 HC ADD 15 MINUTES (ANESTHESIA)

## 2021-01-26 PROCEDURE — 2580000003 HC RX 258: Performed by: PSYCHIATRY & NEUROLOGY

## 2021-01-26 PROCEDURE — 3700000000 HC ANESTHESIA ATTENDED CARE

## 2021-01-26 PROCEDURE — 6360000002 HC RX W HCPCS: Performed by: NURSE ANESTHETIST, CERTIFIED REGISTERED

## 2021-01-26 PROCEDURE — 70546 MR ANGIOGRAPH HEAD W/O&W/DYE: CPT

## 2021-01-26 PROCEDURE — 6360000004 HC RX CONTRAST MEDICATION: Performed by: NURSE PRACTITIONER

## 2021-01-26 PROCEDURE — C1725 CATH, TRANSLUMIN NON-LASER: HCPCS

## 2021-01-26 PROCEDURE — 2580000003 HC RX 258: Performed by: STUDENT IN AN ORGANIZED HEALTH CARE EDUCATION/TRAINING PROGRAM

## 2021-01-26 PROCEDURE — C1757 CATH, THROMBECTOMY/EMBOLECT: HCPCS

## 2021-01-26 PROCEDURE — 6360000004 HC RX CONTRAST MEDICATION: Performed by: NURSE ANESTHETIST, CERTIFIED REGISTERED

## 2021-01-26 PROCEDURE — 2500000003 HC RX 250 WO HCPCS: Performed by: NURSE ANESTHETIST, CERTIFIED REGISTERED

## 2021-01-26 PROCEDURE — 037G3DZ DILATION OF INTRACRANIAL ARTERY WITH INTRALUMINAL DEVICE, PERCUTANEOUS APPROACH: ICD-10-PCS | Performed by: PSYCHIATRY & NEUROLOGY

## 2021-01-26 PROCEDURE — 2709999900 HC NON-CHARGEABLE SUPPLY

## 2021-01-26 PROCEDURE — 36415 COLL VENOUS BLD VENIPUNCTURE: CPT

## 2021-01-26 PROCEDURE — 70450 CT HEAD/BRAIN W/O DYE: CPT

## 2021-01-26 PROCEDURE — 6370000000 HC RX 637 (ALT 250 FOR IP): Performed by: PSYCHIATRY & NEUROLOGY

## 2021-01-26 PROCEDURE — 2000000003 HC NEURO ICU R&B

## 2021-01-26 PROCEDURE — 93005 ELECTROCARDIOGRAM TRACING: CPT | Performed by: STUDENT IN AN ORGANIZED HEALTH CARE EDUCATION/TRAINING PROGRAM

## 2021-01-26 PROCEDURE — C9460 INJECTION, CANGRELOR: HCPCS | Performed by: PSYCHIATRY & NEUROLOGY

## 2021-01-26 PROCEDURE — 99223 1ST HOSP IP/OBS HIGH 75: CPT | Performed by: PSYCHIATRY & NEUROLOGY

## 2021-01-26 PROCEDURE — 61645 PERQ ART M-THROMBECT &/NFS: CPT | Performed by: PSYCHIATRY & NEUROLOGY

## 2021-01-26 PROCEDURE — 80053 COMPREHEN METABOLIC PANEL: CPT

## 2021-01-26 PROCEDURE — 70551 MRI BRAIN STEM W/O DYE: CPT

## 2021-01-26 RX ORDER — GLYCOPYRROLATE 1 MG/5 ML
SYRINGE (ML) INTRAVENOUS PRN
Status: DISCONTINUED | OUTPATIENT
Start: 2021-01-26 | End: 2021-01-26 | Stop reason: SDUPTHER

## 2021-01-26 RX ORDER — ASPIRIN 81 MG/1
81 TABLET ORAL DAILY
Status: DISCONTINUED | OUTPATIENT
Start: 2021-01-27 | End: 2021-01-28 | Stop reason: HOSPADM

## 2021-01-26 RX ORDER — CLINDAMYCIN PHOSPHATE 600 MG/50ML
INJECTION INTRAVENOUS PRN
Status: DISCONTINUED | OUTPATIENT
Start: 2021-01-26 | End: 2021-01-26 | Stop reason: SDUPTHER

## 2021-01-26 RX ORDER — ONDANSETRON 2 MG/ML
4 INJECTION INTRAMUSCULAR; INTRAVENOUS EVERY 6 HOURS PRN
Status: DISCONTINUED | OUTPATIENT
Start: 2021-01-26 | End: 2021-01-28 | Stop reason: HOSPADM

## 2021-01-26 RX ORDER — SODIUM CHLORIDE 0.9 % (FLUSH) 0.9 %
10 SYRINGE (ML) INJECTION PRN
Status: DISCONTINUED | OUTPATIENT
Start: 2021-01-26 | End: 2021-01-28 | Stop reason: HOSPADM

## 2021-01-26 RX ORDER — IODIXANOL 270 MG/ML
200 INJECTION, SOLUTION INTRAVASCULAR
Status: COMPLETED | OUTPATIENT
Start: 2021-01-26 | End: 2021-01-26

## 2021-01-26 RX ORDER — PROPOFOL 10 MG/ML
INJECTION, EMULSION INTRAVENOUS PRN
Status: DISCONTINUED | OUTPATIENT
Start: 2021-01-26 | End: 2021-01-26 | Stop reason: SDUPTHER

## 2021-01-26 RX ORDER — LABETALOL HYDROCHLORIDE 5 MG/ML
10 INJECTION, SOLUTION INTRAVENOUS
Status: DISCONTINUED | OUTPATIENT
Start: 2021-01-26 | End: 2021-01-28 | Stop reason: HOSPADM

## 2021-01-26 RX ORDER — HEPARIN SODIUM 1000 [USP'U]/ML
INJECTION, SOLUTION INTRAVENOUS; SUBCUTANEOUS PRN
Status: DISCONTINUED | OUTPATIENT
Start: 2021-01-26 | End: 2021-01-26 | Stop reason: SDUPTHER

## 2021-01-26 RX ORDER — MAGNESIUM SULFATE IN WATER 40 MG/ML
2000 INJECTION, SOLUTION INTRAVENOUS ONCE
Status: COMPLETED | OUTPATIENT
Start: 2021-01-26 | End: 2021-01-26

## 2021-01-26 RX ORDER — METOCLOPRAMIDE HYDROCHLORIDE 5 MG/ML
10 INJECTION INTRAMUSCULAR; INTRAVENOUS ONCE
Status: COMPLETED | OUTPATIENT
Start: 2021-01-26 | End: 2021-01-26

## 2021-01-26 RX ORDER — DEXMEDETOMIDINE HYDROCHLORIDE 4 UG/ML
INJECTION, SOLUTION INTRAVENOUS CONTINUOUS PRN
Status: DISCONTINUED | OUTPATIENT
Start: 2021-01-26 | End: 2021-01-26 | Stop reason: SDUPTHER

## 2021-01-26 RX ORDER — 0.9 % SODIUM CHLORIDE 0.9 %
500 INTRAVENOUS SOLUTION INTRAVENOUS ONCE
Status: COMPLETED | OUTPATIENT
Start: 2021-01-26 | End: 2021-01-26

## 2021-01-26 RX ORDER — PHENYLEPHRINE HCL IN 0.9% NACL 1 MG/10 ML
SYRINGE (ML) INTRAVENOUS PRN
Status: DISCONTINUED | OUTPATIENT
Start: 2021-01-26 | End: 2021-01-26 | Stop reason: SDUPTHER

## 2021-01-26 RX ORDER — ACETAMINOPHEN 325 MG/1
650 TABLET ORAL EVERY 4 HOURS PRN
Status: DISCONTINUED | OUTPATIENT
Start: 2021-01-26 | End: 2021-01-28 | Stop reason: HOSPADM

## 2021-01-26 RX ORDER — MIDAZOLAM HYDROCHLORIDE 1 MG/ML
INJECTION INTRAMUSCULAR; INTRAVENOUS PRN
Status: DISCONTINUED | OUTPATIENT
Start: 2021-01-26 | End: 2021-01-26 | Stop reason: SDUPTHER

## 2021-01-26 RX ORDER — SODIUM CHLORIDE, SODIUM LACTATE, POTASSIUM CHLORIDE, CALCIUM CHLORIDE 600; 310; 30; 20 MG/100ML; MG/100ML; MG/100ML; MG/100ML
INJECTION, SOLUTION INTRAVENOUS CONTINUOUS PRN
Status: DISCONTINUED | OUTPATIENT
Start: 2021-01-26 | End: 2021-01-26 | Stop reason: SDUPTHER

## 2021-01-26 RX ORDER — PROMETHAZINE HYDROCHLORIDE 12.5 MG/1
12.5 TABLET ORAL EVERY 6 HOURS PRN
Status: DISCONTINUED | OUTPATIENT
Start: 2021-01-26 | End: 2021-01-28 | Stop reason: HOSPADM

## 2021-01-26 RX ORDER — FENTANYL CITRATE 50 UG/ML
INJECTION, SOLUTION INTRAMUSCULAR; INTRAVENOUS PRN
Status: DISCONTINUED | OUTPATIENT
Start: 2021-01-26 | End: 2021-01-26 | Stop reason: SDUPTHER

## 2021-01-26 RX ORDER — KETOROLAC TROMETHAMINE 30 MG/ML
30 INJECTION, SOLUTION INTRAMUSCULAR; INTRAVENOUS ONCE
Status: COMPLETED | OUTPATIENT
Start: 2021-01-26 | End: 2021-01-26

## 2021-01-26 RX ADMIN — ASPIRIN 325 MG: 325 TABLET, COATED ORAL at 09:27

## 2021-01-26 RX ADMIN — FENTANYL CITRATE 25 MCG: 50 INJECTION, SOLUTION INTRAMUSCULAR; INTRAVENOUS at 16:03

## 2021-01-26 RX ADMIN — FENTANYL CITRATE 25 MCG: 50 INJECTION, SOLUTION INTRAMUSCULAR; INTRAVENOUS at 18:06

## 2021-01-26 RX ADMIN — Medication 0.2 MG: at 17:19

## 2021-01-26 RX ADMIN — SODIUM CHLORIDE, POTASSIUM CHLORIDE, SODIUM LACTATE AND CALCIUM CHLORIDE: 600; 310; 30; 20 INJECTION, SOLUTION INTRAVENOUS at 16:07

## 2021-01-26 RX ADMIN — Medication 100 MCG: at 17:16

## 2021-01-26 RX ADMIN — PROPOFOL 20 MG: 10 INJECTION, EMULSION INTRAVENOUS at 18:24

## 2021-01-26 RX ADMIN — FENTANYL CITRATE 25 MCG: 50 INJECTION, SOLUTION INTRAMUSCULAR; INTRAVENOUS at 17:37

## 2021-01-26 RX ADMIN — DEXMEDETOMIDINE HYDROCHLORIDE 0.5 MCG/KG/HR: 400 INJECTION INTRAVENOUS at 18:31

## 2021-01-26 RX ADMIN — CANGRELOR 2 MCG/KG/MIN: 50 INJECTION, POWDER, LYOPHILIZED, FOR SOLUTION INTRAVENOUS at 18:04

## 2021-01-26 RX ADMIN — FENTANYL CITRATE 50 MCG: 50 INJECTION, SOLUTION INTRAMUSCULAR; INTRAVENOUS at 16:14

## 2021-01-26 RX ADMIN — METOCLOPRAMIDE 10 MG: 5 INJECTION, SOLUTION INTRAMUSCULAR; INTRAVENOUS at 09:26

## 2021-01-26 RX ADMIN — FENTANYL CITRATE 25 MCG: 50 INJECTION, SOLUTION INTRAMUSCULAR; INTRAVENOUS at 16:53

## 2021-01-26 RX ADMIN — FENTANYL CITRATE 25 MCG: 50 INJECTION, SOLUTION INTRAMUSCULAR; INTRAVENOUS at 16:28

## 2021-01-26 RX ADMIN — SODIUM CHLORIDE, POTASSIUM CHLORIDE, SODIUM LACTATE AND CALCIUM CHLORIDE: 600; 310; 30; 20 INJECTION, SOLUTION INTRAVENOUS at 17:08

## 2021-01-26 RX ADMIN — ONDANSETRON 4 MG: 2 INJECTION INTRAMUSCULAR; INTRAVENOUS at 20:30

## 2021-01-26 RX ADMIN — CLOPIDOGREL 75 MG: 75 TABLET, FILM COATED ORAL at 09:27

## 2021-01-26 RX ADMIN — FENTANYL CITRATE 25 MCG: 50 INJECTION, SOLUTION INTRAMUSCULAR; INTRAVENOUS at 16:50

## 2021-01-26 RX ADMIN — PROPOFOL 20 MG: 10 INJECTION, EMULSION INTRAVENOUS at 18:11

## 2021-01-26 RX ADMIN — SODIUM CHLORIDE, PRESERVATIVE FREE 10 ML: 5 INJECTION INTRAVENOUS at 09:27

## 2021-01-26 RX ADMIN — FENTANYL CITRATE 25 MCG: 50 INJECTION, SOLUTION INTRAMUSCULAR; INTRAVENOUS at 17:56

## 2021-01-26 RX ADMIN — KETOROLAC TROMETHAMINE 30 MG: 30 INJECTION, SOLUTION INTRAMUSCULAR at 09:54

## 2021-01-26 RX ADMIN — FENTANYL CITRATE 25 MCG: 50 INJECTION, SOLUTION INTRAMUSCULAR; INTRAVENOUS at 16:05

## 2021-01-26 RX ADMIN — SODIUM CHLORIDE, PRESERVATIVE FREE 10 ML: 5 INJECTION INTRAVENOUS at 20:19

## 2021-01-26 RX ADMIN — IODIXANOL 140 ML: 270 INJECTION, SOLUTION INTRAVASCULAR at 19:53

## 2021-01-26 RX ADMIN — DEXAMETHASONE SODIUM PHOSPHATE 4 MG: 4 INJECTION, SOLUTION INTRAMUSCULAR; INTRAVENOUS at 11:06

## 2021-01-26 RX ADMIN — TICAGRELOR 180 MG: 90 TABLET ORAL at 20:20

## 2021-01-26 RX ADMIN — MAGNESIUM SULFATE 2000 MG: 2 INJECTION INTRAVENOUS at 09:54

## 2021-01-26 RX ADMIN — HEPARIN SODIUM 2000 UNITS: 1000 INJECTION INTRAVENOUS; SUBCUTANEOUS at 17:00

## 2021-01-26 RX ADMIN — Medication 100 MCG: at 17:46

## 2021-01-26 RX ADMIN — DEXAMETHASONE SODIUM PHOSPHATE 4 MG: 4 INJECTION, SOLUTION INTRAMUSCULAR; INTRAVENOUS at 20:19

## 2021-01-26 RX ADMIN — GADOTERIDOL 15 ML: 279.3 INJECTION, SOLUTION INTRAVENOUS at 15:25

## 2021-01-26 RX ADMIN — PROPOFOL 20 MG: 10 INJECTION, EMULSION INTRAVENOUS at 18:38

## 2021-01-26 RX ADMIN — MIDAZOLAM HYDROCHLORIDE 1 MG: 1 INJECTION, SOLUTION INTRAMUSCULAR; INTRAVENOUS at 16:05

## 2021-01-26 RX ADMIN — MIDAZOLAM HYDROCHLORIDE 1 MG: 1 INJECTION, SOLUTION INTRAMUSCULAR; INTRAVENOUS at 16:35

## 2021-01-26 RX ADMIN — DOCUSATE SODIUM 50MG AND SENNOSIDES 8.6MG 1 TABLET: 8.6; 5 TABLET, FILM COATED ORAL at 09:26

## 2021-01-26 RX ADMIN — CLINDAMYCIN IN 5 PERCENT DEXTROSE 600 MG: 12 INJECTION, SOLUTION INTRAVENOUS at 16:24

## 2021-01-26 RX ADMIN — FENTANYL CITRATE 25 MCG: 50 INJECTION, SOLUTION INTRAMUSCULAR; INTRAVENOUS at 18:01

## 2021-01-26 RX ADMIN — MIDAZOLAM HYDROCHLORIDE 1 MG: 1 INJECTION, SOLUTION INTRAMUSCULAR; INTRAVENOUS at 16:03

## 2021-01-26 RX ADMIN — ACETAMINOPHEN 650 MG: 325 TABLET ORAL at 08:16

## 2021-01-26 RX ADMIN — PROPOFOL 20 MG: 10 INJECTION, EMULSION INTRAVENOUS at 18:15

## 2021-01-26 RX ADMIN — FENTANYL CITRATE 25 MCG: 50 INJECTION, SOLUTION INTRAMUSCULAR; INTRAVENOUS at 16:55

## 2021-01-26 RX ADMIN — Medication 100 MCG: at 17:05

## 2021-01-26 RX ADMIN — DEXAMETHASONE SODIUM PHOSPHATE 4 MG: 4 INJECTION, SOLUTION INTRAMUSCULAR; INTRAVENOUS at 02:44

## 2021-01-26 RX ADMIN — MIDAZOLAM HYDROCHLORIDE 1 MG: 1 INJECTION, SOLUTION INTRAMUSCULAR; INTRAVENOUS at 17:23

## 2021-01-26 RX ADMIN — SODIUM CHLORIDE 500 ML: 9 INJECTION, SOLUTION INTRAVENOUS at 09:27

## 2021-01-26 RX ADMIN — PROPOFOL 20 MG: 10 INJECTION, EMULSION INTRAVENOUS at 18:47

## 2021-01-26 ASSESSMENT — PULMONARY FUNCTION TESTS
PIF_VALUE: 0
PIF_VALUE: 1
PIF_VALUE: 0
PIF_VALUE: 1
PIF_VALUE: 0
PIF_VALUE: 0
PIF_VALUE: 1
PIF_VALUE: 0
PIF_VALUE: 1
PIF_VALUE: 0
PIF_VALUE: 0
PIF_VALUE: 1
PIF_VALUE: 0
PIF_VALUE: 1
PIF_VALUE: 0
PIF_VALUE: 1
PIF_VALUE: 0
PIF_VALUE: 1
PIF_VALUE: 0
PIF_VALUE: 1
PIF_VALUE: 1
PIF_VALUE: 0
PIF_VALUE: 0
PIF_VALUE: 1
PIF_VALUE: 0
PIF_VALUE: 1
PIF_VALUE: 0
PIF_VALUE: 1
PIF_VALUE: 0
PIF_VALUE: 1
PIF_VALUE: 0
PIF_VALUE: 0

## 2021-01-26 ASSESSMENT — PAIN DESCRIPTION - PAIN TYPE: TYPE: CHRONIC PAIN

## 2021-01-26 ASSESSMENT — PAIN SCALES - GENERAL
PAINLEVEL_OUTOF10: 4
PAINLEVEL_OUTOF10: 7

## 2021-01-26 NOTE — ANESTHESIA PRE PROCEDURE
Department of Anesthesiology  Preprocedure Note       Name:  Chip Reina   Age:  38 y.o.  :  1980                                          MRN:  [id number]         Date:  2021      Surgeon: * No surgeons listed *    Procedure: * No procedures listed *    Medications prior to admission:   Prior to Admission medications    Medication Sig Start Date End Date Taking? Authorizing Provider   clopidogrel (PLAVIX) 75 MG tablet Take 1 tablet by mouth daily 21  Yes Mandie Rodriguez MD   aspirin  MG EC tablet Take 1 tablet by mouth daily 21  Yes Mandie Rodriguez MD   ibuprofen (ADVIL;MOTRIN) 800 MG tablet Take 1 tablet by mouth every 6 hours as needed for Pain 21  Yes RUDOLPH Dye CNP   norelgestromin-ethinyl estradiol (ORTHO EVRA) 150-35 MCG/24HR Place 1 patch onto the skin once a week Place continuously, and after 3 months of continuous patches take a break for a period. 20  Yes Yao Arias MD   Ubrogepant 100 MG TABS Take one at onset of migraine. May repeat in 2 hours.   No more than 2 in 24 hours and 4 in 1 week 21   RUDOLPH Dye CNP   Galcanezumab-gnlm [facility]) 120 MG/ML SOAJ Inject 120 mg into the skin every 30 days 20   RUDOLPH Dye CNP       Current medications:    Current Facility-Administered Medications   Medication Dose Route Frequency Provider Last Rate Last Admin    sodium chloride flush 0.9 % injection 10 mL  10 mL Intravenous PRN RUDOLPH Espinosa CNP        aspirin EC tablet 325 mg  325 mg Oral Daily Mackenzie , DO   325 mg at 21 6936    clopidogrel (PLAVIX) tablet 75 mg  75 mg Oral Daily Mackenzie [place], DO   75 mg at 21 6034    sodium chloride flush 0.9 % injection 10 mL  10 mL Intravenous 2 times per day Mackenzie , DO   10 mL at 21 0405    sodium chloride flush 0.9 % injection 10 mL  10 mL Intravenous PRN Mackenzie , DO  promethazine (PHENERGAN) tablet 12.5 mg  12.5 mg Oral Q6H PRN Chloé Floor, DO        Or    [facility] injection 4 mg  4 mg Intravenous Q6H PRN Chloé Floor, DO   4 mg at 01/25/21 1513    polyethylene glycol (GLYCOLAX) packet 17 g  17 g Oral Daily PRN Chloé Floor, DO        acetaminophen (TYLENOL) tablet 650 mg  650 mg Oral Q6H PRN Chloé Floor, DO   650 mg at 01/26/21 0193    Or    acetaminophen (TYLENOL) suppository 650 mg  650 mg Rectal Q6H PRN Chloé Floor, DO        sennosides-docusate sodium (SENOKOT-S) 8.6-50 MG tablet 1 tablet  1 tablet Oral BID Chloé Floor, DO   1 tablet at 01/26/21 0926    dexamethasone (DECADRON) injection 4 mg  4 mg Intravenous Q8H Karie Hylton MD   4 mg at 01/26/21 1106    0.9 % sodium chloride infusion   Intravenous Continuous Karie Hylton  mL/hr at 01/25/21 1943 New Bag at 01/25/21 1943    labetalol (NORMODYNE;TRANDATE) injection 10 mg  10 mg Intravenous Q10 Min PRN Chloé Floor, DO           Allergies: Allergies   Allergen Reactions    Amoxicillin Swelling     lips    Penicillins Other (See Comments)     Pt. Unsure of reaction       Problem List:    Patient Active Problem List   Diagnosis Code    Dysmenorrhea N94.6    Pelvic pain R10.2    Intractable migraine without aura and without status migrainosus G43.019    Aneurysm ([address] I72.9    Cerebral aneurysm rupture (HCC) I60.7    Cerebral aneurysm I67.1       Past Medical History:        Diagnosis Date    Carotid aneurysm, left ([address]     Migraines     Wellness examination     PCP Kayli Welsh MD/jesusita/ last seen 6-2020       Past Surgical History:        Procedure Laterality Date    CARDIAC CATHETERIZATION  01/25/2021    IR ANGIOGRAM CAROTID CEREBRAL BILATERAL BRANDY SURPASS EVOLVE ANEURYSM EMBOLIZATION SYSTEM MRI CONDITIONAL 3T OK. SAFE IMMEDIATELY POST IMPLANT.     CARDIAC CATHETERIZATION      IR ANGIOGRAM CAROTID CEREBRAL BILATERAL POCK 4.0  --    POCCL 106  --        Coags: No results found for: PROTIME, INR, APTT    HCG (If Applicable):   Lab Results   Component Value Date    PREGTESTUR NEGATIVE 11/19/2016    HCG NEGATIVE 01/25/2021        ABGs: No results found for: PHART, PO2ART, DJD7PMM, KNP3VHJ, BEART, W0EULXCK     Type & Screen (If Applicable):  No results found for: LABABO, LABRH    Drug/Infectious Status (If Applicable):  No results found for: HIV, HEPCAB    COVID-19 Screening (If Applicable):   Lab Results   Component Value Date    COVID19 Not Detected 01/21/2021         Anesthesia Evaluation  Patient summary reviewed no history of anesthetic complications:   Airway: Mallampati: III  TM distance: >3 FB   Neck ROM: full  Mouth opening: > = 3 FB Dental:          Pulmonary:Negative Pulmonary ROS and normal exam                               Cardiovascular:Negative CV ROS            Rhythm: regular  Rate: normal                    Neuro/Psych:   (+) headaches: migraine headaches,             GI/Hepatic/Renal: Neg GI/Hepatic/Renal ROS            Endo/Other: Negative Endo/Other ROS                    Abdominal:   (+) obese,         Vascular: negative vascular ROS. Anesthesia Plan      MAC     ASA 3 - emergent       Induction: intravenous. Anesthetic plan and risks discussed with patient. Use of blood products discussed with patient whom consented to blood products. Plan discussed with CRNA.                   Tong Yi MD   1/26/2021

## 2021-01-26 NOTE — PROGRESS NOTES
Daily Progress Note  Neuro Critical Care    Patient Name: Arlen Mckinley  Patient : 1980  Room/Bed: 30/0530-01  Code Status: FULL  Allergies: Allergies   Allergen Reactions    Amoxicillin Swelling     lips    Penicillins Other (See Comments)     Pt. Unsure of reaction       CHIEF COMPLAINT:     Elective aneurysm coiling      INTERVAL HISTORY    Initial Presentation (Admitted 2021): This is a 44-year-old female with a history of migraines who presented for elective aneurysm coiling. Patient follows with general neurology for headaches and was noted to have worsening headache behind her left eye. She had a CTA head and neck done which revealed a 5 mm left paraophthalmic ICA aneurysm. She was evaluated by endovascular neurosurgery and recommended coiling. Patient was placed on dual antiplatelet therapy of aspirin 325 mg and Plavix 75 mg daily. Hospital Course:   Patient was taken to the angio suite for cerebral angiogram.  Noted to have a left superior hypophyseal aneurysm. This was treated using a flow diverter. Last 24h:   Postprocedure, patient had concern of right upper extremity/hand weakness. Started on Decadron 4 mg every 8. no acute concerns overnight. This morning patient still had complaint of a headache and was more lethargic.     CURRENT MEDICATIONS:  SCHEDULED MEDICATIONS:   aspirin  325 mg Oral Daily    clopidogrel  75 mg Oral Daily    sodium chloride flush  10 mL Intravenous 2 times per day    sennosides-docusate sodium  1 tablet Oral BID    dexamethasone  4 mg Intravenous Q8H     CONTINUOUS INFUSIONS:   sodium chloride 100 mL/hr at 21     PRN MEDICATIONS:   sodium chloride flush, sodium chloride flush, promethazine **OR** ondansetron, polyethylene glycol, acetaminophen **OR** acetaminophen, labetalol    VITALS:  Temperature Range: Temp: 98.2 °F (36.8 °C) Temp  Av.2 °F (36.8 °C)  Min: 97.9 °F (36.6 °C)  Max: 98.5 °F (36.9 °C)  BP Range: Systolic (24hrs), Av , Min:122 , QOF:007     Diastolic (61LLI), QJV:46, Min:62, Max:92    Pulse Range: Pulse  Av.5  Min: 56  Max: 73  Respiration Range: Resp  Avg: 15.3  Min: 0  Max: 19  Current Pulse Ox: SpO2: 97 %  24HR Pulse Ox Range: SpO2  Av.8 %  Min: 97 %  Max: 99 %  Patient Vitals for the past 12 hrs:   BP Temp Temp src Pulse Resp SpO2   21 1400 (!) 145/92   72 18 97 %   21 1300    67 17 98 %   21 1200 138/74 98.2 °F (36.8 °C) Oral 58 18 98 %   21 1100 138/74   58 17    21 1000 131/79   56 19 99 %   21 0900 134/80   58 18 99 %   21 0800 122/72 98.4 °F (36.9 °C) Oral 65 17 99 %   21 0702 132/66   72 17 97 %   21 0700    73 17 97 %   21 0600 126/77   63 16 97 %   21 0500 138/80   69 18 98 %   21 0400 (!) 149/84 97.9 °F (36.6 °C) Oral 58 18 98 %     Estimated body mass index is 34.75 kg/m² as calculated from the following:    Height as of this encounter: 5' 2\" (1.575 m).     Weight as of this encounter: 190 lb (86.2 kg).  []<16 Severe malnutrition  []1616.99 Moderate malnutrition  []1718.49 Mild malnutrition  []18.524.9 Normal  []2529.9 Overweight (not obese)  [x]3034.9 Obese class 1 (Low Risk)  []3539.9 Obese class 2 (Moderate Risk)  []?40 Obese class 3 (High Risk)    RECENT LABS:   Lab Results   Component Value Date    WBC 14.1 (H) 2021    HGB 11.7 (L) 2021    HCT 35.7 (L) 2021     2021    CHOL 201 (H) 2018    TRIG 50 2018    HDL 94 2018    ALT 6 2021    AST 9 2021     2021    K 4.2 2021     (H) 2021    CREATININE 0.68 2021    BUN 6 2021    CO2 16 (L) 2021    TSH 1.77 2018    LABA1C 5.5 2012     24 HOUR INTAKE/OUTPUT:    Intake/Output Summary (Last 24 hours) at 2021 1530  Last data filed at 2021 1200  Gross per 24 hour   Intake 1340 ml   Output 1005 ml   Net 335 ml       IMAGING: CT Head 1/26/2021  Impression   No acute intracranial abnormality.         Labs and Images reviewed with:  [] Dr. Uday Mullins. Terence    [x] Dr. Judy Snyder  [] Dr. Radha Avina  [] There are no new interval images to review. PHYSICAL EXAM       CONSTITUTIONAL:  Well developed, well nourished, alert and oriented x 3, in no acute distress. GCS 15. Nontoxic. No dysarthria. No aphasia. HEAD:  normocephalic, atraumatic    EYES:  PERRLA, EOMI.   ENT:  moist mucous membranes   NECK:  supple, symmetric   LUNGS:  Equal air entry bilaterally   CARDIOVASCULAR:  normal s1 / s2, RRR, distal pulses intact   ABDOMEN:  Soft, no rigidity   NEUROLOGIC:  Mental Status:  A & O x3,lethargic             Cranial Nerves:    cranial nerves II-XII are grossly intact    Motor Exam:    Drift:  absent  Tone:  normal    Motor exam is symmetrical 5 out of 5 all extremities bilaterally    Sensory:    Touch:    Right Upper Extremity:  normal  Left Upper Extremity:  normal  Right Lower Extremity:  normal  Left Lower Extremity:  normal    Coordination/Dysmetria:  Finger to Nose:   Right:  normal  Left:  normal   Dysdiadochokinesia:  N/A    Gait:  Not tested     DRAINS:  [] There are no drains for Neuro Critical Care to monitor at this time. ASSESSMENT AND PLAN:       This is a 54-year-old female who presents for an elective procedure for her 5 mm left paraophthalmic ICA aneurysm.   // POD#1 s/p cerebral angiogram with flow diverter  // Chronic migraine    NEUROLOGIC:  - Imaging CT Head this morning with no acute abnormality  - Goal -140  - Neuro checks per protocol  - Migraine treatment    CARDIOVASCULAR:  - Goal -140  - Continue telemetry    PULMONARY:  - Doing well on RA    RENAL/FLUID/ELECTROLYTE:  - BUN 6/ Creatinine 0.68  - Urine output 0.6 ml/kg/hr  - IVF: NS @ 100ml/hr  - Replace electrolytes PRN  - Daily BMP    GI/NUTRITION:  NUTRITION:  Diet NPO Effective Now  - Bowel regimen: glycolax PRN  - GI prophylaxis: not indicated    ID:  - Tmax 98.5(36.9)  - WBC 14.1 likely secondary to steroids  - Continue to monitor for fevers  - Daily CBC    HEME:   - H&H 11.7/35.7  - Platelets 130  - Daily CBC    ENDOCRINE:  - Continue to monitor blood glucose, goal <180    OTHER:  - PT/OT/ST   - Code Status: FULL    PROPHYLAXIS:  Stress ulcer: not indicated    DVT PROPHYLAXIS:  - SCD sleeves - Thigh High     DISPOSITION:  [x] To remain ICU:   [] OK for out of ICU from Neuro Critical Care standpoint    We will continue to follow along. For any changes in exam or patient status please contact Neuro Critical Care.       Janice Peña MD   Neurology PGY-2  Neuro Critical Care  1/26/2021

## 2021-01-26 NOTE — PROCEDURES
STAT MRI'S ORDERED, however, we need mri checklist and we need the name of coils/stents used in her procedure by dr Charlotte Phan. Yesterday. Thanks. Then we can set up a time for pt to come to mri.

## 2021-01-26 NOTE — PROGRESS NOTES
[employer]up[employer]  Occupational Therapy Not Seen Note    DATE: 2021  Name: Talha Wren  : 1980  MRN: [id number]    Patient not available for Occupational Therapy due to:    [x] Testing: Pt off floor for MRI.       Next Scheduled Treatment: Will check back as time allows     Segundo Hatfield OTR/L

## 2021-01-26 NOTE — FLOWSHEET NOTE
A:  responded to [employer], put in from unit clerk. Encounter took place with significant other, who was tearful but did not engage much. I: Upon arrival  spoke briefly with nurse.  entered to visit significant other and patient; but significant other kindly declined speaking at the time.  sustained a presence of ministry and offered a silent prayer on behalf of pateint as nursing staff worked with patient and significant other excused self from room. O: Nurse and unit clerk informed, that when significant other is ready, to speak with a , we are readily available to follow-up and support family/patient during stay. 01/26/21 1853   Encounter Summary   Services provided to: Significant other   Referral/Consult From: Other disciplines   Support System Significant other   Continue Visiting   (1/26/2021)   Complexity of Encounter Moderate   Length of Encounter 30 minutes   Spiritual Assessment Completed Yes   Routine   Type Initial   Assessment Tearful   Intervention Sustaining presence/ Ministry of presence; Active listening   Outcome Refused/declined

## 2021-01-26 NOTE — PROGRESS NOTES
Physical Therapy  DATE: 2021    NAME: Mahad Hendricks  MRN: [id number]   : 1980    Patient not seen this date for Physical Therapy due to:  [] Blood transfusion in progress  [] Hemodialysis  [] Patient Declined  [] Spine Precautions   [] Strict Bedrest  [] Surgery/ Procedure  [] Testing      [x] Other:  Pt in MRI. PT will check back 21. [] PT is being discontinued at this time. Patient independent. No further needs. [] PT is being discontinued at this time due to declining physical/ medical status. Therapy is not appropriate at this time. Seferino Das   Evaluation/treatment performed by Student PT under the supervision of co-signing PT who agrees with all evaluation/treatment and documentation.

## 2021-01-26 NOTE — SEDATION DOCUMENTATION
Anesthesia:   MAC     Wheel in time: 1554    Groin puncture:  3024    Guide Cath placement: 1649    TICI Baseline:       1st run  Deployment time:  1652    1st run Retrieval time: 1657    1st pass TICI score:       2nd pass deployment: 1716    2nd pass retrieval:1725    2nd pass TICI score:  2A      3rd pass deployment time: 1737    3rd pass retrieval time: 1741    3rd pass TICI score: 2c

## 2021-01-27 LAB
ABSOLUTE EOS #: <0.03 K/UL (ref 0–0.44)
ABSOLUTE IMMATURE GRANULOCYTE: 0.11 K/UL (ref 0–0.3)
ABSOLUTE LYMPH #: 0.92 K/UL (ref 1.1–3.7)
ABSOLUTE MONO #: 0.52 K/UL (ref 0.1–1.2)
ANION GAP SERPL CALCULATED.3IONS-SCNC: 8 MMOL/L (ref 9–17)
BASOPHILS # BLD: 0 % (ref 0–2)
BASOPHILS ABSOLUTE: <0.03 K/UL (ref 0–0.2)
BUN BLDV-MCNC: 7 MG/DL (ref 6–20)
BUN/CREAT BLD: ABNORMAL (ref 9–20)
CALCIUM SERPL-MCNC: 7.9 MG/DL (ref 8.6–10.4)
CHLORIDE BLD-SCNC: 112 MMOL/L (ref 98–107)
CO2: 18 MMOL/L (ref 20–31)
CREAT SERPL-MCNC: 0.62 MG/DL (ref 0.5–0.9)
DIFFERENTIAL TYPE: ABNORMAL
EOSINOPHILS RELATIVE PERCENT: 0 % (ref 1–4)
GFR AFRICAN AMERICAN: >60 ML/MIN
GFR NON-AFRICAN AMERICAN: >60 ML/MIN
GFR SERPL CREATININE-BSD FRML MDRD: ABNORMAL ML/MIN/{1.73_M2}
GFR SERPL CREATININE-BSD FRML MDRD: ABNORMAL ML/MIN/{1.73_M2}
GLUCOSE BLD-MCNC: 132 MG/DL (ref 70–99)
HCT VFR BLD CALC: 34.3 % (ref 36.3–47.1)
HEMOGLOBIN: 11.1 G/DL (ref 11.9–15.1)
IMMATURE GRANULOCYTES: 1 %
LYMPHOCYTES # BLD: 5 % (ref 24–43)
MCH RBC QN AUTO: 29.5 PG (ref 25.2–33.5)
MCHC RBC AUTO-ENTMCNC: 32.4 G/DL (ref 28.4–34.8)
MCV RBC AUTO: 91.2 FL (ref 82.6–102.9)
MONOCYTES # BLD: 3 % (ref 3–12)
NRBC AUTOMATED: 0 PER 100 WBC
PDW BLD-RTO: 13.2 % (ref 11.8–14.4)
PLATELET # BLD: 291 K/UL (ref 138–453)
PLATELET ESTIMATE: ABNORMAL
PMV BLD AUTO: 10.3 FL (ref 8.1–13.5)
POTASSIUM SERPL-SCNC: 4.2 MMOL/L (ref 3.7–5.3)
RBC # BLD: 3.76 M/UL (ref 3.95–5.11)
RBC # BLD: ABNORMAL 10*6/UL
SEG NEUTROPHILS: 91 % (ref 36–65)
SEGMENTED NEUTROPHILS ABSOLUTE COUNT: 16.3 K/UL (ref 1.5–8.1)
SODIUM BLD-SCNC: 138 MMOL/L (ref 135–144)
TROPONIN INTERP: NORMAL
TROPONIN INTERP: NORMAL
TROPONIN T: NORMAL NG/ML
TROPONIN T: NORMAL NG/ML
TROPONIN, HIGH SENSITIVITY: <6 NG/L (ref 0–14)
TROPONIN, HIGH SENSITIVITY: <6 NG/L (ref 0–14)
WBC # BLD: 17.9 K/UL (ref 3.5–11.3)
WBC # BLD: ABNORMAL 10*3/UL

## 2021-01-27 PROCEDURE — 6360000002 HC RX W HCPCS: Performed by: STUDENT IN AN ORGANIZED HEALTH CARE EDUCATION/TRAINING PROGRAM

## 2021-01-27 PROCEDURE — 2580000003 HC RX 258: Performed by: STUDENT IN AN ORGANIZED HEALTH CARE EDUCATION/TRAINING PROGRAM

## 2021-01-27 PROCEDURE — 6360000002 HC RX W HCPCS

## 2021-01-27 PROCEDURE — 6370000000 HC RX 637 (ALT 250 FOR IP): Performed by: STUDENT IN AN ORGANIZED HEALTH CARE EDUCATION/TRAINING PROGRAM

## 2021-01-27 PROCEDURE — 85025 COMPLETE CBC W/AUTO DIFF WBC: CPT

## 2021-01-27 PROCEDURE — 99233 SBSQ HOSP IP/OBS HIGH 50: CPT | Performed by: PSYCHIATRY & NEUROLOGY

## 2021-01-27 PROCEDURE — 61645 PERQ ART M-THROMBECT &/NFS: CPT | Performed by: PSYCHIATRY & NEUROLOGY

## 2021-01-27 PROCEDURE — 2500000003 HC RX 250 WO HCPCS: Performed by: STUDENT IN AN ORGANIZED HEALTH CARE EDUCATION/TRAINING PROGRAM

## 2021-01-27 PROCEDURE — C1760 CLOSURE DEV, VASC: HCPCS

## 2021-01-27 PROCEDURE — 97530 THERAPEUTIC ACTIVITIES: CPT

## 2021-01-27 PROCEDURE — 94761 N-INVAS EAR/PLS OXIMETRY MLT: CPT

## 2021-01-27 PROCEDURE — 6370000000 HC RX 637 (ALT 250 FOR IP): Performed by: PSYCHIATRY & NEUROLOGY

## 2021-01-27 PROCEDURE — 97166 OT EVAL MOD COMPLEX 45 MIN: CPT

## 2021-01-27 PROCEDURE — 2709999900 HC NON-CHARGEABLE SUPPLY

## 2021-01-27 PROCEDURE — 84484 ASSAY OF TROPONIN QUANT: CPT

## 2021-01-27 PROCEDURE — 93005 ELECTROCARDIOGRAM TRACING: CPT | Performed by: FAMILY MEDICINE

## 2021-01-27 PROCEDURE — C1769 GUIDE WIRE: HCPCS

## 2021-01-27 PROCEDURE — 80048 BASIC METABOLIC PNL TOTAL CA: CPT

## 2021-01-27 PROCEDURE — 6360000002 HC RX W HCPCS: Performed by: NURSE PRACTITIONER

## 2021-01-27 PROCEDURE — 97535 SELF CARE MNGMENT TRAINING: CPT

## 2021-01-27 PROCEDURE — 2000000003 HC NEURO ICU R&B

## 2021-01-27 PROCEDURE — 61635 INTRACRAN ANGIOPLSTY W/STENT: CPT | Performed by: PSYCHIATRY & NEUROLOGY

## 2021-01-27 PROCEDURE — 97162 PT EVAL MOD COMPLEX 30 MIN: CPT

## 2021-01-27 PROCEDURE — 36415 COLL VENOUS BLD VENIPUNCTURE: CPT

## 2021-01-27 RX ORDER — HYDRALAZINE HYDROCHLORIDE 20 MG/ML
10 INJECTION INTRAMUSCULAR; INTRAVENOUS EVERY 4 HOURS PRN
Status: DISCONTINUED | OUTPATIENT
Start: 2021-01-27 | End: 2021-01-28 | Stop reason: HOSPADM

## 2021-01-27 RX ORDER — FENTANYL CITRATE 50 UG/ML
25 INJECTION, SOLUTION INTRAMUSCULAR; INTRAVENOUS ONCE
Status: COMPLETED | OUTPATIENT
Start: 2021-01-27 | End: 2021-01-27

## 2021-01-27 RX ORDER — MIDAZOLAM HYDROCHLORIDE 1 MG/ML
INJECTION INTRAMUSCULAR; INTRAVENOUS
Status: COMPLETED
Start: 2021-01-27 | End: 2021-01-27

## 2021-01-27 RX ORDER — CLINDAMYCIN PHOSPHATE 600 MG/50ML
600 INJECTION INTRAVENOUS ONCE
Status: COMPLETED | OUTPATIENT
Start: 2021-01-27 | End: 2021-01-27

## 2021-01-27 RX ORDER — FENTANYL CITRATE 50 UG/ML
50 INJECTION, SOLUTION INTRAMUSCULAR; INTRAVENOUS ONCE
Status: COMPLETED | OUTPATIENT
Start: 2021-01-27 | End: 2021-01-27

## 2021-01-27 RX ORDER — NICARDIPINE HYDROCHLORIDE 0.1 MG/ML
3-15 INJECTION INTRAVENOUS CONTINUOUS
Status: DISCONTINUED | OUTPATIENT
Start: 2021-01-27 | End: 2021-01-27

## 2021-01-27 RX ORDER — HEPARIN SODIUM 5000 [USP'U]/ML
5000 INJECTION, SOLUTION INTRAVENOUS; SUBCUTANEOUS EVERY 8 HOURS SCHEDULED
Status: DISCONTINUED | OUTPATIENT
Start: 2021-01-27 | End: 2021-01-28 | Stop reason: HOSPADM

## 2021-01-27 RX ORDER — MIDAZOLAM HYDROCHLORIDE 2 MG/2ML
0.5 INJECTION, SOLUTION INTRAMUSCULAR; INTRAVENOUS ONCE
Status: COMPLETED | OUTPATIENT
Start: 2021-01-27 | End: 2021-01-27

## 2021-01-27 RX ORDER — FENTANYL CITRATE 50 UG/ML
INJECTION, SOLUTION INTRAMUSCULAR; INTRAVENOUS
Status: COMPLETED
Start: 2021-01-27 | End: 2021-01-27

## 2021-01-27 RX ADMIN — FENTANYL CITRATE 50 MCG: 50 INJECTION, SOLUTION INTRAMUSCULAR; INTRAVENOUS at 10:46

## 2021-01-27 RX ADMIN — TICAGRELOR 90 MG: 90 TABLET ORAL at 08:20

## 2021-01-27 RX ADMIN — HEPARIN SODIUM 5000 UNITS: 5000 INJECTION INTRAVENOUS; SUBCUTANEOUS at 14:13

## 2021-01-27 RX ADMIN — MIDAZOLAM HYDROCHLORIDE 0.5 MG: 2 INJECTION, SOLUTION INTRAMUSCULAR; INTRAVENOUS at 10:36

## 2021-01-27 RX ADMIN — HYDRALAZINE HYDROCHLORIDE 10 MG: 20 INJECTION INTRAMUSCULAR; INTRAVENOUS at 02:59

## 2021-01-27 RX ADMIN — HEPARIN SODIUM 5000 UNITS: 5000 INJECTION INTRAVENOUS; SUBCUTANEOUS at 22:04

## 2021-01-27 RX ADMIN — TICAGRELOR 90 MG: 90 TABLET ORAL at 20:14

## 2021-01-27 RX ADMIN — DEXAMETHASONE SODIUM PHOSPHATE 4 MG: 4 INJECTION, SOLUTION INTRAMUSCULAR; INTRAVENOUS at 02:59

## 2021-01-27 RX ADMIN — FENTANYL CITRATE 50 MCG: 50 INJECTION, SOLUTION INTRAMUSCULAR; INTRAVENOUS at 04:29

## 2021-01-27 RX ADMIN — DOCUSATE SODIUM 50MG AND SENNOSIDES 8.6MG 1 TABLET: 8.6; 5 TABLET, FILM COATED ORAL at 20:14

## 2021-01-27 RX ADMIN — DEXAMETHASONE SODIUM PHOSPHATE 4 MG: 4 INJECTION, SOLUTION INTRAMUSCULAR; INTRAVENOUS at 11:44

## 2021-01-27 RX ADMIN — CLINDAMYCIN IN 5 PERCENT DEXTROSE 600 MG: 12 INJECTION, SOLUTION INTRAVENOUS at 14:13

## 2021-01-27 RX ADMIN — Medication 10 MG: at 17:55

## 2021-01-27 RX ADMIN — Medication 81 MG: at 08:20

## 2021-01-27 RX ADMIN — DOCUSATE SODIUM 50MG AND SENNOSIDES 8.6MG 1 TABLET: 8.6; 5 TABLET, FILM COATED ORAL at 08:20

## 2021-01-27 RX ADMIN — MIDAZOLAM HYDROCHLORIDE 0.5 MG: 1 INJECTION, SOLUTION INTRAMUSCULAR; INTRAVENOUS at 10:36

## 2021-01-27 RX ADMIN — Medication 10 MG: at 22:04

## 2021-01-27 RX ADMIN — ONDANSETRON 4 MG: 2 INJECTION INTRAMUSCULAR; INTRAVENOUS at 08:29

## 2021-01-27 RX ADMIN — FENTANYL CITRATE 25 MCG: 50 INJECTION, SOLUTION INTRAMUSCULAR; INTRAVENOUS at 10:26

## 2021-01-27 RX ADMIN — SODIUM CHLORIDE, PRESERVATIVE FREE 10 ML: 5 INJECTION INTRAVENOUS at 20:14

## 2021-01-27 RX ADMIN — ACETAMINOPHEN 650 MG: 325 TABLET ORAL at 02:20

## 2021-01-27 NOTE — PROGRESS NOTES
Physical Therapy    Facility/Department: [address]  Initial Assessment    NAME: Arlen Mckinley  : 1980  MRN: [id number]    Date of Service: 2021    Discharge Recommendations:    Further therapy recommended at discharge and the patient should be able to tolerate at least 3 hours per day over 5 days or 15 hours over 7 days. PT Equipment Recommendations  Other: CTA, required RW to safetly attempt amb with assistance    Assessment   Body structures, Functions, Activity limitations: Decreased functional mobility ; Decreased strength;Decreased endurance;Decreased balance  Assessment: Pt grossly Nicola to CGA, amb 3' RW Nicola. Pt limited by safety/ deficit inawareness, fatigue, decreased balance and strength. Pt would benefit from continued acute PT to address deficits. Prognosis: Good  Decision Making: Medium Complexity  PT Education: Plan of Care;PT Role;General Safety  REQUIRES PT FOLLOW UP: Yes  Activity Tolerance  Activity Tolerance: Patient Tolerated treatment well;Patient limited by fatigue;Patient limited by endurance  Activity Tolerance: difficult to assess orientation d/t expressive aphasia, appeared to follow conversation throughout. Patient Diagnosis(es): Diagnoses of Cerebral aneurysm and Aneurysm ([address] were pertinent to this visit. has a past medical history of Carotid aneurysm, left ([address] Migraines, and Wellness examination. has a past surgical history that includes Cardiac catheterization (2021); Cardiac catheterization; and Cardiac catheterization (2021). Restrictions  Restrictions/Precautions  Restrictions/Precautions: Fall Risk, General Precautions  Required Braces or Orthoses?: No  Position Activity Restriction  Other position/activity restrictions: s/p cerebral angio with thrombectomy  and aneurysm embolization   Vision/Hearing  Vision: Within Functional Limits  Hearing: Within functional limits     Subjective  General  Chart Reviewed:  Yes Patient assessed for rehabilitation services?: Yes  Response To Previous Treatment: Not applicable  Family / Caregiver Present: No  Follows Commands: Within Functional Limits  General Comment  Comments: OT co-eval  Subjective  Subjective: RN and pt agreeable to PT.  Pt alert sitting EOB with OT upon arrival.  Pain Screening  Patient Currently in Pain: Denies  Vital Signs  Patient Currently in Pain: Denies  Pre Treatment Pain Screening  Intervention List: Patient able to continue with treatment    Orientation  Orientation  Orientation Level: Oriented to person;Disoriented to place(difficult to assess d/t expressive aphasia)  Social/Functional History  Social/Functional History  Lives With: Spouse, Son  Type of Home: House  Home Layout: Two level, Laundry in basement, Bed/Bath upstairs, Able to Live on Main level with bedroom/bathroom  Home Access: Stairs to enter without rails  Entrance Stairs - Number of Steps: 3-4  Bathroom Shower/Tub: Tub/Shower unit  Bathroom Toilet: Standard  Bathroom Equipment: (none)  Bathroom Accessibility: Accessible  Home Equipment: Standard walker  Receives Help From: Family  ADL Assistance: Independent  Homemaking Assistance: Independent  Homemaking Responsibilities: Yes(split.)  Ambulation Assistance: Independent  Transfer Assistance: Independent  Active : Yes  Mode of Transportation: Car, Truck, SUV  Occupation: Full time employment  Type of occupation: [employer]  Leisure & Hobbies: TV  Cognition        Objective          AROM RLE (degrees)  RLE AROM: WFL  AROM LLE (degrees)  LLE AROM : WFL  AROM RUE (degrees)  RUE AROM : WFL  AROM LUE (degrees)  LUE AROM : WFL  Strength RLE  Comment: 4-  Strength LLE  Comment: 4-  Strength RUE  Comment: minimum antigravity, see OT  Strength LUE  Comment: minimum antigravity, see OT     Sensation  Overall Sensation Status: WFL(Pt denies any numbness or tingling)  Bed mobility  Comment: EOB with OT upon arrival.  Transfers Sit to Stand: Contact guard assistance  Stand to sit: Contact guard assistance  Ambulation  Ambulation?: Yes  Ambulation 1  Surface: level tile  Device: Rolling Walker  Assistance: Minimal assistance  Quality of Gait: mildly slowed, no LOB, no buckling, inattentive to AD, assist to manage  Distance: 3'  Stairs/Curb  Stairs?: No     Balance  Posture: Fair  Sitting - Static: Good  Sitting - Dynamic: Good;-  Standing - Static: Fair;+  Standing - Dynamic: Fair  Comments: RW used while assessing standing balance  Exercises  Comments: EOB ~8min, Nicola R lean improving to CGA.      Plan   Plan  Times per week: 6-7x/wk  Current Treatment Recommendations: Strengthening, Balance Training, Functional Mobility Training, Transfer Training, Gait Training, Endurance Training, Home Exercise Program, Safety Education & Training, Patient/Caregiver Education & Training, Equipment Evaluation, Education, & procurement, Stair training  Safety Devices  Type of devices: Nurse notified, Gait belt, Patient at risk for falls, Left in chair, All fall risk precautions in place, Chair alarm in place(left with OT)  Restraints  Initially in place: No    AM-PAC Score  AM-PAC Inpatient Mobility Raw Score : 14 (01/27/21 1612)  AM-PAC Inpatient T-Scale Score : 38.1 (01/27/21 1612)  Mobility Inpatient CMS 0-100% Score: 61.29 (01/27/21 1612)  Mobility Inpatient CMS G-Code Modifier : CL (01/27/21 1612)          Goals  Short term goals  Time Frame for Short term goals: 14 visits  Short term goal 1: Pt will be Ave bed mobility  Short term goal 2: Pt will be Ave transfers  Short term goal 3: Pt will be Ave amb 240' RW or least restrictive AD  Short term goal 4: Pt will navigate 5 steps Ave       Therapy Time   Individual Concurrent Group Co-treatment   Time In 1414         Time Out 1443         Minutes 29         Timed Code Treatment Minutes: 8 Minutes       Deisy Ceja, PT

## 2021-01-27 NOTE — PROGRESS NOTES
Occupational Therapy   Occupational Therapy Initial Assessment  Date: 2021   Patient Name: Claven Scheuermann  MRN: [id number]     : 1980    Date of Service: 2021    Discharge Recommendations: Further therapy recommended at discharge. CTA as pt progresses. OT Equipment Recommendations  Equipment Needed: Yes  Mobility Devices: ADL Assistive Devices  ADL Assistive Devices: Sock-Aid Hard;Grab Bars - shower; Feeding Devices; Long-handled Sponge;Grab Bars - toilet; Shower Chair with back    Assessment   Performance deficits / Impairments: Decreased functional mobility ; Decreased safe awareness;Decreased balance;Decreased coordination;Decreased posture;Decreased ADL status; Decreased cognition;Decreased ROM; Decreased endurance;Decreased high-level IADLs;Decreased strength;Decreased fine motor control  Prognosis: Good  Decision Making: Medium Complexity  OT Education: OT Role;Plan of Care;Energy Conservation;IADL Safety; Family Education; ADL Adaptive Strategies;Transfer Training;Orientation  REQUIRES OT FOLLOW UP: Yes  Activity Tolerance  Activity Tolerance: Patient Tolerated treatment well;Patient limited by fatigue  Activity Tolerance: Pt noticably fatigued after func mob from bed to chair, req 4-5 rest breaks during seated ADL tasks  Safety Devices  Safety Devices in place: Yes  Type of devices: All fall risk precautions in place;Gait belt;Left in chair;Call light within reach; Chair alarm in place;Nurse notified( in room upon writer departure)  Restraints  Initially in place: No         Patient Diagnosis(es): Diagnoses of Cerebral aneurysm and Aneurysm ([facility] [address] were pertinent to this visit. has a past medical history of Carotid aneurysm, left ([address] Migraines, and Wellness examination. has a past surgical history that includes Cardiac catheterization (2021); Cardiac catheterization; and Cardiac catheterization (2021).        Restrictions  Restrictions/Precautions Functional - Mobility Device: Rolling Walker  Activity: Other(bed to chair)  Assist Level: Contact guard assistance  ADL  Feeding: Stand by assistance; Increased time to complete;Setup  Grooming: Increased time to complete;Setup;Verbal cueing;Minimal assistance(Pt washed face and brushed teeth seated in chair with min A to support RUE)  UE Bathing: Moderate assistance; Increased time to complete;Setup(Req assist for reaching across torso, Max A to wash back during seated UB bathing activity)  LE Bathing: Increased time to complete;Setup;Maximum assistance  UE Dressing: Minimal assistance; Increased time to complete;Setup(donned/doffed gown with min A d/t lines and poor FMC in dominant RUE)  LE Dressing: Maximum assistance(Pt unable to reach socks seated EOB)  Toileting: Increased time to complete; Moderate assistance;Setup(Mod A d/t safety/cognition)  Additional Comments: Pt demo'd LB dressing sitting EOB, completed UB sponge bath/oral hygiene/wash face/don/doff gown sitting in recliner  Tone RUE  RUE Tone: Normotonic  Tone LUE  LUE Tone: Normotonic  Coordination  Movements Are Fluid And Coordinated: No  Coordination and Movement description: Fine motor impairments;Gross motor impairments;Decreased speed;Decreased accuracy; Right UE  Quality of Movement Other  Comment: Poor FMC distally and decreased accuracy grossly in RUE. See ROM/strength. Quality of movement improved over session. Bed mobility  Rolling to Left: Moderate assistance  Supine to Sit: Moderate assistance;2 Person assistance(progress BLE, trunk, hips)  Sit to Supine: Unable to assess  Scooting: Moderate assistance  Comment: Pt supine in bed upon writer arrival. Pt retired to recliner upon [address].  Utilized bedrails and handhold assist for initial bed mobility, independence greatly improved over duration of OT eval/treatment session  Transfers  Sit to stand: Contact guard assistance  Stand to sit: Contact guard assistance Transfer Comments: CGA for balance d/t reported lightheadedness/dizziness upon initial supine>sit     Cognition  Overall Cognitive Status: Exceptions  Arousal/Alertness: Appropriate responses to stimuli  Following Commands: Follows one step commands with increased time; Follows multistep commands with increased time  Attention Span: Difficulty dividing attention; Attends with cues to redirect  Memory: Decreased recall of biographical Information;Decreased short term memory(may be d/t aphasia)  Problem Solving: Assistance required to correct errors made;Assistance required to implement solutions;Good awareness of errors made  Insights: Fully aware of deficits  Initiation: Requires cues for some  Sequencing: Does not require cues     Sensation  Overall Sensation Status: WFL(Pt denies any numbness or tingling)    LUE AROM (degrees)  LUE AROM : WFL  Left Hand AROM (degrees)  Left Hand AROM: WFL  RUE PROM (degrees)  RUE PROM: WFL  RUE AROM (degrees)  RUE AROM : Exceptions  RUE General AROM: RUE AROM WFL with wrist/elbow. Hand AROM and shoulder limited  R Shoulder Flexion 0-180: 0-60  R Shoulder ABduction 0-180: 0-70  Right Hand PROM (degrees)  Right Hand PROM: WFL  Right Hand AROM (degrees)  Right Hand AROM: Exceptions  Right Hand General AROM: unable to achieve opposition or pincer grasp independently  LUE Strength  Gross LUE Strength: WFL  L Hand General: 4+/5  LUE Strength Comment: LUE grossly 4+/5, pt reports pain with elbow flexion d/t line insertion  RUE Strength  Gross RUE Strength: Exceptions to [facility]  R Hand General: 0/5  RUE Strength Comment: Gross RUE strength not tested d/t poor AROM. Fair strength in elbow/wrist during func ADL activity, some shoulder flexion against gravity, poor strength in R hand - pt unable to complete fist or grasp with R hand. RUE function improved over duration of OT session.      Plan   Plan  Times per week: 4-5x weekly    AM-PAC Score AM-PAC Inpatient Daily Activity Raw Score: 14 (01/27/21 1541)  AM-PAC Inpatient ADL T-Scale Score : 33.39 (01/27/21 1541)  ADL Inpatient CMS 0-100% Score: 59.67 (01/27/21 1541)  ADL Inpatient CMS G-Code Modifier : CK (01/27/21 1541)    Goals  Short term goals  Time Frame for Short term goals: Pt will upon discharge  Short term goal 1: demo min A UB ADLs using AE, setup, rachel techs PRN  Short term goal 2: demo mod I feeding/grooming with setup and AE PRN  Short term goal 3: demo good dynamic unsupported sitting balance (sup) during functional ADL activity for 20+ min  Short term goal 4: demo safe func mob (sup) during functional ADL task using LRD  Short term goal 5: demo mod A LB ADLs with AE/setup  Short term goal 6: demo use of BUE during SBA functional ADL task for 7+ min with 1-2 VC  Patient Goals   Patient goals : go home       Therapy Time   Individual Concurrent Group Co-treatment   Time In 1407         Time Out 1508         Minutes 61         Timed Code Treatment Minutes: Cheyenne Foley 1154       Osei Rapp, OT/S

## 2021-01-27 NOTE — PROGRESS NOTES
Daily Progress Note  Neuro Critical Care    Patient Name: Keisha Allen  Patient : 1980  Room/Bed: 0530/0530-01  Code Status: FULL  Allergies: Allergies   Allergen Reactions    Amoxicillin Swelling     lips    Penicillins Other (See Comments)     Pt. Unsure of reaction       CHIEF COMPLAINT:     Elective aneurysm coiling      INTERVAL HISTORY    Initial Presentation (Admitted 2021): This is a 79-year-old female with a history of migraines who presented for elective aneurysm coiling. Patient follows with general neurology for headaches and was noted to have worsening headache behind her left eye. She had a CTA head and neck done which revealed a 5 mm left paraophthalmic ICA aneurysm. She was evaluated by endovascular neurosurgery and recommended coiling. Patient was placed on dual antiplatelet therapy of aspirin 325 mg and Plavix 75 mg daily. Hospital Course:   Patient was taken to the angio suite for cerebral angiogram.  Noted to have a left superior hypophyseal aneurysm. This was treated using a flow diverter. : Postprocedure, patient had concern of right upper extremity/hand weakness. Started on Decadron 4 mg every 8. no acute concerns overnight. This morning patient still had complaint of a headache and was more lethargic. Had stat MRI MRA done revealing    Last 24h:   MRI Brain had scattered left MCA stroke with MRA showing occlusion of ICA. Taken for emergent angiogram with mechanical thrombectomy of L cavernous ICA occlusion. Post procedural CT with no hemorrhage. No acute events overnight.  Had brilinta load followed by 90mg BID      CURRENT MEDICATIONS:  SCHEDULED MEDICATIONS:   aspirin  81 mg Oral Daily    ticagrelor  90 mg Oral BID    sodium chloride flush  10 mL Intravenous 2 times per day    sennosides-docusate sodium  1 tablet Oral BID    dexamethasone  4 mg Intravenous Q8H     CONTINUOUS INFUSIONS:   niCARdipine      cangrelor (KENGREAL) infusion 2 mcg/kg/min (21)    sodium chloride 100 mL/hr at 21 1943     PRN MEDICATIONS:   hydrALAZINE, sodium chloride flush, acetaminophen, promethazine **OR** ondansetron, labetalol, sodium chloride flush, promethazine **OR** ondansetron, polyethylene glycol, acetaminophen **OR** acetaminophen    VITALS:  Temperature Range: Temp: 97.8 °F (36.6 °C) Temp  Av.1 °F (36.7 °C)  Min: 97.7 °F (36.5 °C)  Max: 98.4 °F (36.9 °C)  BP Range: Systolic (46OLE), VSK:751 , Min:118 , QTQ:586     Diastolic (83RCG), LDS:53, Min:69, Max:119    Pulse Range: Pulse  Av.1  Min: 50  Max: 100  Respiration Range: Resp  Av.9  Min: 0  Max: 27  Current Pulse Ox: SpO2: 100 %  24HR Pulse Ox Range: SpO2  Av.7 %  Min: 91 %  Max: 100 %  Patient Vitals for the past 12 hrs:   BP Temp Temp src Pulse Resp SpO2   21 0700 136/83   62 (!) 0    21 0600 (!) 134/106   58 (!) 0    21 0500 124/75   59 (!) 0    21 0410 (!) 170/104   85 (!) 7    21 0400  97.8 °F (36.6 °C) Oral 83 (!) 4    21 0330    60 (!) 0    21 0300 (!) 164/85   61 (!) 0 100 %   21 0230    53 (!) 0    21 0200 (!) 166/95   60 (!) 0 100 %   21 0130 (!) 143/87   53 (!) 0    21 0100 (!) 151/84   55 (!) 0 100 %   21 0030 (!) 143/81   52 (!) 0    21 0000 138/81 97.7 °F (36.5 °C) Oral 50 13 100 %   21 2300 125/77   55 (!) 0 100 %   210 136/75   53 (!) 0    21 139/69   50 (!) 0 100 %   21 (!) 143/78   57 (!) 0    21 124/89   57 (!) 0    21 124/71   64 (!) 6    21 125/71   56 (!) 0 100 %   21 (!) 144/73   52 (!) 0    21 (!) 138/101   100 11    21 134/79   62 (!) 4    21 118/80 98.2 °F (36.8 °C) Oral 62 (!) 1 100 %     Estimated body mass index is 34.75 kg/m² as calculated from the following:    Height as of this encounter: 5' 2\" (1.575 m). Weight as of this encounter: 190 lb (86.2 kg).  []<16 Severe malnutrition  []1616.99 Moderate malnutrition  []1718.49 Mild malnutrition  []18.524.9 Normal  []2529.9 Overweight (not obese)  [x]3034.9 Obese class 1 (Low Risk)  []3539.9 Obese class 2 (Moderate Risk)  []?40 Obese class 3 (High Risk)    RECENT LABS:   Lab Results   Component Value Date    WBC 17.9 (H) 01/27/2021    HGB 11.1 (L) 01/27/2021    HCT 34.3 (L) 01/27/2021     01/27/2021    CHOL 201 (H) 05/17/2018    TRIG 50 05/17/2018    HDL 94 05/17/2018    ALT 6 01/26/2021    AST 9 01/26/2021     01/27/2021    K 4.2 01/27/2021     (H) 01/27/2021    CREATININE 0.62 01/27/2021    BUN 7 01/27/2021    CO2 18 (L) 01/27/2021    TSH 1.77 05/17/2018    LABA1C 5.5 02/07/2012     24 HOUR INTAKE/OUTPUT:    Intake/Output Summary (Last 24 hours) at 1/27/2021 0748  Last data filed at 1/27/2021 0400  Gross per 24 hour   Intake 2228 ml   Output 2855 ml   Net -627 ml       IMAGING:   CT Head 1/26/2021  Impression   No acute intracranial abnormality.         Labs and Images reviewed with:  [] Dr. Marty Javed. Terence    [x] Dr. Venecia Burgos  [] Dr. Mario Obregon  [] There are no new interval images to review. PHYSICAL EXAM       CONSTITUTIONAL:  Well developed, well nourished, alert and oriented x 3, in no acute distress. GCS 15. Nontoxic. No dysarthria. No aphasia.    HEAD:  normocephalic, atraumatic    EYES:  PERRLA, EOMI.   ENT:  moist mucous membranes   NECK:  supple, symmetric   LUNGS:  Equal air entry bilaterally   CARDIOVASCULAR:  normal s1 / s2, RRR, distal pulses intact   ABDOMEN:  Soft, no rigidity   NEUROLOGIC:  Mental Status:  A & O x3,lethargic             Cranial Nerves:    cranial nerves II-XII are grossly intact    Motor Exam:    Drift:  absent  Tone:  Normal    RUE 4/5 LUE 5/5  RLE/LLE 5/5      Sensory:  SILT  Touch:    Right Upper Extremity:  normal  Left Upper Extremity:  normal  Right Lower Extremity:  normal  Left Lower Extremity:  normal    Coordination/Dysmetria:  Finger to Nose:   Right:  normal  Left:  normal   Dysdiadochokinesia:  N/A    Gait:  Not tested     DRAINS:  [] There are no drains for Neuro Critical Care to monitor at this time. ASSESSMENT AND PLAN:       This is a 50-year-old female who presents for an elective procedure for her 5 mm left paraophthalmic ICA aneurysm. // POD#2 s/p cerebral angiogram with flow diverter  // Chronic migraine  // POD#1 s/p emergent angiogram with mechanical thrombectomy of L cavernous ICA occlusion. NEUROLOGIC:  - Imaging CT Head this morning with no acute abnormality  - Goal -150  - Neuro checks per protocol  - Continue ASA 81mg and Brilinta 90mg BID  - Decadron 4mg q8hrs for headache showed improvement. Discussed with EVNS and ok to stop for now and consider restarting should headache worsen. CARDIOVASCULAR:  - Goal -150  - Continue telemetry    PULMONARY:  - Doing well on RA    RENAL/FLUID/ELECTROLYTE:  - BUN 8/ Creatinine 0.43  - Urine output 695 last shift  - IVF: NS @ 50ml/hr  - Replace electrolytes PRN  - Daily BMP    GI/NUTRITION:  NUTRITION:  DIET CARB CONTROL; Carb Control: 4 carb choices (60 gms)/meal  - Bowel regimen: glycolax PRN  - GI prophylaxis: not indicated    ID:  - Tmax 98.2 (36.8)  - WBC 17.9 likely secondary to steroids  - Continue to monitor for fevers  - Daily CBC    HEME:   - H&H 11.1/34.3  - Platelets 205  - Daily CBC    ENDOCRINE:  - Continue to monitor blood glucose, goal <180    OTHER:  - PT/OT/ST   - Code Status: FULL    PROPHYLAXIS:  Stress ulcer: not indicated    DVT PROPHYLAXIS:  - SCD sleeves - Thigh High     DISPOSITION:  [x] To remain ICU:   [] OK for out of ICU from Neuro Critical Care standpoint    We will continue to follow along. For any changes in exam or patient status please contact Neuro Critical Care.       Evelio Cortes MD   Neurology PGY-2  Neuro Critical Care  1/27/2021

## 2021-01-27 NOTE — PROGRESS NOTES
Occupational Therapy    Occupational Therapy Not Seen Note    DATE: 2021  Name: Talha Wren  : 1980  MRN: [id number]    Patient not available for Occupational Therapy due to:    Strict Bedrest: Until sheath removed, hold OT eval.    Next Scheduled Treatment: Attempt in pm as time allows.     Electronically signed by Summer Arriaza OT on 2021 at 10:44 AM

## 2021-01-27 NOTE — PROGRESS NOTES
Endovascular Neurosurgery Progress Note    SUBJECTIVE:   No reported events since last note. Pt this am was more confused, and had some difficulty speaking, with perseveration when attempting to answer questions. She complains of persistent headache. Review of Systems:  CONSTITUTIONAL:  negative for fevers, chills, fatigue and malaise    EYES:  negative for double vision, blurred vision and photophobia     HEENT:  negative for tinnitus, epistaxis and sore throat    RESPIRATORY:  negative for cough, shortness of breath, wheezing    CARDIOVASCULAR:  negative for chest pain, palpitations, syncope, edema    GASTROINTESTINAL:  negative for nausea, vomiting    GENITOURINARY:  negative for incontinence    MUSCULOSKELETAL:  negative for neck or back pain    NEUROLOGICAL:  Negative for weakness and tingling  negative for headaches and dizziness    PSYCHIATRIC:  negative for anxiety      Review of systems otherwise negative. OBJECTIVE:     Vitals:    01/26/21 1400   BP: (!) 145/92   Pulse: 72   Resp: 18   Temp:    SpO2: 97%        General:  Gen: obese habitus, NAD  HEENT: NCAT, mucosa moist  Cvs: RRR, S1 S2 normal  Resp: symmetric unlabored breathing  Abd: s/nd/nt  Ext: no edema  Skin: no lesions seen, warm and dry. R groin tender, no hematoma    Neuro:  Gen: sleepy but arousable to voice, oriented x3. Lang/speech: mild aphasia, perseveration. no dysarthria. Follows commands. CN: PERRL, EOMI, VFF, V1-3 intact, face symmetric, hearing intact, shoulder shrug symmetric, tongue midline  Motor: L hemibody 5/5. R hand  4+/5, otherwise 5/5. No drift. RLE 5/5. Sense: LT intact in all 4 ext. Coord: FTN intact b/l  DTR: deferred  Gait: deferred    NIH Stroke Scale:   1a  Level of consciousness: 1 - not alert but arousable by minor stimulation to obey, answer or respond   1b. LOC questions:  0 - answers both questions correctly   1c. LOC commands: 0 - performs both tasks correctly   2. Best Gaze: 0 - normal   3. Visual: 0 - no visual loss   4. Facial Palsy: 0 - normal symmetric movement   5a. Motor left arm: 0 - no drift, limb holds 90 (or 45) degrees for full 10 seconds   5b. Motor right arm: 0 - no drift, limb holds 90 (or 45) degrees for full 10 seconds   6a. Motor left le - no drift; leg holds 30 degree position for full 5 seconds   6b  Motor right le - no drift; leg holds 30 degree position for full 5 seconds   7. Limb Ataxia: 0 - absent   8. Sensory: 0 - normal; no sensory loss   9. Best Language:  1 - mild to moderate aphasia; some obvious loss of fluency or facility of comprehension without significant limitation on ideas expressed or form of expression. Reduction of speech and/or comprehension, however, makes conversation about provided materials difficult or impossible. For example, in conversation about provided materials, examiner can identify picture or naming card content from patient's response. 10. Dysarthria: 0 - normal   11. Extinction and Inattention: 0 - no abnormality         Total:   2     MRS: 1      LABS:   Reviewed. RADIOLOGY:   Images were personally reviewed including:  CT head wo con 2021  No acute intracranial abnormality. DSA 2021  1. Left superior hypophyseal aneurysm projecting medially measuring approximately 5.15 x 4.2 x 4.5 mm with neck size at 2.78 mm. Proximal parent vessel diameter at 2.98 mm. 2. The above-mentioned aneurysm was successfully treated using flow diversion with surpass 3.25 x 15 mm. The flow diverter appears in good wall apposition. There is contrast stagnation noted in the aneurysm with Venessa Reaper class at 3. No distal thromboembolic complications noted. ASSESSMENT:   40yo female with pmh of migraines, incidental L paraophthalmic ICA aneurysm. s/p aneurysm embo with surpass evolve FD device 2021. Post procedure pt had mild RUE weakness.  CT head overnight neg for stroke    2021 am pt has new speech issues and perseveration,

## 2021-01-27 NOTE — FLOWSHEET NOTE
Post Procedure Transfer from IR Table  [x] Tubes and Lines intact     Post Procedure Neuro-Checks/NIHSS/Vitals  [x] Completed post procedure  [x] Completed bedside handoff  [x] Frequency ordered  [x] Verbal communication of frequency      Post Procedure Puncture Site Checks  [x] Completed post procedure  [x] Completed bedside handoff  [x] Frequency ordered  [x] Verbal communication of frequency    Post Procedure Pulse  Checks  [x] Completed post procedure  [x] Completed bedside handoff  [x] Frequency ordered  [x] Verbal communication of frequency    Order Set  [x] Post Neuro-Endo Procedure  [x] Stroke  [] t-PA     B/P control  [x] Verbal Communication   [x] Order in Care Path    Medication Review  [x] Given during procedure  [x] Current drips/meds/fluids    [] Physician Notified of All changes in Assessment no changes in assessment    Family  [x] Location Post Procedure  5b waiting room

## 2021-01-27 NOTE — OP NOTE
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 Procedure Performed:Cerebral angiogram with mechanical thrombectomy of the Left cavernous ICA occlusion. Surgeon:   Maxwell James MD    Fellow:  Burak Castellanos MD and Ellen Funez MD, PhD     1st Assistant:  Brenda Madrigal    PRE-PROCEDURAL EXAM:  Prestroke baseline mRS MODIFIED ERLIN SCORE: 1 - No significant disability: despite symptoms, able to carry out all usual duties and activities. Neurological exam performed and unchanged from initial H&P or consult    Anesthesia: MAC anesthesia  Complications: none    Intra-Operative EXAM:  Neurological exam performed and unchanged from initial H&P or consult    EBL: < Minimal      Cc            Specimens: Were not Obtained  Contrast:     Visipaque 270 low osmolar 140 Cc             Fluoro:66.5 min    Findings:  Please see dictated Radiology note for further details  --Left cervical ICA pseudoocclusion due to left cavernous thrombosis. --Successful intra-arterial left cavernous ICA thrombectomy using contact aspiration with ACE 68 and 3MAX (total 3 passes), followed by balloon angioplasty using Quantum apex 3 x 12 mm, followed by left cavernous ICA stenting using Neuroform [place] stent 4 x 24 mm.   --The above-mentioned treatments resulted in successful recanalization with TICI 2C with distal left posterior parietal artery M3/M4 occlusion noted. There is residual left cavernous ICA subocclusive thrombus noted. --Intra-arterial cangrelor was given for left cavernous ICA flow diverter thrombosis (using 2.5 mg per KG over 8 minutes as a bolus dose). TICI score: 2c  Asad score: class I    POST-PROCEDURAL EXAM :   Stable neurological Exam  Neurological exam performed and unchanged from initial H&P or consult    Closure: Left groin 8 [place] sheath was left in place with the plan to remove tomorrow. POST-PROCEDURAL MONITORING : see orders  Disposition: Neuro ICU    Recommendations:  Back to NSICU. Do not bend left leg - will close 8 fr sheath access in am once off cangrelor  Groin checks per protocol. Neuro checks per icu protocol. Peripheral pulse checks per protocol. ICU management per NSICU team.   SBP goal 120-150   Upon discharge follow up with Dr. Aris Laurent in 2 weeks and Dr. Kacey Costello in 3 months with  MRA head with and without contrast.  Obtain CT head today at 11 PM.  Please give Brilinta 180 mg loading dose now followed by Brilinta 90 mg twice daily. (may turn off cangrelor two hrs after brilinta dose)  Continue aspirin 81 mg daily tomorrow morning with Brilinta 90 mg twice daily.       MD Michael Wadsworth MD   Pager [phone]  Stroke, [facility]  [address] [place]  Electronically signed 1/26/2021 at 7:26 PM

## 2021-01-27 NOTE — PROCEDURES
Proceduralist:  Glory Novak     Procedure:  Left groin femoral artery sheath removal and closure device placement    Description:  Site and patient confirmed. Line and sutures were removed and the L groin site was cleaned in sterile fashion 600mg clindamycin was given, as well as 75ug fentanyl and 0.5mg versed. The 8Fshort sheath was removed and an 8F angioseal closure device was placed. Wound was covered with quickclot and tegaderm. There were no complications, pt tolerated the procedure well.     Blood loss: minimal.

## 2021-01-27 NOTE — PROGRESS NOTES
Physical Therapy  DATE: 2021    NAME: Arlen Mckinley  MRN: [id number]   : 1980    Patient not seen this date for Physical Therapy due to:  [] Blood transfusion in progress  [] Hemodialysis  [] Patient Declined  [] Spine Precautions   [] Strict Bedrest  [] Surgery/ Procedure  [] Testing      [x] Other: sheath in place, ck pm as able        [] PT is being discontinued at this time. Patient independent. No further needs. [] PT is being discontinued at this time due to declining physical/ medical status. Therapy is not appropriate at this time.     Radha Camargo, PT

## 2021-01-27 NOTE — ANESTHESIA POSTPROCEDURE EVALUATION
Department of Anesthesiology  Postprocedure Note    Patient: Jonathan Mendoza  MRN: [id number]  YOB: 1980  Date of evaluation: 1/26/2021  Time:  8:28 PM     Procedure Summary     Date: 01/26/21 Room / Location: [facility] Special Procedures    Anesthesia Start: 2291 Anesthesia Stop: 1950    Procedure: IR ANGIOGRAM CAROTID CEREBRAL BILATERAL Diagnosis: (thrombectomy)    Scheduled Providers:  Responsible Provider: Daquan Allen MD    Anesthesia Type: MAC ASA Status: 3 - Emergent          Anesthesia Type: MAC    Nahomi Phase I: Nahomi Score: 7    Nahomi Phase II:      Last vitals: Reviewed and per EMR flowsheets.        Anesthesia Post Evaluation    Patient location during evaluation: ICU  Patient participation: complete - patient cannot participate  Level of consciousness: lethargic  Pain scale: NA.  Nausea & Vomiting: no vomiting  Cardiovascular status: hemodynamically stable  Respiratory status: nasal cannula

## 2021-01-28 VITALS
HEIGHT: 62 IN | WEIGHT: 190 LBS | RESPIRATION RATE: 1 BRPM | SYSTOLIC BLOOD PRESSURE: 153 MMHG | DIASTOLIC BLOOD PRESSURE: 80 MMHG | OXYGEN SATURATION: 99 % | TEMPERATURE: 97.6 F | BODY MASS INDEX: 34.96 KG/M2 | HEART RATE: 70 BPM

## 2021-01-28 LAB
ABSOLUTE EOS #: <0.03 K/UL (ref 0–0.44)
ABSOLUTE IMMATURE GRANULOCYTE: 0.05 K/UL (ref 0–0.3)
ABSOLUTE LYMPH #: 2.73 K/UL (ref 1.1–3.7)
ABSOLUTE MONO #: 1.34 K/UL (ref 0.1–1.2)
ANION GAP SERPL CALCULATED.3IONS-SCNC: 9 MMOL/L (ref 9–17)
BASOPHILS # BLD: 0 % (ref 0–2)
BASOPHILS ABSOLUTE: <0.03 K/UL (ref 0–0.2)
BUN BLDV-MCNC: 9 MG/DL (ref 6–20)
BUN/CREAT BLD: ABNORMAL (ref 9–20)
CALCIUM SERPL-MCNC: 7.9 MG/DL (ref 8.6–10.4)
CHLORIDE BLD-SCNC: 112 MMOL/L (ref 98–107)
CO2: 18 MMOL/L (ref 20–31)
CREAT SERPL-MCNC: 0.67 MG/DL (ref 0.5–0.9)
DIFFERENTIAL TYPE: ABNORMAL
EKG ATRIAL RATE: 68 BPM
EKG P AXIS: 53 DEGREES
EKG P-R INTERVAL: 118 MS
EKG Q-T INTERVAL: 430 MS
EKG QRS DURATION: 84 MS
EKG QTC CALCULATION (BAZETT): 457 MS
EKG R AXIS: 20 DEGREES
EKG T AXIS: 40 DEGREES
EKG VENTRICULAR RATE: 68 BPM
EOSINOPHILS RELATIVE PERCENT: 0 % (ref 1–4)
GFR AFRICAN AMERICAN: >60 ML/MIN
GFR NON-AFRICAN AMERICAN: >60 ML/MIN
GFR SERPL CREATININE-BSD FRML MDRD: ABNORMAL ML/MIN/{1.73_M2}
GFR SERPL CREATININE-BSD FRML MDRD: ABNORMAL ML/MIN/{1.73_M2}
GLUCOSE BLD-MCNC: 102 MG/DL (ref 70–99)
HCT VFR BLD CALC: 29.7 % (ref 36.3–47.1)
HEMOGLOBIN: 9.6 G/DL (ref 11.9–15.1)
IMMATURE GRANULOCYTES: 0 %
LYMPHOCYTES # BLD: 20 % (ref 24–43)
MCH RBC QN AUTO: 29.3 PG (ref 25.2–33.5)
MCHC RBC AUTO-ENTMCNC: 32.3 G/DL (ref 28.4–34.8)
MCV RBC AUTO: 90.5 FL (ref 82.6–102.9)
MONOCYTES # BLD: 10 % (ref 3–12)
NRBC AUTOMATED: 0 PER 100 WBC
PDW BLD-RTO: 13.2 % (ref 11.8–14.4)
PLATELET # BLD: 281 K/UL (ref 138–453)
PLATELET ESTIMATE: ABNORMAL
PMV BLD AUTO: 10.3 FL (ref 8.1–13.5)
POTASSIUM SERPL-SCNC: 3.5 MMOL/L (ref 3.7–5.3)
RBC # BLD: 3.28 M/UL (ref 3.95–5.11)
RBC # BLD: ABNORMAL 10*6/UL
SEG NEUTROPHILS: 70 % (ref 36–65)
SEGMENTED NEUTROPHILS ABSOLUTE COUNT: 9.56 K/UL (ref 1.5–8.1)
SODIUM BLD-SCNC: 139 MMOL/L (ref 135–144)
TROPONIN INTERP: NORMAL
TROPONIN INTERP: NORMAL
TROPONIN T: NORMAL NG/ML
TROPONIN T: NORMAL NG/ML
TROPONIN, HIGH SENSITIVITY: <6 NG/L (ref 0–14)
TROPONIN, HIGH SENSITIVITY: <6 NG/L (ref 0–14)
WBC # BLD: 13.7 K/UL (ref 3.5–11.3)
WBC # BLD: ABNORMAL 10*3/UL

## 2021-01-28 PROCEDURE — 84484 ASSAY OF TROPONIN QUANT: CPT

## 2021-01-28 PROCEDURE — 80048 BASIC METABOLIC PNL TOTAL CA: CPT

## 2021-01-28 PROCEDURE — 99232 SBSQ HOSP IP/OBS MODERATE 35: CPT | Performed by: PSYCHIATRY & NEUROLOGY

## 2021-01-28 PROCEDURE — 93005 ELECTROCARDIOGRAM TRACING: CPT | Performed by: PSYCHIATRY & NEUROLOGY

## 2021-01-28 PROCEDURE — 99233 SBSQ HOSP IP/OBS HIGH 50: CPT | Performed by: PSYCHIATRY & NEUROLOGY

## 2021-01-28 PROCEDURE — 6370000000 HC RX 637 (ALT 250 FOR IP): Performed by: NURSE PRACTITIONER

## 2021-01-28 PROCEDURE — 6360000002 HC RX W HCPCS: Performed by: STUDENT IN AN ORGANIZED HEALTH CARE EDUCATION/TRAINING PROGRAM

## 2021-01-28 PROCEDURE — 6360000002 HC RX W HCPCS: Performed by: NURSE PRACTITIONER

## 2021-01-28 PROCEDURE — 85025 COMPLETE CBC W/AUTO DIFF WBC: CPT

## 2021-01-28 PROCEDURE — 97530 THERAPEUTIC ACTIVITIES: CPT

## 2021-01-28 PROCEDURE — 6370000000 HC RX 637 (ALT 250 FOR IP): Performed by: STUDENT IN AN ORGANIZED HEALTH CARE EDUCATION/TRAINING PROGRAM

## 2021-01-28 PROCEDURE — 2500000003 HC RX 250 WO HCPCS: Performed by: STUDENT IN AN ORGANIZED HEALTH CARE EDUCATION/TRAINING PROGRAM

## 2021-01-28 PROCEDURE — 97116 GAIT TRAINING THERAPY: CPT

## 2021-01-28 PROCEDURE — 36415 COLL VENOUS BLD VENIPUNCTURE: CPT

## 2021-01-28 PROCEDURE — 6370000000 HC RX 637 (ALT 250 FOR IP): Performed by: PSYCHIATRY & NEUROLOGY

## 2021-01-28 PROCEDURE — 97110 THERAPEUTIC EXERCISES: CPT

## 2021-01-28 PROCEDURE — 99254 IP/OBS CNSLTJ NEW/EST MOD 60: CPT | Performed by: PHYSICAL MEDICINE & REHABILITATION

## 2021-01-28 RX ORDER — AMLODIPINE BESYLATE 5 MG/1
5 TABLET ORAL DAILY
Qty: 21 TABLET | Refills: 0 | Status: SHIPPED | OUTPATIENT
Start: 2021-01-29 | End: 2021-02-09 | Stop reason: SDUPTHER

## 2021-01-28 RX ORDER — POTASSIUM CHLORIDE 20 MEQ/1
40 TABLET, EXTENDED RELEASE ORAL ONCE
Status: COMPLETED | OUTPATIENT
Start: 2021-01-28 | End: 2021-01-28

## 2021-01-28 RX ORDER — ASPIRIN 81 MG/1
81 TABLET ORAL DAILY
Qty: 21 TABLET | Refills: 0 | Status: SHIPPED | OUTPATIENT
Start: 2021-01-29 | End: 2021-02-18

## 2021-01-28 RX ORDER — CALCIUM GLUCONATE 20 MG/ML
2000 INJECTION, SOLUTION INTRAVENOUS ONCE
Status: COMPLETED | OUTPATIENT
Start: 2021-01-28 | End: 2021-01-28

## 2021-01-28 RX ORDER — AMLODIPINE BESYLATE 5 MG/1
5 TABLET ORAL DAILY
Status: DISCONTINUED | OUTPATIENT
Start: 2021-01-28 | End: 2021-01-28 | Stop reason: HOSPADM

## 2021-01-28 RX ADMIN — AMLODIPINE BESYLATE 5 MG: 5 TABLET ORAL at 11:19

## 2021-01-28 RX ADMIN — HEPARIN SODIUM 5000 UNITS: 5000 INJECTION INTRAVENOUS; SUBCUTANEOUS at 06:13

## 2021-01-28 RX ADMIN — CALCIUM GLUCONATE 2000 MG: 20 INJECTION, SOLUTION INTRAVENOUS at 11:20

## 2021-01-28 RX ADMIN — Medication 10 MG: at 03:08

## 2021-01-28 RX ADMIN — HEPARIN SODIUM 5000 UNITS: 5000 INJECTION INTRAVENOUS; SUBCUTANEOUS at 15:35

## 2021-01-28 RX ADMIN — POTASSIUM CHLORIDE 40 MEQ: 1500 TABLET, EXTENDED RELEASE ORAL at 11:20

## 2021-01-28 RX ADMIN — TICAGRELOR 90 MG: 90 TABLET ORAL at 08:40

## 2021-01-28 RX ADMIN — Medication 81 MG: at 08:40

## 2021-01-28 RX ADMIN — DOCUSATE SODIUM 50MG AND SENNOSIDES 8.6MG 1 TABLET: 8.6; 5 TABLET, FILM COATED ORAL at 08:40

## 2021-01-28 RX ADMIN — HYDRALAZINE HYDROCHLORIDE 10 MG: 20 INJECTION INTRAMUSCULAR; INTRAVENOUS at 11:51

## 2021-01-28 NOTE — CONSULTS
sitting EOB, completed UB sponge bath/oral hygiene/wash face/don/doff gown sitting in recliner         Balance  Sitting Balance: Moderate assistance(Mod A during unsupported static/dynamic sitting EOB, noted leaning towards R side. At times, req CGA/SBA for static sitting balance)  Standing Balance: Contact guard assistance   Standing Balance  Time: ~2 min  Comment: RW  Functional Mobility  Functional - Mobility Device: Rolling Walker  Activity: Other(bed to chair)  Assist Level: Contact guard assistance     Bed mobility  Rolling to Left: Moderate assistance  Supine to Sit: Moderate assistance, 2 Person assistance(progress BLE, trunk, hips)  Sit to Supine: Unable to assess  Scooting: Moderate assistance  Comment: EOB with OT upon arrival.  Transfers  Sit to stand: Contact guard assistance  Stand to sit: Contact guard assistance  Transfer Comments: CGA for balance d/t reported lightheadedness/dizziness upon initial supine>sit                 SLP:      Past Medical History:        Diagnosis Date    Carotid aneurysm, left ([address]     Migraines     Wellness examination     PCP Jessica Diana MD/jesusita/ last seen 6-2020       Past Surgical History:        Procedure Laterality Date    CARDIAC CATHETERIZATION  01/25/2021    IR ANGIOGRAM CAROTID CEREBRAL BILATERAL BRANDY SURPASS EVOLVE ANEURYSM EMBOLIZATION SYSTEM MRI CONDITIONAL 3T OK. SAFE IMMEDIATELY POST IMPLANT.  CARDIAC CATHETERIZATION      IR ANGIOGRAM CAROTID CEREBRAL BILATERAL    CARDIAC CATHETERIZATION  01/25/2021    IR ANGIOGRAM CAROTID CEREBRAL BILATERAL       Allergies: Allergies   Allergen Reactions    Amoxicillin Swelling     lips    Penicillins Other (See Comments)     Pt.  Unsure of reaction        Current Medications:   Current Facility-Administered Medications: amLODIPine (NORVASC) tablet 5 mg, 5 mg, Oral, Daily  hydrALAZINE (APRESOLINE) injection 10 mg, 10 mg, Intravenous, Q4H PRN  heparin (porcine) injection 5,000 Units, 5,000 Units, Subcutaneous, 3 times per day  sodium chloride flush 0.9 % injection 10 mL, 10 mL, Intravenous, PRN  acetaminophen (TYLENOL) tablet 650 mg, 650 mg, Oral, Q4H PRN  promethazine (PHENERGAN) tablet 12.5 mg, 12.5 mg, Oral, Q6H PRN **OR** ondansetron (ZOFRAN) injection 4 mg, 4 mg, Intravenous, Q6H PRN  aspirin EC tablet 81 mg, 81 mg, Oral, Daily  ticagrelor (BRILINTA) tablet 90 mg, 90 mg, Oral, BID  labetalol (NORMODYNE;TRANDATE) injection 10 mg, 10 mg, Intravenous, Q1H PRN  sodium chloride flush 0.9 % injection 10 mL, 10 mL, Intravenous, 2 times per day  sodium chloride flush 0.9 % injection 10 mL, 10 mL, Intravenous, PRN  promethazine (PHENERGAN) tablet 12.5 mg, 12.5 mg, Oral, Q6H PRN **OR** ondansetron (ZOFRAN) injection 4 mg, 4 mg, Intravenous, Q6H PRN  polyethylene glycol (GLYCOLAX) packet 17 g, 17 g, Oral, Daily PRN  acetaminophen (TYLENOL) tablet 650 mg, 650 mg, Oral, Q6H PRN **OR** acetaminophen (TYLENOL) suppository 650 mg, 650 mg, Rectal, Q6H PRN  sennosides-docusate sodium (SENOKOT-S) 8.6-50 MG tablet 1 tablet, 1 tablet, Oral, BID    Social History:  Lives With: Spouse, Son  Type of Home: House  Home Layout: Two level, Laundry in basement, Bed/Bath upstairs, Able to Live on Main level with bedroom/bathroom  Home Access: Stairs to enter without rails  Entrance Stairs - Number of Steps: 3-4  Bathroom Shower/Tub: Tub/Shower unit  Bathroom Toilet: Standard  Bathroom Equipment: (none)  Bathroom Accessibility: Accessible  Home Equipment: Standard walker  Receives Help From: Family  ADL Assistance: Independent  Homemaking Assistance: Independent  Homemaking Responsibilities: Yes(split.)  Ambulation Assistance: Independent  Transfer Assistance: Independent  Active : Yes  Mode of Transportation: Car, Truck, SUV  Occupation: Full time employment  Type of occupation: [employer]  Leisure & Hobbies: TV  Social History     Socioeconomic History    Marital status: Single     Spouse name: None    Number of children: None    Years of education: None    Highest education level: None   Occupational History    None   Social Needs    Financial resource strain: None    Food insecurity     Worry: None     Inability: None    Transportation needs     Medical: None     Non-medical: None   Tobacco Use    Smoking status: Never Smoker    Smokeless tobacco: Never Used   Substance and Sexual Activity    Alcohol use: Yes     Comment: 3 glasses weekly    Drug use: Not Currently    Sexual activity: Yes     Partners: Male   Lifestyle    Physical activity     Days per week: None     Minutes per session: None    Stress: None   Relationships    Social connections     Talks on phone: None     Gets together: None     Attends Scientology service: None     Active member of club or organization: None     Attends meetings of clubs or organizations: None     Relationship status: None    Intimate partner violence     Fear of current or ex partner: None     Emotionally abused: None     Physically abused: None     Forced sexual activity: None   Other Topics Concern    None   Social History Narrative    None       Family History:       Problem Relation Age of Onset    Kidney Disease Mother     Heart Disease Mother     Hypertension Mother     Migraines Mother     High Blood Pressure Mother     Heart Disease Father     Colon Cancer Father     Diabetes Father     Cancer Father         prostate    Heart Disease Brother        Diagnostics:    CT HEAD 1/26/21  CT OF THE HEAD WITHOUT CONTRAST  1/26/2021 3:32 am       TECHNIQUE:   CT of the head was performed without the administration of intravenous   contrast. Dose modulation, iterative reconstruction, and/or weight based   adjustment of the mA/kV was utilized to reduce the radiation dose to as low   as reasonably achievable.       COMPARISON:   None.       HISTORY:   ORDERING SYSTEM PROVIDED HISTORY: [employer]   TECHNOLOGIST PROVIDED HISTORY:   [employer]       Is the patient pregnant?->No   Reason for Exam: RUE weakness - Cerebral aneurysm rupture. Acuity: Acute   Type of Exam: Initial       FINDINGS:   BRAIN/VENTRICLES: There is no acute intracranial hemorrhage, mass effect or   midline shift.  No abnormal extra-axial fluid collection.  The gray-white   differentiation is maintained without evidence of an acute infarct.  There is   no evidence of hydrocephalus.       ORBITS: The visualized portion of the orbits demonstrate no acute abnormality.       SINUSES: The visualized paranasal sinuses and mastoid air cells demonstrate   no acute abnormality.       SOFT TISSUES/SKULL:  No acute abnormality of the visualized skull or soft   tissues.           Impression   No acute intracranial abnormality. MRI BRAIN 1/26/21  Scattered foci of acute infarctions in the left frontal, parietal, temporal   and occipital lobes and in the left basal ganglia/insula cortex.  Associated   local mass effect without midline shift.       Occlusion of the left internal carotid artery starting from is origin,   extending to the ICA terminus.  This finding is acute and new since January   15, 2021.       Remainder of the major arteries of the head and neck are patent without   flow-limiting stenosis.       The left MCA and left ROSEANN are likely supplied by patent anterior   communicating artery. CBC:   Recent Labs     01/26/21  0456 01/27/21  0532 01/28/21  0411   WBC 14.1* 17.9* 13.7*   RBC 4.04 3.76* 3.28*   HGB 11.7* 11.1* 9.6*   HCT 35.7* 34.3* 29.7*   MCV 88.4 91.2 90.5   RDW 12.7 13.2 13.2    291 281     BMP:   Recent Labs     01/26/21  0456 01/27/21  0532 01/28/21  0411    138 139   K 4.2 4.2 3.5*   * 112* 112*   CO2 16* 18* 18*   BUN 6 7 9   CREATININE 0.68 0.62 0.67   GLUCOSE 134* 132* 102*      HbA1c:   Lab Results   Component Value Date    LABA1C 5.5 02/07/2012     BNP: No results for input(s): BNP in the last 72 hours.   PT/INR: No results for input(s): PROTIME, INR in the last 72 hours. APTT: No results for input(s): APTT in the last 72 hours. CARDIAC ENZYMES:   Recent Labs     01/27/21  1047 01/27/21  1506   TROPONINT NOT REPORTED NOT REPORTED     FASTING LIPID PANEL:  Lab Results   Component Value Date    CHOL 201 (H) 05/17/2018    HDL 94 05/17/2018    TRIG 50 05/17/2018     LIVER PROFILE:   Recent Labs     01/26/21  0456   AST 9   ALT 6   BILITOT 0.18*   ALKPHOS 45          Physical Exam:  BP (!) 167/153   Pulse 75   Temp 97.6 °F (36.4 °C) (Oral)   Resp 14   Ht 5' 2\" (1.575 m)   Wt 190 lb (86.2 kg)   LMP 10/21/2020   SpO2 99%   BMI 34.75 kg/m²     GEN: Well developed, well nourished, no acute distress. HEENT: NCAT, PERRL, EOMI, mucous membranes pink and moist.  RESP: Lungs clear to auscultation. No rales or rhonchi. Respirations WNL and unlabored. CV: Regular rate rhythm. No murmurs, rubs, or gallops. ABD: soft, non-distended, non-tender. BS+ and equal.  NEURO: A&O x 3. Sensation intact to light touch. DTRs 2+. CNs III-XII grossly intact  MSK: Functional ROM. Muscle tone and bulk are normal bilaterally. Strength 5/5 key muscles LUE and LLE. RLE key muscles 4+/5. R shoulder and elbow flexion 4/5, R  1/5. EXT: No calf tenderness to palpation or edema BLEs. SKIN: Warm dry and intact with good turgor. PSYCH: Mood WNL. Affect WNL. Appropriately interactive. Impression:  1. Ischemic CVA: known ICA aneurysm. S/p mechanical thrombectomy and aneurysm stenting. On ASA, Brilinta  2. HTN: on amlodipine      Recommendations:    1. Diagnosis:  Ischemic CVA  2. Therapy: Has PT/OT needs  3. Medical Necessity: As above  4. Support: Lives with supportive spouse and son  5. Rehab Recommendation: Per  and patient her function has improved since therapy yesterday and they would like her to discharge home with home therapy. That is not yet reflected in her therapy notes.  Will review tomorrow as no therapy notes available for today, and if she has improved then she could follow with me in clinic in 3-4 weeks. 6. DVT Prophylaxis: on heparin    It was my pleasure to evaluate Arlen Mckinley today. Please call with questions. Snehal Martinez MD        This note is created with the assistance of a speech recognition program.  While intending to generate a document that actually reflects the content of the visit, the document can still have some errors including those of syntax and sound a like substitutions which may escape proof reading. In such instances, actual meaning can be extrapolated by contextual diversion.

## 2021-01-28 NOTE — PLAN OF CARE
Patient remained free from injury. Patient verbalized understanding of need for the safety precautions. Demonstrates proper use of assistive devices. Bed remains in the lowest position. Call light remains within reach. [place] Program in use. Neuro assessment completed, fall precautions in place, aspirations precautions in place, assess for barriers in communication and mobility, interventions to assist in communication and mobility in place, encouraged to call for assistance, adaptive devices used as needed, assess emotional state and support offered, encouraged patient to communicate by available means, and support systems included in patient care.

## 2021-01-28 NOTE — PROGRESS NOTES
Physical Therapy Daily Treatment Note    Date: 2020  Patient Name: Johann Castillo  : 1957   MRN: [id number]  DOInjury: 2020   DOSx:   Referring Provider:  Roxie Spence DO  [address] [facility]          Medical Diagnosis:   B53.876N (ICD-10-CM) - Rupture of anterior cruciate ligament of right knee, subsequent encounter     Outcome Measure:  Lower Extremity Functional Scale (LEFS) 79% impairment    S: pt reports very sore today . Not sure what he did ? O:   Time 9712-6580  am     Visit  Repeat outcome measure at mid point and end. Pain 6 /10     ROM  110 flex -5 ext   2020    Modalities       ice , compression , elevation  to R  Leg  X 15 min  post ex's     Manual            Stretch            Heel prop  TE   Exercise         TE         Recumbent bike  X 10 min level 5      x TE   x TE   x TE         Hamstring curls  2 x 20      Marching  2 x 20  x YE   Sidekicks  2 x 20    x TE   Calf raises  2 x 20            Knee ext machine  Not perf  2020         SAQ      LAQ 2 x 20     Hamstring Curl       TG squats L 17 3 x20 x    TG calf raises L 17 2 x 15      Step-ups - FWD Not perf 2020    Step-ups - LAT     Step-ups - BWD        NMR To improve balance for safe community and home ambulation    Resisted walk      FWD      BKWD      lat      March      Side stepping      Retro walk      Heel to toe      A:  Tolerated well . Not all ex's performed  Pt has a RTD today . Above added to written HEP.   P: Continue with rehab plan  Magi Dear, PTA    Treatment Charges: Mins Units   Initial Evaluation     Re-Evaluation     Ther Exercise         TE 20 1   Manual Therapy     MT     Ther Activities        TA     Gait Training          GT     Neuro Re-education NR     Modalities VD x 15  1   Non-Billable Service Time     Other     Total Time/Units 35 2 Pt discharged with all of her belongings and paperwork. All questions were answered. Pt was wheeled down to her car by writer with .

## 2021-01-28 NOTE — PROGRESS NOTES
Daily Progress Note  Neuro Critical Care    Patient Name: Diony Magdaleno  Patient : 1980  Room/Bed: 0530/0530-01  Code Status: FULL  Allergies: Allergies   Allergen Reactions    Amoxicillin Swelling     lips    Penicillins Other (See Comments)     Pt. Unsure of reaction       CHIEF COMPLAINT:     Elective aneurysm coiling      INTERVAL HISTORY    Initial Presentation (Admitted 2021): This is a 65-year-old female with a history of migraines who presented for elective aneurysm coiling. Patient follows with general neurology for headaches and was noted to have worsening headache behind her left eye. She had a CTA head and neck done which revealed a 5 mm left paraophthalmic ICA aneurysm. She was evaluated by endovascular neurosurgery and recommended coiling. Patient was placed on dual antiplatelet therapy of aspirin 325 mg and Plavix 75 mg daily. Hospital Course:   Patient was taken to the angio suite for cerebral angiogram.  Noted to have a left superior hypophyseal aneurysm. This was treated using a flow diverter. : Postprocedure, patient had concern of right upper extremity/hand weakness. Started on Decadron 4 mg every 8. no acute concerns overnight. This morning patient still had complaint of a headache and was more lethargic. Had stat MRI MRA done revealing  : MRI brain revealed scattered left MCA stroke in March occlusion of the ICA. Patient had emergent catheter thrombectomy of the left cavernous ICA patient improved mentally afterwards. Patient restarted on Brilinta. Last 24h:   No acute vents overnight. Patient is tolerating diet without difficulty. Afebrile. Hypertensive corn, fracture pressures been weaned. Patient feels better and using her right hand speaking better per . Patient denies any fevers, chills, chest pain, shortness breath or any other complaints. Patient has been notes that she wants to go home.       CURRENT MEDICATIONS: SCHEDULED MEDICATIONS:   amLODIPine  5 mg Oral Daily    heparin (porcine)  5,000 Units Subcutaneous 3 times per day    aspirin  81 mg Oral Daily    ticagrelor  90 mg Oral BID    sodium chloride flush  10 mL Intravenous 2 times per day    sennosides-docusate sodium  1 tablet Oral BID     CONTINUOUS INFUSIONS:    PRN MEDICATIONS:   hydrALAZINE, sodium chloride flush, acetaminophen, promethazine **OR** ondansetron, labetalol, sodium chloride flush, promethazine **OR** ondansetron, polyethylene glycol, acetaminophen **OR** acetaminophen    VITALS:  Temperature Range: Temp: 97.6 °F (36.4 °C) Temp  Av.8 °F (36.6 °C)  Min: 97.6 °F (36.4 °C)  Max: 98 °F (36.7 °C)  BP Range: Systolic (14LYR), OGX:612 , Min:115 , GNN:043     Diastolic (27BBJ), MCB:12, Min:69, Max:153    Pulse Range: Pulse  Av.2  Min: 51  Max: 88  Respiration Range: Resp  Av.8  Min: 0  Max: 21  Current Pulse Ox: SpO2: 99 %  24HR Pulse Ox Range: SpO2  Av.5 %  Min: 96 %  Max: 100 %  Patient Vitals for the past 12 hrs:   BP Pulse Resp SpO2   21 1703 (!) 153/80 70 (!) 1 99 %   21 1533 (!) 139/97 81 10 100 %   21 1507 (!) 152/85 76 (!) 4 99 %   21 1400 (!) 151/98 65 (!) 0 99 %   21 1300 (!) 149/89 76 12 100 %   21 1200 (!) 167/153 75 14 99 %   21 1100 (!) 161/94 81 21 100 %   21 1002 (!) 149/87 52 (!) 0 99 %   21 1000 (!) 154/81 56 (!) 0    21 0900 (!) 149/84 59 (!) 0 97 %   21 0802 (!) 151/82 55 (!) 0 97 %     Estimated body mass index is 34.75 kg/m² as calculated from the following:    Height as of this encounter: 5' 2\" (1.575 m).     Weight as of this encounter: 190 lb (86.2 kg).  []<16 Severe malnutrition  []1616.99 Moderate malnutrition  []1718.49 Mild malnutrition  []18.524.9 Normal  []2529.9 Overweight (not obese)  [x]3034.9 Obese class 1 (Low Risk)  []3539.9 Obese class 2 (Moderate Risk)  []?40 Obese class 3 (High Risk)    RECENT LABS:   Lab Results Component Value Date    WBC 13.7 (H) 01/28/2021    HGB 9.6 (L) 01/28/2021    HCT 29.7 (L) 01/28/2021     01/28/2021    CHOL 201 (H) 05/17/2018    TRIG 50 05/17/2018    HDL 94 05/17/2018    ALT 6 01/26/2021    AST 9 01/26/2021     01/28/2021    K 3.5 (L) 01/28/2021     (H) 01/28/2021    CREATININE 0.67 01/28/2021    BUN 9 01/28/2021    CO2 18 (L) 01/28/2021    TSH 1.77 05/17/2018    LABA1C 5.5 02/07/2012     24 HOUR INTAKE/OUTPUT:    Intake/Output Summary (Last 24 hours) at 1/28/2021 1903  Last data filed at 1/28/2021 1642  Gross per 24 hour   Intake 4006 ml   Output    Net 4006 ml       IMAGING:   CT Head 1/26/2021  Impression   No acute intracranial abnormality.         Labs and Images reviewed with:  [] Dr. Allen Cruz. Terence    [x] Dr. Carlos Arvizu  [] Dr. Jillian Hector  [] There are no new interval images to review. PHYSICAL EXAM       CONSTITUTIONAL:  Well developed, well nourished, alert and oriented x 3, in no acute distress. GCS 15. Nontoxic. No dysarthria. No aphasia. HEAD:  normocephalic, atraumatic    EYES:  PERRLA, EOMI.   ENT:  moist mucous membranes   NECK:  supple, symmetric   LUNGS:  Equal air entry bilaterally   CARDIOVASCULAR:  normal s1 / s2, RRR, distal pulses intact   ABDOMEN:  Soft, no rigidity   NEUROLOGIC:  Mental Status:  A & O x3,lethargic             Cranial Nerves:    cranial nerves II-XII are grossly intact    Motor Exam:    Drift:  absent  Tone:  Normal    RUE 4/5 LUE 5/5  RLE/LLE 5/5      Sensory:   Touch:    Right Upper Extremity:  normal  Left Upper Extremity:  normal  Right Lower Extremity:  normal  Left Lower Extremity:  normal    Coordination/Dysmetria:  Finger to Nose:   Right:  normal  Left:  normal       Gait:  Not tested     ASSESSMENT AND PLAN:       This is a 21-year-old female who presents for an elective procedure for her 5 mm left paraophthalmic ICA aneurysm.   // POD#3 s/p cerebral angiogram with flow diverter  // Chronic migraine // POD#2 s/p emergent angiogram with mechanical thrombectomy of L cavernous ICA occlusion. NEUROLOGIC:  - Imaging CT Head this morning with no acute abnormality  - Goal -150  - Neuro checks per protocol  - Continue ASA 81mg and Brilinta 90mg BID  - Decadron 4mg q8hrs for headache showed improvement. Discussed with EVNS and ok to stop for now and consider restarting should headache worsen. CARDIOVASCULAR:  - Goal -150   -wean off cleviprex   - Start amlodipine 5 mg daily  - Continue telemetry    PULMONARY:  - Doing well on RA    RENAL/FLUID/ELECTROLYTE:  - BUN 9/ Creatinine 0.67  - Urine output 0.8 mg/kg/hr  - Replace electrolytes PRN  - Daily BMP    GI/NUTRITION:  NUTRITION:  DIET CARB CONTROL; Carb Control: 4 carb choices (60 gms)/meal  - Bowel regimen: glycolax PRN  - GI prophylaxis: not indicated    ID:  - Afebrile  - WBC 13.7 likely secondary to steroids  - Continue to monitor for fevers  - Daily CBC    HEME:   - H&H 9.6/29.7  - Platelets 173  - Daily CBC    ENDOCRINE:  - Continue to monitor blood glucose, goal <180    OTHER:  - PT/OT/ST   - Code Status: FULL    PROPHYLAXIS:  Stress ulcer: not indicated    DVT PROPHYLAXIS:  - SCD sleeves - Thigh High     DISPOSITION:  [x] OK for out of ICU from Neuro Critical Care standpoint    We will continue to follow along. For any changes in exam or patient status please contact Neuro Critical Care.       Asim Ruano, DO   Neurology PGY-2  Neuro Critical Care  1/28/2021

## 2021-01-28 NOTE — PROGRESS NOTES
Talked to pt and  at bedside. They note they have a wheeled walker at home and do not wish to have one here. Patient has been also notes that he will be bathing her and does not wish to have any handgrips or any other assist device that occupational therapy recommended. Patient affirms this verbally and is clearly alert and oriented person place and time. Will discharge patient home with no recommended equipment.

## 2021-01-28 NOTE — PLAN OF CARE
Problem: Skin Integrity:  Goal: Will show no infection signs and symptoms  Description: Will show no infection signs and symptoms  1/28/2021 0602 by Michi Hu RN  Outcome: Ongoing  1/27/2021 1630 by Alan June RN  Outcome: Ongoing  Goal: Absence of new skin breakdown  Description: Absence of new skin breakdown  1/28/2021 0602 by Michi Hu RN  Outcome: Ongoing  1/27/2021 1630 by Alan June RN  Outcome: Ongoing     Problem: Falls - Risk of:  Goal: Will remain free from falls  Description: Will remain free from falls  1/28/2021 0602 by Michi Hu RN  Outcome: Ongoing  1/27/2021 1630 by Alan June RN  Outcome: Ongoing  Goal: Absence of physical injury  Description: Absence of physical injury  1/28/2021 0602 by Michi Hu RN  Outcome: Ongoing  1/27/2021 1630 by Alan June RN  Outcome: Ongoing     Problem: Pain:  Goal: Pain level will decrease  Description: Pain level will decrease  1/28/2021 0602 by Michi Hu RN  Outcome: Ongoing  1/27/2021 1630 by Alan June RN  Outcome: Ongoing  Goal: Control of acute pain  Description: Control of acute pain  1/28/2021 0602 by Michi Hu RN  Outcome: Ongoing  1/27/2021 1630 by Alan June RN  Outcome: Ongoing  Goal: Control of chronic pain  Description: Control of chronic pain  1/28/2021 0602 by Michi Hu RN  Outcome: Ongoing  1/27/2021 1630 by Alan June RN  Outcome: Ongoing     Problem: HEMODYNAMIC STATUS  Goal: Patient has stable vital signs and fluid balance  1/28/2021 0602 by Michi Hu RN  Outcome: Ongoing  1/27/2021 1630 by Alan June RN  Outcome: Ongoing     Problem: ACTIVITY INTOLERANCE/IMPAIRED MOBILITY  Goal: Mobility/activity is maintained at optimum level for patient  1/28/2021 0602 by Michi Hu RN  Outcome: Ongoing  1/27/2021 1630 by Alan June RN  Outcome: Ongoing     Problem: COMMUNICATION IMPAIRMENT Goal: Ability to express needs and understand communication  2021 0602 by Erika Jennnigs RN  Outcome: Ongoing  2021 1630 by Vernon Knapp RN  Outcome: Ongoing

## 2021-01-28 NOTE — PROGRESS NOTES
Endovascular Neurosurgery Progress Note    SUBJECTIVE:   MRI/A yesterday showed acute L MCA stroke with occlusion of the recently placed flow diverter. Exam deteriorated cutely, pt was brought for emergent thrombectomy. Pt's exam is improved, but still with residual aphasia and RUE weakness. Pt this am does not have any new acute complaints. Review of Systems:  CONSTITUTIONAL:  negative for fevers, chills, fatigue and malaise    EYES:  negative for double vision, blurred vision and photophobia     HEENT:  negative for tinnitus, epistaxis and sore throat    RESPIRATORY:  negative for cough, shortness of breath, wheezing    CARDIOVASCULAR:  negative for chest pain, palpitations, syncope, edema    GASTROINTESTINAL:  negative for nausea, vomiting    GENITOURINARY:  negative for incontinence    MUSCULOSKELETAL:  negative for neck or back pain    NEUROLOGICAL:  Negative for weakness and tingling  negative for headaches and dizziness    PSYCHIATRIC:  negative for anxiety      Review of systems otherwise negative. OBJECTIVE:     Vitals:    01/27/21 1831   BP: (!) 148/97   Pulse: 59   Resp: 17   Temp:    SpO2: 99%        General:  Gen: obese habitus, NAD  HEENT: NCAT, mucosa moist  Cvs: RRR, S1 S2 normal  Resp: symmetric unlabored breathing  Abd: s/nd/nt  Ext: no edema  Skin: no lesions seen, warm and dry. R groin tender, no hematoma. L groin sheath in place. Neuro:  Gen: awake, oriented x3. Lang/speech: mild aphasia, perseveration. no dysarthria. Follows commands. CN: PERRL, EOMI, VFF, V1-3 intact, face symmetric, hearing intact, shoulder shrug symmetric, tongue midline  Motor: L hemibody 5/5. R hand  3/5, R deltoid at least 3/5 no drift. RLE 5/5. Sense: LT intact in all 4 ext. Coord: FTN intact b/l  DTR: deferred  Gait: deferred    NIH Stroke Scale:   1a  Level of consciousness: 1 - not alert but arousable by minor stimulation to obey, answer or respond   1b.  LOC questions:  0 - answers both questions correctly   1c. LOC commands: 0 - performs both tasks correctly   2. Best Gaze: 0 - normal   3. Visual: 0 - no visual loss   4. Facial Palsy: 0 - normal symmetric movement   5a. Motor left arm: 0 - no drift, limb holds 90 (or 45) degrees for full 10 seconds   5b. Motor right arm: 0 - no drift, limb holds 90 (or 45) degrees for full 10 seconds   6a. Motor left le - no drift; leg holds 30 degree position for full 5 seconds   6b  Motor right le - no drift; leg holds 30 degree position for full 5 seconds   7. Limb Ataxia: 0 - absent   8. Sensory: 0 - normal; no sensory loss   9. Best Language:  1 - mild to moderate aphasia; some obvious loss of fluency or facility of comprehension without significant limitation on ideas expressed or form of expression. Reduction of speech and/or comprehension, however, makes conversation about provided materials difficult or impossible. For example, in conversation about provided materials, examiner can identify picture or naming card content from patient's response. 10. Dysarthria: 0 - normal   11. Extinction and Inattention: 0 - no abnormality         Total:   2     MRS: 1      LABS:   Reviewed. RADIOLOGY:   Images were personally reviewed including:  CT head 2021  Findings consistent with acute/subacute posterior left temporal infarct   laterally.       No intracranial hemorrhage identified. 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 --Successful intra-arterial left cavernous ICA thrombectomy using contact aspiration with ACE 68 and 3MAX (total 3 passes), followed by balloon angioplasty using Quantum apex 3 x 12 mm, followed by left cavernous ICA stenting using Neuroform [place] stent 4 x 24 mm. --The above-mentioned treatments resulted in successful recanalization with TICI 2C with distal left posterior parietal artery M3/M4 occlusion noted.   There is residual left cavernous ICA subocclusive thrombus noted.  --Intra-arterial cangrelor was given for left cavernous ICA flow diverter thrombosis (using 2.5 mg per KG over 8 minutes as a bolus dose). MRI/A head 1/26/2021  Scattered foci of acute infarctions in the left frontal, parietal, temporal   and occipital lobes and in the left basal ganglia/insula cortex.  Associated   local mass effect without midline shift.       Occlusion of the left internal carotid artery starting from is origin,   extending to the ICA terminus.  This finding is acute and new since January   15, 2021.       Remainder of the major arteries of the head and neck are patent without   flow-limiting stenosis.       The left MCA and left ROSEANN are likely supplied by patent anterior   communicating artery. DSA 1/25/2021  1. Left superior hypophyseal aneurysm projecting medially measuring approximately 5.15 x 4.2 x 4.5 mm with neck size at 2.78 mm. Proximal parent vessel diameter at 2.98 mm. 2. The above-mentioned aneurysm was successfully treated using flow diversion with surpass 3.25 x 15 mm. The flow diverter appears in good wall apposition. There is contrast stagnation noted in the aneurysm with [place] class at 3. No distal thromboembolic complications noted. ASSESSMENT:   42yo female with pmh of migraines, incidental L paraophthalmic ICA aneurysm. s/p aneurysm embo with surpass evolve FD device 1/25/2021. Post procedure pt had mild RUE weakness. CT head overnight neg for stroke    1/26/2021 am pt has new speech issues and perseveration, confusion. MRI/A showed acute L MCA stroke and occlusion of the FD device. 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 S/p MT, angioplasty, stenting of the L ICA tici2c. cangrelor started with transition to brilinta. Pt has residual mixed aphasia and RUE weakness. Repeat CT head 1/26/2021 no bleed. PLAN:   --care as per nsicu  --plan to remove sheath this AM.  --ok to stop decadron, headache improved.   --continue brilinta 90 bid and asa

## 2021-01-28 NOTE — PROGRESS NOTES
Physical Therapy  Facility/Department: [address]  Daily Treatment Note  NAME: Brandon Tinsley  : 1980  MRN: [id number]    Date of Service: 2021    Discharge Recommendations:  Patient would benefit from continued therapy after discharge    Assessment   Body structures, Functions, Activity limitations: Decreased functional mobility ; Decreased strength;Decreased endurance;Decreased balance  Assessment: demo great progress with mobility and transfer this date requiring over all CGA , still limited in balance  and edndurance. demo some weakness in Right side, The pt would continue to benefit from More therapy to address deficits  Prognosis: Good  PT Education: Plan of Care;PT Role;General Safety;Home Exercise Program;Gait Training;Transfer Training;Functional Mobility Training;Orientation  REQUIRES PT FOLLOW UP: Yes  Activity Tolerance  Activity Tolerance: Patient Tolerated treatment well;Patient limited by fatigue;Patient limited by endurance     Patient Diagnosis(es): Diagnoses of Cerebral aneurysm and Aneurysm ([address] were pertinent to this visit. has a past medical history of Carotid aneurysm, left ([address] Migraines, and Wellness examination. has a past surgical history that includes Cardiac catheterization (2021); Cardiac catheterization; and Cardiac catheterization (2021). Restrictions  Restrictions/Precautions  Restrictions/Precautions: Fall Risk, General Precautions  Required Braces or Orthoses?: No  Position Activity Restriction  Other position/activity restrictions: s/p cerebral angio with thrombectomy  and aneurysm embolization   Subjective   General  Response To Previous Treatment: Patient with no complaints from previous session. Family / Caregiver Present: Yes()  Subjective  Subjective: RN and pt agreeable to PT.  Pt alert in bed upon arrival .  Pain Screening  Patient Currently in Pain: Denies  Vital Signs  Patient Currently in Pain: Denies       Orientation Orientation  Overall Orientation Status: Within Functional Limits  Cognition      Objective   Bed mobility  Supine to Sit: Contact guard assistance  Sit to Supine: Contact guard assistance  Scooting: Stand by assistance  Transfers  Sit to Stand: Contact guard assistance  Stand to sit: Contact guard assistance  Comment: Transfer with RW  Ambulation  Ambulation?: Yes  Ambulation 1  Surface: level tile  Device: No Device(IV pole push)  Assistance: Contact guard assistance  Gait Deviations: Slow Karli;Decreased step length;Decreased step height  Distance: 50ft and 60ft x2  Comments: Pt very pleased with progress and motivated to return to PLOF. Stairs/Curb  Stairs?: Yes  Stairs  # Steps : 3  Stairs Height: 6\"  Rails: Left ascending  Assistance: Minimal assistance  Comment: Difficulty lifting right leg to hieght of step. limited by IV line. Balance  Posture: Fair  Sitting - Static: Good  Sitting - Dynamic: Good;-  Standing - Static: Fair;-  Standing - Dynamic: Fair;-  Comments: standing assessed without AD. Exercises  Comments:Standing exercise program: Heel/toe raises, hip flexion, hip abduction, mini squats, hip extension, and hamstring curls. Reps:  X10,   Upper extremity exercises: Bicep curl, shoulder flexion/extension, punches, tricep curl, shoulder abduction/adduction.  Reps: x10      Goals  Short term goals  Time Frame for Short term goals: 14 visits  Short term goal 1: Pt will be Ave bed mobility  Short term goal 2: Pt will be Ave transfers  Short term goal 3: Pt will be Ave amb 240' RW or least restrictive AD  Short term goal 4: Pt will navigate 5 steps Ave    Plan    Plan  Times per week: 6-7x/wk  Current Treatment Recommendations: Strengthening, Balance Training, Functional Mobility Training, Transfer Training, Gait Training, Endurance Training, Home Exercise Program, Safety Education & Training, Patient/Caregiver Education & Training, Equipment Evaluation, Education, & procurement, Stair training Safety Devices  Type of devices: Nurse notified, Gait belt, Patient at risk for falls, All fall risk precautions in place, Left in bed  Restraints  Initially in place: No     Therapy Time   Individual Concurrent Group Co-treatment   Time In 1430         Time Out 1509         Minutes 39         Timed Code Treatment Minutes: WOJCIECH Snyder

## 2021-01-28 NOTE — PROGRESS NOTES
Endovascular Neurosurgery Progress Note    SUBJECTIVE:   No reported events overnight. This am pt feels somewhat better, and reports that her speech ins improved. Her headaches are improved. Review of Systems:  CONSTITUTIONAL:  negative for fevers, chills, fatigue and malaise    EYES:  negative for double vision, blurred vision and photophobia     HEENT:  negative for tinnitus, epistaxis and sore throat    RESPIRATORY:  negative for cough, shortness of breath, wheezing    CARDIOVASCULAR:  negative for chest pain, palpitations, syncope, edema    GASTROINTESTINAL:  negative for nausea, vomiting    GENITOURINARY:  negative for incontinence    MUSCULOSKELETAL:  negative for neck or back pain    NEUROLOGICAL:  Negative for weakness and tingling  negative for headaches and dizziness    PSYCHIATRIC:  negative for anxiety      Review of systems otherwise negative. OBJECTIVE:     Vitals:    01/28/21 1200   BP: (!) 167/153   Pulse: 75   Resp: 14   Temp:    SpO2: 99%        General:  Gen: obese habitus, NAD  HEENT: NCAT, mucosa moist  Cvs: RRR, S1 S2 normal  Resp: symmetric unlabored breathing  Abd: s/nd/nt  Ext: no edema  Skin: no lesions seen, warm and dry. R groin tender, no hematoma. L groin tender, no hematoma    Neuro:  Gen: awake, oriented x3. Lang/speech: no aphasia. no dysarthria. Follows commands. CN: PERRL, EOMI, VFF, V1-3 intact, face symmetric, hearing intact, shoulder shrug symmetric, tongue midline  Motor: L hemibody 5/5. R hand  3/5, R deltoid at least 4/5 no drift. RLE 5/5. Sense: LT intact in all 4 ext. Coord: FTN intact b/l  DTR: deferred  Gait: deferred    NIH Stroke Scale:   1a  Level of consciousness: 0 - alert; keenly responsive   1b. LOC questions:  0 - answers both questions correctly   1c. LOC commands: 0 - performs both tasks correctly   2. Best Gaze: 0 - normal   3. Visual: 0 - no visual loss   4. Facial Palsy: 0 - normal symmetric movement   5a.  Motor left arm: 0 - no drift, limb holds 90 (or 45) degrees for full 10 seconds   5b. Motor right arm: 0 - no drift, limb holds 90 (or 45) degrees for full 10 seconds   6a. Motor left le - no drift; leg holds 30 degree position for full 5 seconds   6b  Motor right le - no drift; leg holds 30 degree position for full 5 seconds   7. Limb Ataxia: 0 - absent   8. Sensory: 0 - normal; no sensory loss   9. Best Language:  0   10. Dysarthria: 0 - normal   11. Extinction and Inattention: 0 - no abnormality         Total:   0     MRS: 1      LABS:   Reviewed. RADIOLOGY:   Images were personally reviewed including:  CT head 2021  Findings consistent with acute/subacute posterior left temporal infarct   laterally.       No intracranial hemorrhage identified. 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 --Successful intra-arterial left cavernous ICA thrombectomy using contact aspiration with ACE 68 and 3MAX (total 3 passes), followed by balloon angioplasty using Quantum apex 3 x 12 mm, followed by left cavernous ICA stenting using Neuroform [place] stent 4 x 24 mm. --The above-mentioned treatments resulted in successful recanalization with TICI 2C with distal left posterior parietal artery M3/M4 occlusion noted. There is residual left cavernous ICA subocclusive thrombus noted. --Intra-arterial cangrelor was given for left cavernous ICA flow diverter thrombosis (using 2.5 mg per KG over 8 minutes as a bolus dose).     MRI/A head 2021  Scattered foci of acute infarctions in the left frontal, parietal, temporal   and occipital lobes and in the left basal ganglia/insula cortex.  Associated   local mass effect without midline shift.       Occlusion of the left internal carotid artery starting from is origin,   extending to the ICA terminus.  This finding is acute and new since January   15, 2021.       Remainder of the major arteries of the head and neck are patent without   flow-limiting stenosis.       The left MCA and left ROSEANN are likely supplied by patent anterior   communicating artery. DSA 1/25/2021  1. Left superior hypophyseal aneurysm projecting medially measuring approximately 5.15 x 4.2 x 4.5 mm with neck size at 2.78 mm. Proximal parent vessel diameter at 2.98 mm. 2. The above-mentioned aneurysm was successfully treated using flow diversion with surpass 3.25 x 15 mm. The flow diverter appears in good wall apposition. There is contrast stagnation noted in the aneurysm with Naren [place] class at 3. No distal thromboembolic complications noted. ASSESSMENT:   40yo female with pmh of migraines, incidental L paraophthalmic ICA aneurysm. s/p aneurysm embo with surpass evolve FD device 1/25/2021. Post procedure pt had mild RUE weakness. CT head overnight neg for stroke    1/26/2021 am pt has new speech issues and perseveration, confusion. MRI/A showed acute L MCA stroke and occlusion of the FD device. 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 S/p MT, angioplasty, stenting of the L ICA tici2c. cangrelor started with transition to brilinta. Pt has residual mixed aphasia and RUE weakness. Repeat CT head 1/26/2021 no bleed. PLAN:   --care as per nsicu  --continue brilinta 90 bid and asa 81  ---150  --Upon discharge follow up with Dr. Steve Castellanos in 2 weeks and Dr. Ivan Abrams in 3 months with MRA head with and without contrast.    Case discussed with Dr. Ivan Abrams attending.     Albert Arora MD, PhD    Stroke, [facility]  [facility]  Electronically signed 1/28/2021 at 3:36 PM

## 2021-01-28 NOTE — DISCHARGE SUMMARY
Neuro Critical Care   Discharge Summary      PATIENT NAME: Lorraine Barnes  YOB: 1980  MEDICAL RECORD NO. 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.       Lizzie Ross is a 38 y.o. 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 After coiling, patient was placed on dual antiplatelet therapy of aspirin and Plavix. As patient was, she did have some right-sided weakness. Repeat CT head was negative for any concerning findings. Patient resents her on Decadron 4 mg every 8 hours neurologic problems. Following morning patient had headache and lethargic and had trouble speaking. Stat MRI/MRA done and revealed patient had scattered left MCA stroke and occlusion of the ICA. Patient had an emergent catheter thrombectomy left cavernous ICA and patient improved. Patient was then started on Brilinta and Plavix. On 1/28 patient was found to be acting completely appropriately only had minor weakness of the right hand characterizes 4/5 strength. Patient had no aphasia or dysarthria. Patient and  wish to go home instead of taking her to acute rehab. Labs and imaging were followed daily. At time of discharge, Lorraine Barnes was tolerating a DIET CARB CONTROL; Carb Control: 4 carb choices (60 gms)/meal, having bowel movements, and is in stable condition to be discharged to home.         PROCEDURES:    Elective aneurysm stent/coiling and thrombectomy    PHYSICAL EXAMINATION        Discharge Vitals:  height is 5' 2\" (1.575 m) and weight is 190 lb (86.2 kg). Her oral temperature is 97.6 °F (36.4 °C). Her blood pressure is 153/80 (abnormal) and her pulse is 70. Her respiration is 1 (abnormal) and oxygen saturation is 99%. CONSTITUTIONAL:  Well developed, well nourished, alert and oriented x 3, in no acute distress. GCS 15. Nontoxic. No dysarthria. No aphasia. HEAD:  normocephalic, atraumatic    EYES:  PERRLA, EOMI.   ENT:  moist mucous membranes   NECK:  supple, symmetric   LUNGS:  Equal air entry bilaterally   CARDIOVASCULAR:  normal s1 / s2, RRR, distal pulses intact   ABDOMEN:  Soft, no rigidity   NEUROLOGIC:  Mental Status:  A & O x3,lethargic             Cranial Nerves:    cranial nerves II-XII are grossly intact     Motor Exam:    Drift:  absent  Tone:  Normal     RUE 4/5 LUE 5/5  RLE/LLE 5/5        Sensory:   Touch:    Right Upper Extremity:  normal  Left Upper Extremity:  normal  Right Lower Extremity:  normal  Left Lower Extremity:  normal     Coordination/Dysmetria:  Finger to Nose:   Right:  normal  Left:  normal         Gait:  Not tested         LABS/IMAGING     Recent Labs     01/26/21  0456 01/27/21  0532 01/28/21  0411   WBC 14.1* 17.9* 13.7*   HGB 11.7* 11.1* 9.6*   HCT 35.7* 34.3* 29.7*    291 281    138 139   K 4.2 4.2 3.5*   * 112* 112*   CO2 16* 18* 18*   BUN 6 7 9   CREATININE 0.68 0.62 0.67       Ct Head Wo Contrast    Result Date: 1/26/2021  EXAMINATION: CT OF THE HEAD WITHOUT CONTRAST  1/26/2021 11:28 pm TECHNIQUE: CT of the head was performed without the administration of intravenous contrast. Dose modulation, iterative reconstruction, and/or weight based adjustment of the mA/kV was utilized to reduce the radiation dose to as low as reasonably achievable. COMPARISON: None.  HISTORY: ORDERING SYSTEM PROVIDED HISTORY: post procedural TECHNOLOGIST PROVIDED HISTORY: post procedural Is the patient pregnant?->No Reason for Exam: Post procedural - Cardiac catheterization. Acuity: Acute Type of Exam: Initial FINDINGS: BRAIN/VENTRICLES: There is no acute intracranial hemorrhage, mass effect or midline shift. No abnormal extra-axial fluid collection. Corresponding to the largest area of acute/subacute infarct noted on the limited brain MRI performed 9 hours earlier, there is subtle hypodensity across the gray-white junction in the posterior left temporal lobe. The brain otherwise appears normal at this time. .  There is no evidence of hydrocephalus. ORBITS: The visualized portion of the orbits demonstrate no acute abnormality. SINUSES: The visualized paranasal sinuses and mastoid air cells demonstrate no acute abnormality. SOFT TISSUES/SKULL:  No acute abnormality of the visualized skull or soft tissues. Findings consistent with acute/subacute posterior left temporal infarct laterally. No intracranial hemorrhage identified. Ct Head Wo Contrast    Result Date: 1/26/2021  EXAMINATION: CT OF THE HEAD WITHOUT CONTRAST  1/26/2021 3:32 am TECHNIQUE: CT of the head was performed without the administration of intravenous contrast. Dose modulation, iterative reconstruction, and/or weight based adjustment of the mA/kV was utilized to reduce the radiation dose to as low as reasonably achievable. COMPARISON: None. HISTORY: ORDERING SYSTEM PROVIDED HISTORY: RUE weakness TECHNOLOGIST PROVIDED HISTORY: RUE weakness Is the patient pregnant?->No Reason for Exam: RUE weakness - Cerebral aneurysm rupture. Acuity: Acute Type of Exam: Initial FINDINGS: BRAIN/VENTRICLES: There is no acute intracranial hemorrhage, mass effect or midline shift. No abnormal extra-axial fluid collection. The gray-white differentiation is maintained without evidence of an acute infarct. There is no evidence of hydrocephalus. ORBITS: The visualized portion of the orbits demonstrate no acute abnormality.  SINUSES: The visualized paranasal sinuses and mastoid air cells demonstrate no acute abnormality. SOFT TISSUES/SKULL:  No acute abnormality of the visualized skull or soft tissues. No acute intracranial abnormality. Ir Angiogram Carotid C Erebral Bilateral    Result Date: 1/28/2021  Date of Service: 1/27/2021 Diagnosis:Acute Ischemic Stroke Procedure:Mechanical Thrombectomy for Acute left cavernous ICA clot/thrombosis Patient arrived to the angio suite at: 3:54 PM Vascular access was removed at: 7:15 PM [place]: Felicia Coto MD. Maxime Luevano MD PhD Contrast: 140 cc Of Visipaque-270 Fluoroscopy time: 66.5 minutes Procedures: 1. Trans-arterial endovascular mechanical thrombectomy using contact aspiration with reperfusion catheter with Combined Suctioning from the balloon guide catheter under flow arrest for acute left cavernous ICA clot/thrombosis 2. Superselective left MCA [id number] catheter 4. Left Common Carotid Artery Angiogram 5. Left internal carotid artery stent placement 6. Left Common Femoral Artery Angiogram 7. Left cavernous ICA intracranial stenting 8. Left cavernous ICA intracranial balloon angioplasty Neurointerventionalist:  Access: Left Common Femoral Artery Anesthesia Indication and Clinical Hx: This is a 44-year-old female who is -American with past medical history including migraine headache who presented for elective left superior hypophyseal aneurysm treatment with a flow diversion. She developed speech difficulty and underwent MRI brain/MRA showing left cervical ICA occlusion. MRI of the left MCA watershed ischemic stroke. She was referred for emergent cerebral angiogram with possible thrombectomy/intervention. Consent: after explaining the risks and benefit to the family, including but not limited to vessel injury or perforation, stroke, intracranial hemorrhage, radiation, dye allergic reaction, groin hematoma and death, an informed consent was obtained from her .  Technique and Interpretations: The patient was brought to the interventional suite and placed in a supine position by the stroke and anesthesia team. The patient prepped and draped under sterile technique and the left CFA was accessed under FL guidance using an 8F introducer sheath (using Seldinger technique). Left CCA Technique:Subsequently, the left common carotid artery (CCA) was selectively catheterized under fluoroscopic guidance and over the guide wire using the [facility] and [employer] select catheter. Images were obtained in standard biplane projections. Left CCA Interpretation: The left common carotid artery injection demonstrates antegrade flow into the external and proximal left carotid bulb. There is a stump pseudo occlusion noted at the left carotid bulb with contrast filling into the left cavernous  ICA segment after prolonged angiogram run. There is collateral flow noted from the IMAX with retrograde flow into left ophthalmic artery and ophthalmic ICA segment/left MCA. Left ICA technique: The left internal carotid artery was then selectively catheterized, and stump angiogram was performed with digital subtraction angiography of the intracranial left internal carotid circulation was performed in frontal, and lateral projections. Left ICA Interpretation: he left ICA images demonstrated left cavernous ICA segment occlusion after prolonged angiogram run. MECHANICAL THROMBECTOMY OF Left cavernous ICA CLOT:(First attempt) Under fluoroscopic guidance the left cervical ICA and left cavernous ICA was gently crossed using Synchro2 microwire and SL-10 microcatheter out into left MCA M1. The micro-wire was removed and the micro-catheter was flushed slowly. Then the ACE68 reperfusion catheter was advanced through the balloon guide catheter into left cavernous ICA segment just proximal to the clot. ACE68 reperfusion catheter was connected to a penumbra aspiration pump.  The balloon guide catheter was parked and inflated in the distal left cervical ICA segment and inflated. Then the ACE68 reperfusion catheter was taken slowly under fluoroscopic guidance into the BGC with negative pressure from the BGC port using 60 cc syringe. The BGC was double flushed and the balloon was deflated. Post first pass run demonstrated recanalization (TICI 2B) from (TICI 0). There is residual underlying filling defect/clot noted in the left cavernous ICA segment in the PED. MECHANICAL THROMBECTOMY OF Left cavernous CLOT:(Second attempt) Under fluoroscopic guidance the left cervical ICA and left cavernous ICA was gently crossed using "[employer] black 14 microwire and SL-10  microcatheter (after multiple attempts with Synchro 2 microwire and Rebar microcatheter) out into left MCA M1. The micro-wire was removed and the micro-catheter was flushed slowly. Then the 3MAX reperfusion catheter was advanced through the ACE68 reperfusion catheter was advanced through the balloon guide catheter into left cavernous ICA segment just proximal to the clot. ACE68 reperfusion catheter was connected to a penumbra aspiration pump and was parked the proximal aspect of the pipeline embolization device. The 3MAX reperfusion catheter was advanced into the left MCA M1. The balloon guide catheter was parked and inflated in the distal left cervical ICA segment and inflated. Then the ACE68/3MAX reperfusion catheter was taken slowly under fluoroscopic guidance into the BGC with negative pressure from the BGC port using 60 cc syringe and negative pressure with 60 cc syringe in the 3MAX. The BGC was double flushed and the balloon was deflated. Post second pass run demonstrated recanalization (TICI 2B) from (TICI 2B). There is residual underlying filling defect/clot noted in the left cavernous ICA segment in the PED.  MECHANICAL THROMBECTOMY OF Left cavernous CLOT:(Third attempt) Under fluoroscopic guidance the left cervical ICA and left cavernous ICA was gently crossed using [employer] black 14 microwire and SL-10  microcatheter (after multiple attempts with Synchro 2 microwire) out into left MCA M1. The micro-wire was removed and the  micro-catheter was flushed slowly. Then the 3MAX reperfusion catheter was advanced through the ACE68 reperfusion catheter was advanced through the balloon guide catheter into left cavernous ICA segment just proximal to the clot. ACE68 reperfusion catheter was connected to a penumbra aspiration pump and was parked the proximal aspect of the pipeline embolization device. The 3MAX reperfusion catheter was advanced into the left MCA M1. The balloon guide catheter was parked and inflated in the distal left cervical ICA segment and inflated. Then the ACE68/3MAX reperfusion catheter was taken slowly under fluoroscopic guidance into the BGC with negative pressure from the BGC port using 60 cc syringe and negative pressure with 60 cc syringe in the 3MAX. The BGC was double flushed and the balloon was deflated. Post third pass run demonstrated recanalization (TICI 2B) from (TICI 2B). There is residual underlying filling defect/clot noted in the left cavernous ICA segment in the PED. Subsequently the decision was to proceed for balloon angioplasty and intracranial stenting. Intracranial balloon angioplasty with intracranial stenting: NC Quantum Estelline 3 mm x 12 mm balloon was advanced over Synchro2 300 cm exchange microcatheter guidewire (as monorail). Subsequently the balloon was advanced under fluoroscopic guidance across the pipeline embolization device. The balloon was inflated and  deflated slowly under fluoroscopy guidance up to 8 KEYA (intracranial balloon angioplasty X 3). Follow-up angiogram and revealed residual clot in the pipeline embolization device. Subsequently the balloon was removed and the SL 10 microcatheter was advanced over the 300 cm Synchro 2 exchange microguidewire.  Then  Neuroform [place] 4 x 24 mm was advanced and successfully deployed under fluoroscopic guidance across the proximal left cavernous ICA into the prior placed PED. The Chadwick Neuroform stent appears with good wall opposition. Cangrelor 2.5 mcg per KG was given as a bolus dose over 8 minutes followed by maintenance dose. The above mentioned treatment resulted in TICI 2C. The right common femoral artery was then evaluated via femoral angiogram via the 8 [place] introducer sheath for possible use of closure device. 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 --Successful intra-arterial left cavernous ICA thrombectomy using contact aspiration?with ACE 68 and 3MAX (total 3 passes),?followed by balloon angioplasty using Quantum apex? 3?x 12?mm,?followed by left cavernous ICA stenting using Neuroform Chadwick stent 4 x 24 mm. --The above-mentioned treatments resulted in successful recanalization with TICI 2C with distal left posterior parietal artery M3/M4 occlusion noted. ? There is residual left cavernous ICA subocclusive thrombus noted. --Intra-arterial cangrelor was given for left cavernous ICA flow diverter thrombosis? (using 2.5 mg per KG over 8 minutes as a bolus dose followed by maintenance dose). Dr Peng Mallory dictated this invasive procedure. Manuelito Ugarte was present for all procedural and imaging components of this case. Examination was reviewed and reported findings confirmed and evaluated by Amina Ibanez Head Neck W Contrast    Result Date: 1/15/2021  EXAMINATION: CTA OF THE HEAD AND NECK WITH CONTRAST 1/15/2021 10:55 am: TECHNIQUE: CTA of the head and neck was performed with the administration of intravenous contrast. Multiplanar reformatted images are provided for review. MIP images are provided for review. Stenosis of the internal carotid arteries measured using NASCET criteria.  Dose modulation, iterative reconstruction, and/or weight based adjustment of the mA/kV was utilized to reduce the radiation dose to as low as reasonably achievable. Noncontrast CT of the head with reconstructed 2-D images are also provided for review. COMPARISON: None. HISTORY: ORDERING SYSTEM PROVIDED HISTORY: Intractable migraine without aura and with status migrainosus TECHNOLOGIST PROVIDED HISTORY: severe headache for 5 days with pulsation behind left eye Reason for Exam: migraine Acuity: Acute Type of Exam: Initial FINDINGS: CT HEAD: BRAIN/VENTRICLES:  No acute intracranial hemorrhage or extraaxial fluid collection. Grey-white differentiation is maintained. No evidence of mass, mass effect or midline shift. No evidence of hydrocephalus. ORBITS: The visualized portion of the orbits demonstrate no acute abnormality. SINUSES:  Minimal scattered mucosal thickening of the paranasal sinuses. The mastoid air cells demonstrate no acute abnormality. SOFT TISSUES/SKULL: No acute abnormality of the visualized skull or soft tissues. CTA NECK: AORTIC ARCH/ARCH VESSELS: No dissection or arterial injury. No significant stenosis of the brachiocephalic or subclavian arteries. CAROTID ARTERIES: No dissection, arterial injury, or hemodynamically significant stenosis by NASCET criteria. VERTEBRAL ARTERIES: No dissection, arterial injury, or significant stenosis. SOFT TISSUES: No focal consolidation within the visualized lung apices. No acute abnormality within the visualized superior mediastinum. BONES: No acute osseous abnormality. CTA HEAD: ANTERIOR CIRCULATION: No significant stenosis of the intracranial internal carotid, anterior cerebral, or middle cerebral arteries. No aneurysm. There is a 5 mm saccular aneurysm arising in the region of the left paraophthalmic ICA (series 5, image 123). POSTERIOR CIRCULATION: No significant stenosis of the vertebral, basilar, or posterior cerebral arteries. OTHER: No dural venous sinus thrombosis on this non-dedicated study     1. No acute intracranial abnormality.  2. Saccular 5 mm aneurysm arising in the region of the left paraophthalmic ICA. 3. Otherwise, unremarkable CTA of the head and neck. These results were sent to the [facility] [id number] ([address] [place] [address]) on 1/15/2021 at 11:52 am to be communicated to the referring/covering health care provider/office. Mra Neck W Wo Contrast    Result Date: 1/26/2021  EXAMINATION: MRI OF THE BRAIN WITHOUT CONTRAST; MRA OF THE NECK WITH AND WITHOUT CONTRAST; MRA OF THE HEAD WITH AND WITHOUT CONTRAST 1/26/2021 2:19 pm: TECHNIQUE: Multiplanar multisequence MRI of the brain was performed without the administration of intravenous contrast.; Multiplanar multisequence MRA of the neck was performed with and without the administration of intravenous contrast. Stenosis of the internal carotid arteries measured using NASCET criteria. Maximum intensity projection images were reviewed.; Multiplanar multisequence MRA of the head was performed with and without the administration of intravenous contrast.  Maximum intensity projection images were reviewed. COMPARISON: None.  CTA neck January 15, 2020, CT scan earlier today HISTORY: ORDERING SYSTEM PROVIDED HISTORY: new onset aphasia s/p left paraopthalmic ICA aneurysm coiling TECHNOLOGIST PROVIDED HISTORY: new onset aphasia s/p left paraopthalmic ICA aneurysm coiling Is the patient pregnant?->No Reason for Exam: new onset aphasia s/p lt paraopthalmic ICA aneurysm coiling; ORDERING SYSTEM PROVIDED HISTORY: acute onset aphasia s/p treatment of left paraopthalmic ICA aneurysm treatment TECHNOLOGIST PROVIDED HISTORY: acute onset aphasia s/p treatment of left paraopthalmic ICA aneurysm treatment Is the patient pregnant?->No Reason for Exam: new onset aphasia s/p lt paraopthalmic ICA aneurysm coiling; ORDERING SYSTEM PROVIDED HISTORY: acute onset aphasia s/p left paraopthalmic aneurysm treatment TECHNOLOGIST PROVIDED HISTORY: acute onset aphasia s/p left paraopthalmic aneurysm treatment Is the patient pregnant?->No Reason for Exam: new onset aphasia s/p lt paraopthalmic ICA aneurysm coiling FINDINGS: MRI BRAIN: INTRACRANIAL STRUCTURES/VENTRICLES: There are scattered foci of acute infarctions in the left frontal, parietal, temporal and occipital lobes and in the left basal ganglia/insula cortex. There is associated local mass effect without midline shift. No evidence of an acute intracranial hemorrhage. No hydrocephalus. There is increased normal flow void in the left internal carotid artery, likely related to occlusion. MRA NECK: AORTIC ARCH/ARCH VESSELS: No dissection or arterial injury. No significant stenosis of the brachiocephalic or subclavian arteries. CAROTID ARTERIES: There is occlusion of the left internal carotid artery starting from is origin, extending to the ICA terminus. The right internal carotid artery and the right common carotid arteries are within normal limits without flow-limiting stenosis by NASCET criteria. VERTEBRAL ARTERIES: No dissection, arterial injury, or significant stenosis. MRA HEAD: ANTERIOR CIRCULATION: There is occlusion of the left internal carotid artery extending to the left ICA terminus. No significant stenosis of the right intracranial internal carotid, bilateral anterior cerebral, or bilateral middle cerebral arteries. POSTERIOR CIRCULATION: No significant stenosis of the vertebral, basilar, or posterior cerebral arteries. Scattered foci of acute infarctions in the left frontal, parietal, temporal and occipital lobes and in the left basal ganglia/insula cortex. Associated local mass effect without midline shift. Occlusion of the left internal carotid artery starting from is origin, extending to the ICA terminus. This finding is acute and new since January 15, 2021. Remainder of the major arteries of the head and neck are patent without flow-limiting stenosis. The left MCA and left ROSEANN are likely supplied by patent anterior communicating artery.  Critical results were reported to Dr. Osmin Jackson at 3: 49 p.m. on January 26, 2021. Mra Head W Wo Contrast    Result Date: 1/26/2021  EXAMINATION: MRI OF THE BRAIN WITHOUT CONTRAST; MRA OF THE NECK WITH AND WITHOUT CONTRAST; MRA OF THE HEAD WITH AND WITHOUT CONTRAST 1/26/2021 2:19 pm: TECHNIQUE: Multiplanar multisequence MRI of the brain was performed without the administration of intravenous contrast.; Multiplanar multisequence MRA of the neck was performed with and without the administration of intravenous contrast. Stenosis of the internal carotid arteries measured using NASCET criteria. Maximum intensity projection images were reviewed.; Multiplanar multisequence MRA of the head was performed with and without the administration of intravenous contrast.  Maximum intensity projection images were reviewed. COMPARISON: None.  CTA neck January 15, 2020, CT scan earlier today HISTORY: ORDERING SYSTEM PROVIDED HISTORY: new onset aphasia s/p left paraopthalmic ICA aneurysm coiling TECHNOLOGIST PROVIDED HISTORY: new onset aphasia s/p left paraopthalmic ICA aneurysm coiling Is the patient pregnant?->No Reason for Exam: new onset aphasia s/p lt paraopthalmic ICA aneurysm coiling; ORDERING SYSTEM PROVIDED HISTORY: acute onset aphasia s/p treatment of left paraopthalmic ICA aneurysm treatment TECHNOLOGIST PROVIDED HISTORY: acute onset aphasia s/p treatment of left paraopthalmic ICA aneurysm treatment Is the patient pregnant?->No Reason for Exam: new onset aphasia s/p lt paraopthalmic ICA aneurysm coiling; ORDERING SYSTEM PROVIDED HISTORY: acute onset aphasia s/p left paraopthalmic aneurysm treatment TECHNOLOGIST PROVIDED HISTORY: acute onset aphasia s/p left paraopthalmic aneurysm treatment Is the patient pregnant?->No Reason for Exam: new onset aphasia s/p lt paraopthalmic ICA aneurysm coiling FINDINGS: MRI BRAIN: INTRACRANIAL STRUCTURES/VENTRICLES: There are scattered foci of acute infarctions in the left frontal, parietal, temporal and occipital lobes and in the left basal ganglia/insula cortex. There is associated local mass effect without midline shift. No evidence of an acute intracranial hemorrhage. No hydrocephalus. There is increased normal flow void in the left internal carotid artery, likely related to occlusion. MRA NECK: AORTIC ARCH/ARCH VESSELS: No dissection or arterial injury. No significant stenosis of the brachiocephalic or subclavian arteries. CAROTID ARTERIES: There is occlusion of the left internal carotid artery starting from is origin, extending to the ICA terminus. The right internal carotid artery and the right common carotid arteries are within normal limits without flow-limiting stenosis by NASCET criteria. VERTEBRAL ARTERIES: No dissection, arterial injury, or significant stenosis. MRA HEAD: ANTERIOR CIRCULATION: There is occlusion of the left internal carotid artery extending to the left ICA terminus. No significant stenosis of the right intracranial internal carotid, bilateral anterior cerebral, or bilateral middle cerebral arteries. POSTERIOR CIRCULATION: No significant stenosis of the vertebral, basilar, or posterior cerebral arteries. Scattered foci of acute infarctions in the left frontal, parietal, temporal and occipital lobes and in the left basal ganglia/insula cortex. Associated local mass effect without midline shift. Occlusion of the left internal carotid artery starting from is origin, extending to the ICA terminus. This finding is acute and new since January 15, 2021. Remainder of the major arteries of the head and neck are patent without flow-limiting stenosis. The left MCA and left ROSEANN are likely supplied by patent anterior communicating artery. Critical results were reported to Dr. Sarahi Bolanos at 3:49 p.m. on January 26, 2021.      Mri Limited Brain    Result Date: 1/26/2021  EXAMINATION: MRI OF THE BRAIN WITHOUT CONTRAST; MRA OF THE NECK WITH AND WITHOUT CONTRAST; MRA OF THE HEAD WITH AND WITHOUT CONTRAST 1/26/2021 2:19 pm: related to occlusion. MRA NECK: AORTIC ARCH/ARCH VESSELS: No dissection or arterial injury. No significant stenosis of the brachiocephalic or subclavian arteries. CAROTID ARTERIES: There is occlusion of the left internal carotid artery starting from is origin, extending to the ICA terminus. The right internal carotid artery and the right common carotid arteries are within normal limits without flow-limiting stenosis by NASCET criteria. VERTEBRAL ARTERIES: No dissection, arterial injury, or significant stenosis. MRA HEAD: ANTERIOR CIRCULATION: There is occlusion of the left internal carotid artery extending to the left ICA terminus. No significant stenosis of the right intracranial internal carotid, bilateral anterior cerebral, or bilateral middle cerebral arteries. POSTERIOR CIRCULATION: No significant stenosis of the vertebral, basilar, or posterior cerebral arteries. Scattered foci of acute infarctions in the left frontal, parietal, temporal and occipital lobes and in the left basal ganglia/insula cortex. Associated local mass effect without midline shift. Occlusion of the left internal carotid artery starting from is origin, extending to the ICA terminus. This finding is acute and new since January 15, 2021. Remainder of the major arteries of the head and neck are patent without flow-limiting stenosis. The left MCA and left ROSEANN are likely supplied by patent anterior communicating artery. Critical results were reported to Dr. Andre Vuong at 3:49 p.m. on January 26, 2021.          DISCHARGE INSTRUCTIONS     Discharge Medications:        Medication List      START taking these medications    amLODIPine 5 MG tablet  Commonly known as: NORVASC  Take 1 tablet by mouth daily for 21 doses  Start taking on: January 29, 2021     ticagrelor 90 MG Tabs tablet  Commonly known as: BRILINTA  Take 1 tablet by mouth 2 times daily for 21 days        CHANGE how you take these medications    aspirin 81 MG EC tablet  Take 1 tablet by mouth daily  Start taking on: January 29, 2021  What changed:   · medication strength  · how much to take        CONTINUE taking these medications    Emgality 120 MG/ML Soaj  Generic drug: Galcanezumab-gnlm  Inject 120 mg into the skin every 30 days     ibuprofen 800 MG tablet  Commonly known as: ADVIL;MOTRIN  Take 1 tablet by mouth every 6 hours as needed for Pain     norelgestromin-ethinyl estradiol 150-35 MCG/24HR  Commonly known as: ORTHO EVRA  Place 1 patch onto the skin once a week Place continuously, and after 3 months of continuous patches take a break for a period. Ubrogepant 100 MG Tabs  Take one at onset of migraine. May repeat in 2 hours. No more than 2 in 24 hours and 4 in 1 week        STOP taking these medications    clopidogrel 75 MG tablet  Commonly known as: PLAVIX           Where to Get Your Medications      These medications were sent to [address] [place] [id number]    Hours: 24-hours Phone: [phone]   · amLODIPine 5 MG tablet  · aspirin 81 MG EC tablet  · ticagrelor 90 MG Tabs tablet       Diet: DIET CARB CONTROL; Carb Control: 4 carb choices (60 gms)/meal diet as tolerated  Activity: Unrestricted with roller walker  Follow-up: Dr. Linda Osorio in 3 to 4 weeks, Dr. Court Livingston in 2 weeks, Dr. Brittany Harvey in 3 months, and Dr. Susu Hunter as soon as possible.   Time Spent for discharge: 30 minutes    Radha Mandujano DO  Neuro Critical Care  1/28/2021, 5:12 PM

## 2021-01-29 ENCOUNTER — TELEPHONE (OUTPATIENT)
Dept: FAMILY MEDICINE CLINIC | Age: 41
End: 2021-01-29

## 2021-01-29 LAB
EKG ATRIAL RATE: 70 BPM
EKG P AXIS: 47 DEGREES
EKG P-R INTERVAL: 122 MS
EKG Q-T INTERVAL: 406 MS
EKG QRS DURATION: 94 MS
EKG QTC CALCULATION (BAZETT): 438 MS
EKG R AXIS: 22 DEGREES
EKG T AXIS: 38 DEGREES
EKG VENTRICULAR RATE: 70 BPM

## 2021-01-29 NOTE — TELEPHONE ENCOUNTER
Patient's  states that the patient had 2 surgeries and needs a follow up as soon as possible with New Provider due to Dr. Nawaf Licona retiring. She can not wait until 2/22/2021.

## 2021-01-29 NOTE — CARE COORDINATION
.Stroke Follow Up Phone Call    Called phone number on record - No answer.      [x]Attempt #1 No answer  []Attempt #2 (final attempt)      Electronically signed by Lennie Finney RN on 1/29/21 at 10:29 AM EST

## 2021-01-29 NOTE — TELEPHONE ENCOUNTER
Patient needs sooner appt.    Moving to [facility] to [facility]  2/9/21

## 2021-01-30 NOTE — CARE COORDINATION
..Stroke Follow Up Phone Call    Called phone number on record - No answer.      []Attempt #1    [x]Attempt #2 (final attempt)      Electronically signed by Delbert Langley RN on 1/30/21 at 10:05 AM EST

## 2021-02-08 RX ORDER — AMLODIPINE BESYLATE 5 MG/1
5 TABLET ORAL DAILY
Qty: 21 TABLET | Refills: 0 | OUTPATIENT
Start: 2021-02-08 | End: 2021-03-01

## 2021-02-08 RX ORDER — TICAGRELOR 90 MG/1
TABLET ORAL
Qty: 42 TABLET | Refills: 0 | OUTPATIENT
Start: 2021-02-08

## 2021-02-08 RX ORDER — ASPIRIN 81 MG/1
TABLET, COATED ORAL
Qty: 21 TABLET | Refills: 0 | OUTPATIENT
Start: 2021-02-08

## 2021-02-09 ENCOUNTER — OFFICE VISIT (OUTPATIENT)
Dept: FAMILY MEDICINE CLINIC | Age: 41
End: 2021-02-09
Payer: COMMERCIAL

## 2021-02-09 VITALS
BODY MASS INDEX: 35.33 KG/M2 | HEIGHT: 63 IN | DIASTOLIC BLOOD PRESSURE: 80 MMHG | SYSTOLIC BLOOD PRESSURE: 112 MMHG | OXYGEN SATURATION: 100 % | WEIGHT: 199.4 LBS | HEART RATE: 79 BPM | TEMPERATURE: 97.2 F

## 2021-02-09 DIAGNOSIS — I63.232 CEREBRAL INFARCTION DUE TO OCCLUSION OF LEFT INTERNAL CAROTID ARTERY (HCC): ICD-10-CM

## 2021-02-09 DIAGNOSIS — Z09 HOSPITAL DISCHARGE FOLLOW-UP: ICD-10-CM

## 2021-02-09 DIAGNOSIS — I67.1 CEREBRAL ANEURYSM: Primary | ICD-10-CM

## 2021-02-09 DIAGNOSIS — R47.01 APHASIA: ICD-10-CM

## 2021-02-09 DIAGNOSIS — I10 ESSENTIAL HYPERTENSION: ICD-10-CM

## 2021-02-09 PROBLEM — I72.9 ANEURYSM (HCC): Status: RESOLVED | Noted: 2021-01-25 | Resolved: 2021-02-09

## 2021-02-09 PROCEDURE — 99495 TRANSJ CARE MGMT MOD F2F 14D: CPT | Performed by: NURSE PRACTITIONER

## 2021-02-09 PROCEDURE — 1111F DSCHRG MED/CURRENT MED MERGE: CPT | Performed by: NURSE PRACTITIONER

## 2021-02-09 RX ORDER — AMLODIPINE BESYLATE 5 MG/1
5 TABLET ORAL DAILY
Qty: 90 TABLET | Refills: 1 | Status: SHIPPED | OUTPATIENT
Start: 2021-02-09 | End: 2021-08-12

## 2021-02-09 ASSESSMENT — ENCOUNTER SYMPTOMS
EYES NEGATIVE: 1
RESPIRATORY NEGATIVE: 1
COUGH: 0
SHORTNESS OF BREATH: 0
DIARRHEA: 0
GASTROINTESTINAL NEGATIVE: 1
ANAL BLEEDING: 0
NAUSEA: 0
CHEST TIGHTNESS: 0
COLOR CHANGE: 0
VOMITING: 0
WHEEZING: 0
BLOOD IN STOOL: 0

## 2021-02-09 ASSESSMENT — PATIENT HEALTH QUESTIONNAIRE - PHQ9
SUM OF ALL RESPONSES TO PHQ QUESTIONS 1-9: 0
SUM OF ALL RESPONSES TO PHQ9 QUESTIONS 1 & 2: 0
1. LITTLE INTEREST OR PLEASURE IN DOING THINGS: 0
SUM OF ALL RESPONSES TO PHQ QUESTIONS 1-9: 0

## 2021-02-09 NOTE — PATIENT INSTRUCTIONS
[facility] at [address] [facility], [place], [address]  [phone]  Please call within 48 hours to schedule your appointment.

## 2021-02-09 NOTE — PROGRESS NOTES
ticagrelor (BRILINTA) 90 MG TABS tablet  Take 1 tablet by mouth 2 times daily for 21 days                   Medications marked \"taking\" at this time  Outpatient Medications Marked as Taking for the 2/9/21 encounter (Office Visit) with RUDOLPH Panda - CNP   Medication Sig Dispense Refill    amLODIPine (NORVASC) 5 MG tablet Take 1 tablet by mouth daily 90 tablet 1    aspirin 81 MG EC tablet Take 1 tablet by mouth daily 21 tablet 0    ticagrelor (BRILINTA) 90 MG TABS tablet Take 1 tablet by mouth 2 times daily for 21 days 42 tablet 0    Galcanezumab-gnlm (EMGALITY) 120 MG/ML SOAJ Inject 120 mg into the skin every 30 days 1 pen 5    norelgestromin-ethinyl estradiol (ORTHO EVRA) 150-35 MCG/24HR Place 1 patch onto the skin once a week Place continuously, and after 3 months of continuous patches take a break for a period. 4 patch 16        Medications patient taking as of now reconciled against medications ordered at time of hospital discharge: Yes    Chief Complaint   Patient presents with   [address] from Hospital       HPI    Inpatient course: Discharge summary reviewed- see chart.     Interval history/Current status:     Recently admitted to [address] [place] [address]. V's for elective aneurysm repair 1/25-1/28  Subsequent MCA stroke / MCA occlusion , MRI positive   C/o associated R sided motor weakness which is slowly improving  Also c/o verbal disturbance with some expressive aphasia   Upcoming appointment with therapy (PT / OT) outpatient Friday/Monday     Follows w/ Neurology Sandra Falcon - f/u 1 week     Neurosurgery - Dr. Beatriz Powers - f/u this Friday     Tolerating medications since discharge  Declines fever/chills, cough, dyspnea, chest pain, lower extremity edema, NVD, seizures or syncope   + motor weakness, decreased appetite, headaches, dizziness, and speech deficit     Review of Systems Constitutional: Positive for appetite change (dec.). Negative for chills, diaphoresis, fatigue, fever and unexpected weight change. HENT: Negative. Eyes: Negative. Respiratory: Negative. Negative for cough, chest tightness, shortness of breath and wheezing. Cardiovascular: Negative. Negative for chest pain, palpitations and leg swelling. Gastrointestinal: Negative. Negative for anal bleeding, blood in stool, diarrhea, nausea and vomiting. Endocrine: Negative. Genitourinary: Positive for menstrual problem. Negative for difficulty urinating, dysuria and hematuria. Musculoskeletal: Negative. Skin: Negative. Negative for color change, pallor, rash and wound. Neurological: Positive for dizziness (occ.), speech difficulty, weakness and headaches. Negative for seizures, syncope and facial asymmetry. Hematological: Negative. Psychiatric/Behavioral: Negative. Negative for sleep disturbance. Vitals:    02/09/21 1513   BP: 112/80   Pulse: 79   Temp: 97.2 °F (36.2 °C)   TempSrc: Temporal   SpO2: 100%   Weight: 199 lb 6.4 oz (90.4 kg)   Height: 5' 2.5\" (1.588 m)     Body mass index is 35.89 kg/m². Wt Readings from Last 3 Encounters:   02/09/21 199 lb 6.4 oz (90.4 kg)   01/25/21 190 lb (86.2 kg)   01/20/21 190 lb (86.2 kg)     BP Readings from Last 3 Encounters:   02/09/21 112/80   01/28/21 (!) 153/80   01/26/21 119/84       Physical Exam  Constitutional:       General: She is not in acute distress. Appearance: Normal appearance. She is well-developed. She is not ill-appearing, toxic-appearing or diaphoretic. HENT:      Head: Normocephalic and atraumatic. Right Ear: External ear normal.      Left Ear: External ear normal.      Nose: Nose normal.   Eyes:      General: No scleral icterus. Right eye: No discharge. Left eye: No discharge. Conjunctiva/sclera: Conjunctivae normal.      Pupils: Pupils are equal, round, and reactive to light.    Neck: Musculoskeletal: Normal range of motion and neck supple. Trachea: No tracheal deviation. Cardiovascular:      Rate and Rhythm: Normal rate and regular rhythm. Heart sounds: Normal heart sounds. No murmur. No friction rub. No gallop. Pulmonary:      Effort: Pulmonary effort is normal. No tachypnea, accessory muscle usage or respiratory distress. Breath sounds: Normal breath sounds. No stridor. No decreased breath sounds, wheezing, rhonchi or rales. Abdominal:      Palpations: Abdomen is soft. Musculoskeletal: Normal range of motion. General: No tenderness or deformity. Skin:     General: Skin is warm and dry. Coloration: Skin is not pale. Findings: No erythema or rash. Neurological:      Mental Status: She is alert and oriented to person, place, and time. GCS: GCS eye subscore is 4. GCS verbal subscore is 5. GCS motor subscore is 6. Cranial Nerves: Cranial nerves are intact. No cranial nerve deficit or facial asymmetry. Sensory: Sensation is intact. Motor: Weakness (R sided weakness 4/5 ) present. No tremor, atrophy or seizure activity. Coordination: Finger-Nose-Finger Test abnormal (slowed coordination R side ). Gait: Gait normal.      Comments: Train of thought normal  Slight delayed responses with answers    Psychiatric:         Attention and Perception: Attention and perception normal.         Mood and Affect: Mood and affect normal.         Speech: Speech is delayed. Behavior: Behavior normal. Behavior is cooperative. Thought Content: Thought content normal.         Cognition and Memory: Cognition and memory normal.         Judgment: Judgment normal.             Assessment/Plan:  1. Cerebral aneurysm    F/u with Neurology / Neurosurgery as scheduled     2.  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 Aphasia    - Ambulatory referral to Speech Therapy    4. Essential hypertension    - amLODIPine (NORVASC) 5 MG tablet; Take 1 tablet by mouth daily  Dispense: 90 tablet; Refill: 1    BP controlled, continue med  F/u 6 months or sooner PRN      5.  Hospital discharge follow-up    - [place] DISCHARGE MEDS RECONCILED W/ CURRENT OUTPATIENT MED LIST        Medical Decision Making: moderate complexity

## 2021-02-11 ENCOUNTER — TELEPHONE (OUTPATIENT)
Dept: OBGYN CLINIC | Age: 41
End: 2021-02-11

## 2021-02-11 NOTE — TELEPHONE ENCOUNTER
Pt called she is currently on the patch pt had a stroke last week and the Dr. Andrew Laurent her she needs to change from the patch she was wondering what her options are

## 2021-02-12 ENCOUNTER — TELEPHONE (OUTPATIENT)
Dept: FAMILY MEDICINE CLINIC | Age: 41
End: 2021-02-12

## 2021-02-12 ENCOUNTER — OFFICE VISIT (OUTPATIENT)
Dept: NEUROLOGY | Age: 41
End: 2021-02-12
Payer: COMMERCIAL

## 2021-02-12 VITALS
WEIGHT: 199 LBS | HEIGHT: 62 IN | DIASTOLIC BLOOD PRESSURE: 74 MMHG | SYSTOLIC BLOOD PRESSURE: 114 MMHG | HEART RATE: 73 BPM | BODY MASS INDEX: 36.62 KG/M2

## 2021-02-12 DIAGNOSIS — I10 ESSENTIAL HYPERTENSION: ICD-10-CM

## 2021-02-12 DIAGNOSIS — I67.1 ANEURYSM OF INTERNAL CAROTID ARTERY: Primary | ICD-10-CM

## 2021-02-12 PROCEDURE — 99215 OFFICE O/P EST HI 40 MIN: CPT | Performed by: STUDENT IN AN ORGANIZED HEALTH CARE EDUCATION/TRAINING PROGRAM

## 2021-02-12 NOTE — PROGRESS NOTES
Endovascular Neurosurgery follow-up clinic note. Pt Name: Lorraine Barnes  MRN: [id number]  YOB: 1980  Date of evaluation: 2/12/2021  Primary Care Physician: RUDOLPH Galicia CNP  Reason for evaluation: Left superior hypophyseal aneurysm treated with Surpass. SUBJECTIVE:   History of Chief Complaint:    Lorraine Barnes is a 38 y.o. female who is -American with past medical history including migraines, incidental L paraophthalmic ICA aneurysm. s/p aneurysm embo with surpass evolve FD device 1/25/2021. Post procedure patient had mild RUE weakness.     Subsequently on 1/26/2021 am patient had new speech issues and perseveration, confusion. MRI/A showed acute L MCA stroke and occlusion of the FD device. 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 S/p MT, angioplasty, stenting of the L ICA tici2c. cangrelor started with transition to brilinta. Pt has residual mixed aphasia and RUE weakness. Repeat CT head 1/26/2021 no bleed. She presented today for a follow-up. She is doing better. Her speech is better. She reported intermittent speech difficulty to express some words. Still has some left hand drop weakness/difficulty to control her fingers. No groin complaints. She is on dual antiplatelet therapy with aspirin and Brilinta. Allergies  is allergic to amoxicillin and penicillins. Medications  Prior to Admission medications    Medication Sig Start Date End Date Taking?  Authorizing Provider   amLODIPine (NORVASC) 5 MG tablet Take 1 tablet by mouth daily 2/9/21  Yes RUDOLPH Galicia CNP   aspirin 81 MG EC tablet Take 1 tablet by mouth daily 1/29/21  Yes Chloé Hendricks,    ticagrelor (BRILINTA) 90 MG TABS tablet Take 1 tablet by mouth 2 times daily for 21 days 1/28/21 2/18/21 Yes Chloé Hendricks, DO   [facility] 120 MG/ML SOAJ Inject 120 mg into the skin every 30 days 7/2/20  Yes RUDOLPH Ashby CNP   norelgestromin-ethinyl estradiol (ORTHO EVRA) 150-35 MCG/24HR Place 1 patch onto the skin once a week Place continuously, and after 3 months of continuous patches take a break for a period. 2/28/20  Yes Ivis Flower MD    Scheduled Meds:  Continuous Infusions:  PRN Meds:.  Past Medical History   has a past medical history of Carotid aneurysm, left ([address] CVA (cerebral vascular accident) ([address] and Migraines. Past Surgical History   has a past surgical history that includes Cardiac catheterization (01/25/2021); Cardiac catheterization; Cardiac catheterization (01/25/2021); and open thoracic aortic aneurysm repair. Social History   reports that she has never smoked. She has never used smokeless tobacco.   reports current alcohol use. reports previous drug use. Family History  family history includes Cancer in her father; Colon Cancer in her father; Diabetes in her father; Heart Disease in her brother, father, and mother; High Blood Pressure in her mother; Hypertension in her mother; Kidney Disease in her mother; Migraines in her mother. Review of Systems:  CONSTITUTIONAL:  negative for fevers, chills, fatigue and malaise    EYES:  negative for double vision, blurred vision and photophobia     HEENT:  negative for tinnitus, epistaxis and sore throat    RESPIRATORY:  negative for cough, shortness of breath, wheezing    CARDIOVASCULAR:  negative for chest pain, palpitations, syncope, edema    GASTROINTESTINAL:  negative for nausea, vomiting    GENITOURINARY:  negative for incontinence    MUSCULOSKELETAL:  negative for neck or back pain    NEUROLOGICAL:  Negative for weakness and tingling  negative for headaches and dizziness    PSYCHIATRIC:  negative for anxiety      Review of systems otherwise negative. OBJECTIVE:   Vitals: /74   Pulse 73   Ht 5' 2\" (1.575 m)   Wt 199 lb (90.3 kg)   BMI 36.40 kg/m²   General appearance: NAD. HEENT: Atraumatic. Abdomen: Soft nontender. Extremities: No lower limb edema noted. Neurologic: awake, following simple commands appropriately, able to name simple objects, intact comprehension, no dysarthria noted. CN: Has intact extraocular muscles movements, no facial droop noted, intact sensation on the face on trigeminal distribution bilaterally. MOTOR: Has good strength in both upper and lower extremities, moving both upper and lower extremities against gravity with no drift. SENSORY: Intact sensation to simple touch bilaterally in both upper and lower extremities. COORDINATION: Intact finger-nose test bilaterally with no dysmetria noted. NIH Stroke Scale Total:  1a.  Level of consciousness: 0  1b. Level of consciousness questions:  0   1c. Level of consciousness questions:  0   2. Best Gaze:  0   3. Visual:  0   4. Facial Palsy:  0   5a. Motor left arm:  0  5b. Motor right arm:  0  6a. Motor left le  6b. Motor right le  7. Limb Ataxia:  0 - absent  8. Sensory:  0 - normal; no sensory loss  9. Best Language:  0  10. Dysarthria:  0  11. Extinction and Inattention: 0     Total: 0    LABS:   No results for input(s): WBC, HGB, HCT, PLT, NA, K, CL, CO2, BUN, CREATININE, MG, PHOS, CALCIUM, PTT, INR, AST, ALT, BILITOT, BILIDIR, NITRU, COLORU, BACTERIA in the last 72 hours. Invalid input(s): PT, WBCU, RBCU, LEUKOCYTESUA  No results for input(s): ALKPHOS, ALT, AST, BILITOT, BILIDIR, LABALBU, AMYLASE, LIPASE in the last 72 hours. RADIOLOGY:   Images were personally reviewed including:  DSA: 2021  Impression:    --Left superior hypophyseal aneurysm projecting medially measuring approximately 5.15 x 4.2 x 4.5 mm with neck size at 2.78 mm. Proximal parent vessel diameter at 2.98 mm. --Successful endovascular treatment of the above-mentioned cerebral aneurysm with flow diversion utilizing a 3.25x15 mm Surpass Evolve device.  Asad score: class III.          DSA: 2021  Impression:   --Left cervical ICA pseudo occlusion due to left cavernous normotensive . Follow-up with Dr. Earle Kinney in April 2021 with MRA head with and without contrast.    Thank you for the interesting evaluation. Monica Salamanca MD  Pager [phone]  Stroke, [facility]  [address] [address] [place]  Electronically signed 2/12/2021 at 2:30 PM        I reviewed the [address] [place] [address] note and agree with the documented findings and plan of care. Any areas of disagreement are noted on the chart. I agree with the chief complaint, past medical history, past surgical history, allergies, medications, social and family history as documented unless otherwise noted below. I have personally seen and evaluated the patient and images. I find the patient's history and physical exam are consistent with the [address] [place] [address] documentation. I agree with the care provided, treatment rendered, disposition and follow-up plan. Late Entry Note for Date of Endovascular Neurosurgery/Stroke Service rendered on 2-.    56-year-old woman with surpass flow diverter embolization of a left paraophthalmic ICA aneurysm with occlusion of the device after deployment even on aspirin and clopidogrel. Had subsequent mechanical thrombectomy the next day and transition to aspirin and Brilinta. Good recovery from stroke with some occ expressive aphasia and fine motor right hand residual deficits nih 0. Up with Dr. Earle Kinney April 28, 2021 with MRA head with contrast prior to visit    52 minutes with greater than 50% of time spent face to face, counseling, coordinating care, examining patient, obtaining history, reviewing images, labs, and other notes personally, documenting clinical information and speaking with team.     Erica Miranda MD  Office [phone].  Cell [phone]  Stroke, [facility] [address] [facility] [address] [facility]

## 2021-02-12 NOTE — TELEPHONE ENCOUNTER
Chip Reina is calling to request a refill on the following medication(s):    Medication Request:  Requested Prescriptions     Pending Prescriptions Disp Refills    amLODIPine (NORVASC) 5 MG tablet 90 tablet 1     Sig: Take 1 tablet by mouth daily    ticagrelor (BRILINTA) 90 MG TABS tablet 42 tablet 0     Sig: Take 1 tablet by mouth 2 times daily for 21 days    aspirin 81 MG EC tablet 21 tablet 0     Sig: Take 1 tablet by mouth daily       Last Visit Date (If Applicable):  0/8/6344    Next Visit Date:    8/11/2021

## 2021-02-12 NOTE — TELEPHONE ENCOUNTER
Pt called and said that she just saw Dr Donald Hickey at [address] and he told her that he prefers for you to write rx's for asa,amlodipine, and Brilinta.   I will send rx encounter

## 2021-02-12 NOTE — TELEPHONE ENCOUNTER
She was started on these medications s/p neurosurgery in relation to a neurosurgical complication.  These medications need to be clarified and prescribed by her specialist. Please call her Neurologist.

## 2021-02-15 ENCOUNTER — OFFICE VISIT (OUTPATIENT)
Dept: PHYSICAL MEDICINE AND REHAB | Age: 41
End: 2021-02-15
Payer: COMMERCIAL

## 2021-02-15 VITALS
WEIGHT: 198.4 LBS | SYSTOLIC BLOOD PRESSURE: 121 MMHG | BODY MASS INDEX: 36.51 KG/M2 | HEIGHT: 62 IN | TEMPERATURE: 97.7 F | HEART RATE: 91 BPM | DIASTOLIC BLOOD PRESSURE: 87 MMHG

## 2021-02-15 DIAGNOSIS — I63.232 CEREBRAL INFARCTION DUE TO OCCLUSION OF LEFT INTERNAL CAROTID ARTERY (HCC): Primary | ICD-10-CM

## 2021-02-15 PROCEDURE — 99212 OFFICE O/P EST SF 10 MIN: CPT | Performed by: PHYSICAL MEDICINE & REHABILITATION

## 2021-02-15 NOTE — PROGRESS NOTES
[facility]  [facility] PHYSICAL MEDICINE AND REHABILITATION  [address] [address]  Dept: [phone]  Dept Fax: [phone]    Outpatient Followup Note    Mami Isabel, 38 y.o., female, presents for follow up c/o of Follow-up (f/u from rehab)  . HPI:     HPI  Patient being seen in follow up from inpatient consultation at [facility]. Jose for aneurysm coiling and L hemisphere ischemic CVA on 1/26/21. Her function improved during her hospitalization and she discharged directly home. She is performing outpatient therapy and has speech therapy ordered by her primary care provider but is waiting to hear from [facility]. Jose to schedule her appointment. Stroke    Affected Side: right dominant  Handedness: right handed  Therapy Status: Outpatient at [facility] for physical therapy and occupational therapy. She is performing home exercises at home as well to improve fine motor skills and coordination in affected dominant hand. Dysphagia: Absent   Aphasia: Broca's  Sleep:WNL  Daytime Focus/Attention:Impaired and not treated- slight issue - declines any need for medication currently  Bowel: Spontaneous  : Spontaneous  Mood: WNL  Support: children / family / friends to help, spouse, son and friends  Spasticity: Intermittent - R fingers. Dr. Yeyo Morris has ordered a splint for her R hand. Edema: None  DME: None  Shoulder Pain: None     Past Medical History:   Diagnosis Date    Carotid aneurysm, left ([address]     CVA (cerebral vascular accident) ([address]     Migraines       Past Surgical History:   Procedure Laterality Date    CARDIAC CATHETERIZATION  01/25/2021    IR ANGIOGRAM CAROTID CEREBRAL BILATERAL BRANDY SURPASS EVOLVE ANEURYSM EMBOLIZATION SYSTEM MRI CONDITIONAL 3T OK. SAFE IMMEDIATELY POST IMPLANT.     CARDIAC CATHETERIZATION      IR ANGIOGRAM CAROTID CEREBRAL BILATERAL    CARDIAC CATHETERIZATION  01/25/2021    IR ANGIOGRAM CAROTID CEREBRAL BILATERAL    OPEN REPAIR PERIPHERAL ANEURYSM      brain aneurysm repair     Family History   Problem Relation Age of Onset    Kidney Disease Mother     Heart Disease Mother     Hypertension Mother     Migraines Mother     High Blood Pressure Mother     Heart Disease Father     Colon Cancer Father     Diabetes Father     Cancer Father         prostate    Heart Disease Brother      Social History     Socioeconomic History    Marital status: Single     Spouse name: None    Number of children: None    Years of education: None    Highest education level: None   Occupational History    None   Social Needs    Financial resource strain: None    Food insecurity     Worry: None     Inability: None    Transportation needs     Medical: None     Non-medical: None   Tobacco Use    Smoking status: Never Smoker    Smokeless tobacco: Never Used   Substance and Sexual Activity    Alcohol use: Yes     Comment: 3 glasses weekly    Drug use: Not Currently    Sexual activity: Yes     Partners: Male   Lifestyle    Physical activity     Days per week: None     Minutes per session: None    Stress: None   Relationships    Social connections     Talks on phone: None     Gets together: None     Attends Latter-day service: None     Active member of club or organization: None     Attends meetings of clubs or organizations: None     Relationship status: None    Intimate partner violence     Fear of current or ex partner: None     Emotionally abused: None     Physically abused: None     Forced sexual activity: None   Other Topics Concern    None   Social History Narrative    None       Current Outpatient Medications   Medication Sig Dispense Refill    amLODIPine (NORVASC) 5 MG tablet Take 1 tablet by mouth daily 90 tablet 1    aspirin 81 MG EC tablet Take 1 tablet by mouth daily 21 tablet 0    ticagrelor (BRILINTA) 90 MG TABS tablet Take 1 tablet by mouth 2 times daily for 21 days 42 tablet 0    Galcanezumab-gnlm (EMGALITY) 120 MG/ML SOAJ Inject 120 mg into the skin every 30 days 1 pen 5    norelgestromin-ethinyl estradiol (ORTHO EVRA) 150-35 MCG/24HR Place 1 patch onto the skin once a week Place continuously, and after 3 months of continuous patches take a break for a period. 4 patch 16     No current facility-administered medications for this visit. Allergies   Allergen Reactions    Amoxicillin Swelling     lips    Penicillins Other (See Comments)     Pt. Unsure of reaction       Subjective:      Review of Systems  Constitutional: Negative for fever, chills and unexpected weight change. HENT: Negative for trouble swallowing. Respiratory: Negative for cough and shortness of breath. Cardiovascular: Negative for chest pain. Endocrine: Negative for polyuria. Genitourinary: Negative for dysuria, urgency, frequency, incontinence and difficulty urinating. Gastrointestinal: Negative for constipation or diarrhea. Musculoskeletal: Negative for arthralgias. Neurological: Negative for headaches, numbness, or tingling. Occasional dizziness if changes positions too quickly - adjusting how she transfers from lying to sitting to standing to accommodate  Psychiatric: Negative for depressed mood or anxiety. Objective:     Physical Exam  /87   Pulse 91   Temp 97.7 °F (36.5 °C) (Temporal)   Ht 5' 2\" (1.575 m)   Wt 198 lb 6.4 oz (90 kg)   BMI 36.29 kg/m²   Constitutional: She appears well-developed and well-nourished. In no distress. HEENT: NCAT, PERRL, EOMI. Mucous membranes pink and moist.  Pulmonary/Chest: Respirations WNL and unlabored. MSK: Functional ROM all extremities. Strength 5/5 key muscles LUE and BLEs. R finger extension and wrist extension 4/5. R shoulder and elbow muscles 5/5. Neurological: She is alert and oriented to person, place, and time. DTRs 2+ and symmetric. Gait WNL. CNs WNL. No spasticity in R finger flexors or R elbow flexors noted on exam today.    Skin: Skin is warm dry and intact with good turgor. Psychiatric: She has a normal mood and affect. Her behavior is normal. Thought content normal.   Nursing note and vitals reviewed. Diagnostic Studies:   CT OF THE HEAD WITHOUT CONTRAST  1/26/2021 11:28 pm       TECHNIQUE:   CT of the head was performed without the administration of intravenous   contrast. Dose modulation, iterative reconstruction, and/or weight based   adjustment of the mA/kV was utilized to reduce the radiation dose to as low   as reasonably achievable.       COMPARISON:   None.       HISTORY:   ORDERING SYSTEM PROVIDED HISTORY: post procedural   TECHNOLOGIST PROVIDED HISTORY:   post procedural       Is the patient pregnant?->No   Reason for Exam: Post procedural - Cardiac catheterization. Acuity: Acute   Type of Exam: Initial       FINDINGS:   BRAIN/VENTRICLES: There is no acute intracranial hemorrhage, mass effect or   midline shift.  No abnormal extra-axial fluid collection.  Corresponding to   the largest area of acute/subacute infarct noted on the limited brain MRI   performed 9 hours earlier, there is subtle hypodensity across the gray-white   junction in the posterior left temporal lobe.  The brain otherwise appears   normal at this time. Miah Gassaway is no evidence of hydrocephalus.       ORBITS: The visualized portion of the orbits demonstrate no acute abnormality.       SINUSES: The visualized paranasal sinuses and mastoid air cells demonstrate   no acute abnormality.       SOFT TISSUES/SKULL:  No acute abnormality of the visualized skull or soft   tissues.           Impression   Findings consistent with acute/subacute posterior left temporal infarct   laterally.       No intracranial hemorrhage identified. Assessment:       Diagnosis Orders   1. 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 Mild R Broca's aphasia. Has outpatient therapy ordered.  Confirmed that [facility] outpatient speech has active order and will contact her to schedule appointment. She can follow up with me as needed if her symptoms change or she has any further therapy or equipment needs. Asked her to monitor tone in her R hand and to return if spasticity becomes consistent or worsens. No orders of the defined types were placed in this encounter. No orders of the defined types were placed in this encounter. Return if symptoms worsen or fail to improve. Electronically signedby Maday Redd MD on 2/16/2021 at 9:52 AM.     Please note that this chartwas generated using voice recognition Dragon dictation software. Although everyeffort was made to ensure the accuracy of this automated transcription, some errorsin transcription may have occurred.

## 2021-02-15 NOTE — TELEPHONE ENCOUNTER
Pt's PCP;s office called states that they will not write for her [address] They would like you to continue to write for is since our office started her on it. Pt only has a few pills.

## 2021-02-15 NOTE — TELEPHONE ENCOUNTER
I spoke with a [address] [facility] at [address] [employer] office. She said she will send a message to him regarding the refill and they will contact the patient.   {atient has been advised of this

## 2021-02-17 ENCOUNTER — TELEPHONE (OUTPATIENT)
Dept: NEUROLOGY | Age: 41
End: 2021-02-17

## 2021-02-17 DIAGNOSIS — I67.1 ANEURYSM OF INTERNAL CAROTID ARTERY: Primary | ICD-10-CM

## 2021-02-17 NOTE — TELEPHONE ENCOUNTER
Yes we can continue to refill her aspirin and Brilinta. I prefer blood pressure medications (amlodipine) to be prescribed by the primary care physician so he/she can adjust the blood pressure medications accordingly for blood pressure goal normotensive  systolic.

## 2021-02-18 RX ORDER — ASPIRIN 81 MG/1
81 TABLET ORAL DAILY
Qty: 21 TABLET | Refills: 0 | OUTPATIENT
Start: 2021-02-18

## 2021-02-18 RX ORDER — ASPIRIN 81 MG/1
81 TABLET ORAL DAILY
Qty: 90 TABLET | Refills: 3 | Status: SHIPPED | OUTPATIENT
Start: 2021-02-18 | End: 2022-02-16

## 2021-02-18 RX ORDER — AMLODIPINE BESYLATE 5 MG/1
5 TABLET ORAL DAILY
Qty: 90 TABLET | Refills: 1 | OUTPATIENT
Start: 2021-02-18

## 2021-02-23 ENCOUNTER — HOSPITAL ENCOUNTER (OUTPATIENT)
Dept: SPEECH THERAPY | Age: 41
Setting detail: THERAPIES SERIES
Discharge: HOME | End: 2021-02-23
Payer: COMMERCIAL

## 2021-02-23 PROCEDURE — 92523 SPEECH SOUND LANG COMPREHEN: CPT

## 2021-02-23 NOTE — PROGRESS NOTES
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    Date of Eval: 2021   Evaluating Therapist: DONNA Ye    RECENT RESULTS  CT OF HEAD:  (  21  )    Impression   Findings consistent with acute/subacute posterior left temporal infarct   laterally.       No intracranial hemorrhage identified.             Primary Complaint: Juan A Wylie is a 38year old female who presented for outpatient speech therapy evaluation today. Pt with PMH of aneurysm repair last month. She reports word finding difficulties and has noticed she needs to write down more and more information so that she does not forget it. Pain:  Pain Assessment  Pain Assessment: 0-10  Pain Level: 0    Assessment: Pt presents with mild-moderate cognitive deficits at time of evaluation characterized by difficulty with immediate recall, delayed recall, and word generation tasks. No dysarthria or O/M deficits noted. Results & recommendations reviewed with pt who verbalized understanding. Pt reported her two priorities for ST include memory and word finding. ICD-10 Billing Codes: 90386 & 82898 - awaiting pt's insurance approval      Recommendations:  Requires SLP Intervention: Yes  Duration/Frequency of Treatment: 2x weekly for 60 minutes over 8 weeks  D/C Recommendations: No therapy recommended at discharge. Plan:   Goals:  Short-term Goals  Goal 1: Pt will recall 3-5 units with and without distractions with 90% accuracy.   Goal 2: Pt will recall paragraph and/or conversational information with and without distractions with 90% accuracy. Goal 3: Pt will utilize memory compensatory strategies to improve working and short-term memory. Goal 4: Pt will complete higher level word retrieval tasks with 90% accuracy. Goal 5: Pt will utilize word generation compensatory strategies to improve word finding in conversation. Patient/family involved in developing goals and treatment plan: yes    Subjective:  General  Chart Reviewed: Yes  Family / Caregiver Present: No  Social/Functional History  Lives With: Significant other; Son  Active : Yes  Occupation: (on temporary leave from [employer] employment)  Type of occupation: works at [address] [place] [address] Ave: Within Functional Limits  Hearing  Hearing: Within functional limits     Objective:     Oral/Motor  Oral Motor: Within functional limits    Auditory Comprehension  Comprehension: Within Functional Limits    Expression  Primary Mode of Expression: Verbal    Verbal Expression  Verbal Expression: Within functional limits    Motor Speech  Motor Speech: Within Functional Limits    Cognition:      Orientation  Overall Orientation Status: Within Normal Limits  Attention  Attention: Within Functional Limits  Memory  Memory: Exceptions to [facility]  Short-term Memory: Mild(delayed recall: 2/3; 2/3; 3/3; delayed recall of novel story: 4; delayed, cued recall: 7/7)  Working Memory: Moderate(forward digit span: 4; backward digit span: 3; immediate recall: 3/3; 3/5; 3/3; free recall of a novel story: 3; cued recall 5/7)  Problem Solving  Problem Solving: Within Functional Limits  Abstract Reasoning  Abstract Reasoning: Within Functional Limits  Safety/Judgement  Safety/Judgement: Within Functional Limits  Verbal Sequencing: WFL  Thought Organization: WFL  Word Generation: Mild (Orange Category Members: 7 in 30 seconds;  Abstract Category Members: 5 in 30 seconds; Starts with /s/: 7 in 30 seconds)    Prognosis:  Speech Therapy Prognosis  Prognosis: Good  Individuals consulted  Consulted and agree with results and recommendations: Patient    Education:  Patient Education: yes  Patient Education Response: Verbalizes understanding    Therapy Time:   Individual Concurrent Group Co-treatment   Time In 0900         Time Out 0952         Minutes [address] [place] [address], [place] [address] [facility]    2/23/2021 1:29 PM

## 2021-02-24 ENCOUNTER — OFFICE VISIT (OUTPATIENT)
Dept: NEUROLOGY | Age: 41
End: 2021-02-24
Payer: COMMERCIAL

## 2021-02-24 VITALS
WEIGHT: 200 LBS | HEART RATE: 99 BPM | HEIGHT: 62 IN | DIASTOLIC BLOOD PRESSURE: 80 MMHG | TEMPERATURE: 97.5 F | BODY MASS INDEX: 36.8 KG/M2 | SYSTOLIC BLOOD PRESSURE: 129 MMHG

## 2021-02-24 DIAGNOSIS — G43.019 INTRACTABLE MIGRAINE WITHOUT AURA AND WITHOUT STATUS MIGRAINOSUS: ICD-10-CM

## 2021-02-24 DIAGNOSIS — Z86.79 S/P CEREBRAL ANEURYSM REPAIR: Primary | ICD-10-CM

## 2021-02-24 DIAGNOSIS — Z98.890 S/P CEREBRAL ANEURYSM REPAIR: Primary | ICD-10-CM

## 2021-02-24 DIAGNOSIS — I63.232 CEREBRAL INFARCTION DUE TO OCCLUSION OF LEFT INTERNAL CAROTID ARTERY (HCC): ICD-10-CM

## 2021-02-24 PROCEDURE — 99214 OFFICE O/P EST MOD 30 MIN: CPT | Performed by: NURSE PRACTITIONER

## 2021-02-24 PROCEDURE — 1111F DSCHRG MED/CURRENT MED MERGE: CPT | Performed by: NURSE PRACTITIONER

## 2021-02-24 RX ORDER — AMITRIPTYLINE HYDROCHLORIDE 10 MG/1
10 TABLET, FILM COATED ORAL NIGHTLY
Qty: 30 TABLET | Refills: 5 | Status: SHIPPED | OUTPATIENT
Start: 2021-02-24 | End: 2021-07-07

## 2021-02-24 NOTE — PROGRESS NOTES
Intensity: 7-8/10  Headache chronicity: ~25 years  Headache frequency:2-3 headaches/week  Aggravating factors : bright lights, loud sounds, contraceptive patch change  Relieving factors: rest  Prophylactic medications: Emgality  Abortive medications: Fioricet  Previously used medications: Imitrex, Topamax, Toprol, Maxalt    Testing reviewed:    CTA Head Neck W Contrast 1/15/2021  Impression:  1. No acute intracranial abnormality. 2. Saccular 5 mm aneurysm arising in the region of the left paraophthalmic   ICA. 3. Otherwise, unremarkable CTA of the head and neck. These results were sent to the [facility] [address] ([facility]) on   1/15/2021 at 11:52 am to be communicated to the referring/covering health   care provider/office. -MRI brain 1/26/21     Impression   Scattered foci of acute infarctions in the left frontal, parietal, temporal   and occipital lobes and in the left basal ganglia/insula cortex.  Associated   local mass effect without midline shift.       Occlusion of the left internal carotid artery starting from is origin,   extending to the ICA terminus.  This finding is acute and new since January   15, 2021.       Remainder of the major arteries of the head and neck are patent without   flow-limiting stenosis.       The left MCA and left ROSEANN are likely supplied by patent anterior   communicating artery.       Critical results were reported to Dr. Gilberto Burch at 3:49 p.m. on January 26,                  PAST MEDICAL HISTORY:         Diagnosis Date    Carotid aneurysm, left ([address]     CVA (cerebral vascular accident) ([address]     Migraines         PAST SURGICAL HISTORY:         Procedure Laterality Date    CARDIAC CATHETERIZATION  01/25/2021    IR ANGIOGRAM CAROTID CEREBRAL BILATERAL BRANDY SURPASS EVOLVE ANEURYSM EMBOLIZATION SYSTEM MRI CONDITIONAL 3T OK. SAFE IMMEDIATELY POST IMPLANT.     CARDIAC CATHETERIZATION      IR ANGIOGRAM CAROTID CEREBRAL BILATERAL  CARDIAC CATHETERIZATION  01/25/2021    IR ANGIOGRAM CAROTID CEREBRAL BILATERAL    OPEN REPAIR PERIPHERAL ANEURYSM      brain aneurysm repair        SOCIAL HISTORY:     Social History     Socioeconomic History    Marital status: Single     Spouse name: Not on file    Number of children: Not on file    Years of education: Not on file    Highest education level: Not on file   Occupational History    Not on file   Social Needs    Financial resource strain: Not on file    Food insecurity     Worry: Not on file     Inability: Not on file    Transportation needs     Medical: Not on file     Non-medical: Not on file   Tobacco Use    Smoking status: Never Smoker    Smokeless tobacco: Never Used   Substance and Sexual Activity    Alcohol use: Yes     Comment: 3 glasses weekly    Drug use: Not Currently    Sexual activity: Yes     Partners: Male   Lifestyle    Physical activity     Days per week: Not on file     Minutes per session: Not on file    Stress: Not on file   Relationships    Social connections     Talks on phone: Not on file     Gets together: Not on file     Attends Zoroastrian service: Not on file     Active member of club or organization: Not on file     Attends meetings of clubs or organizations: Not on file     Relationship status: Not on file    Intimate partner violence     Fear of current or ex partner: Not on file     Emotionally abused: Not on file     Physically abused: Not on file     Forced sexual activity: Not on file   Other Topics Concern    Not on file   Social History Narrative    Not on file       CURRENT MEDICATIONS:     Current Outpatient Medications   Medication Sig Dispense Refill    amitriptyline (ELAVIL) 10 MG tablet Take 1 tablet by mouth nightly 30 tablet 5    ticagrelor (BRILINTA) 90 MG TABS tablet Take 1 tablet by mouth 2 times daily 180 tablet 1    aspirin EC 81 MG EC tablet Take 1 tablet by mouth daily 90 tablet 3 .                                                                                                    General Appearance:  Alert, cooperative, no signs of distress, appears stated age   Head:  Normocephalic, no signs of trauma   Eyes:  Conjunctiva/corneas clear;  eyelids intact   Ears:  Normal external ear and canals   Nose: Nares normal, mucosa normal, no drainage    Throat: Lips and tongue normal; teeth normal;  gums normal   Neck: Supple, intact flexion, extension and rotation;   trachea midline;  no adenopathy;   thyroid: not enlarged;   no carotid pulse abnormality   Back:   Symmetric, no curvature, ROM adequate   Lungs:   Respirations unlabored   Heart:  Regular rate and rhythm           Extremities: Extremities normal, no cyanosis, no edema   Pulses: Symmetric over head and neck   Skin: Skin color, texture normal, no rashes, no lesions                                     NEUROLOGIC EXAMINATION    Neurologic Exam  Mental status    Alert and oriented x 3; intact memory with no confusion, speech or language problems; no hallucinations or delusions  Fund of information appropriate for level of education    Cranial nerves    II - visual fields intact to confrontation bilaterally  III, IV, VI  extra-ocular muscles full: no pupillary defect; no MANASA, no nystagmus, no ptosis   V - normal facial sensation                                                               VII - normal facial symmetry                                                             VIII - intact hearing                                                                             IX, X - symmetrical palate                                                                  XI - symmetrical shoulder shrug                                                       XII - tongue midline without atrophy or fasciculation      Motor function  Normal muscle bulk and tone; strength 5/5 on all 4 extremities, no pronator drift Sensory function Intact to light touch, pinprick, vibration, proprioception on all 4 extremities      Cerebellar Intact fine motor movement. No involuntary movements or tremors. No ataxia or dysmetria on finger to nose or heel to shin testing      Reflex function DTR 2+ on bilateral UE and LE, symmetric. Negative Babinski      Gait                   normal base and arm swing                  Medical Decision Making: In summary, your patient, Holli Graham exhibits the following, with associated plan:    1. Migraine headaches without aura.  Currently getting 2-3 headaches per week with use of emgality  1. Start Ubrelvy 100 mg for abortive therapy, the patient was prescribed this at last visit however she did not begin taking yet due to recommendation of ED  2. Start amitriptyline 10 mg at bedtime daily  3. Continue Emgality 120 mg every 30 days  4. Continue ibuprofen as needed  1. Previous history of 5 mm aneurysm of left paraopthalmic ICA, underwent elective coiling on 1/25/21, subsequent left middle cerebral artery stroke secondary to occlusion of the left internal carotid artery status post mechanical thrombectomy  1. Continue brilinta 90 mg twice daily  2. Sinew aspirin 81 mg daily  3. Continue to follow with speech therapy and PT  4.  Return for follow up visit in 3 months               Signed: Rickey Pritchett CNP      *Please note that portions of this note were completed with a voice recognition program.  Although every effort was made to insure the accuracy of this automated transcription, some errors in transcription may have occurred, occasionally words and are mis-transcribed    Provider Attestation:

## 2021-03-02 ENCOUNTER — OFFICE VISIT (OUTPATIENT)
Dept: OBGYN CLINIC | Age: 41
End: 2021-03-02
Payer: COMMERCIAL

## 2021-03-02 VITALS
HEIGHT: 63 IN | DIASTOLIC BLOOD PRESSURE: 88 MMHG | HEART RATE: 87 BPM | WEIGHT: 200.25 LBS | SYSTOLIC BLOOD PRESSURE: 124 MMHG | BODY MASS INDEX: 35.48 KG/M2

## 2021-03-02 DIAGNOSIS — Z86.79 HISTORY OF CEREBRAL ANEURYSM: ICD-10-CM

## 2021-03-02 DIAGNOSIS — N92.0 MENORRHAGIA WITH REGULAR CYCLE: Primary | ICD-10-CM

## 2021-03-02 DIAGNOSIS — N94.6 DYSMENORRHEA: ICD-10-CM

## 2021-03-02 PROCEDURE — 99213 OFFICE O/P EST LOW 20 MIN: CPT | Performed by: OBSTETRICS & GYNECOLOGY

## 2021-03-02 ASSESSMENT — ENCOUNTER SYMPTOMS
SHORTNESS OF BREATH: 0
ABDOMINAL PAIN: 0
COUGH: 0
BACK PAIN: 0

## 2021-03-02 NOTE — PROGRESS NOTES
[address] [facility]  [facility] - [address] [address] [facility]  Dept: [phone]  Dept Fax: [phone]    21    Chief Complaint   Patient presents with    Discuss Medications     Discuss BC       Ren Harris 38 y.o. is here to discuss her birth control. She was on the orthoevra patch and then had a stroke recently and can no longer be on combination birth control. She still has some decreased strength in her right hand and some issues with short term memory. She wants to not have periods if possible because she has heavy painful periods. Review of Systems   Constitutional: Negative for chills and fever. HENT: Negative for congestion. Respiratory: Negative for cough and shortness of breath. Cardiovascular: Negative for chest pain and palpitations. Gastrointestinal: Negative for abdominal pain. Endocrine: Negative for cold intolerance and heat intolerance. Genitourinary: Negative for dyspareunia, pelvic pain and vaginal discharge. Musculoskeletal: Negative for back pain. Neurological: Negative for dizziness and light-headedness. Psychiatric/Behavioral: The patient is not nervous/anxious. Gynecologic History  No LMP recorded (lmp unknown). Contraception: OCP (estrogen/progesterone)  Last Pap: 3/30/18  Results: normal  Last Mammogram: n/a    Obstetric History  : 1  Para: 0  AB: 1    Past Medical History:   Diagnosis Date    Carotid aneurysm, left ([address]     CVA (cerebral vascular accident) ([address]     Migraines     Stroke [facility])      Past Surgical History:   Procedure Laterality Date    CARDIAC CATHETERIZATION  2021    IR ANGIOGRAM CAROTID CEREBRAL BILATERAL BRANDY SURPASS EVOLVE ANEURYSM EMBOLIZATION SYSTEM MRI CONDITIONAL 3T OK. SAFE IMMEDIATELY POST IMPLANT.    [address]      IR ANGIOGRAM CAROTID CEREBRAL BILATERAL    CARDIAC CATHETERIZATION  2021 IR ANGIOGRAM CAROTID CEREBRAL BILATERAL    OPEN REPAIR PERIPHERAL ANEURYSM      brain aneurysm repair     Allergies   Allergen Reactions    Amoxicillin Swelling     lips    Penicillins Other (See Comments)     Pt. Unsure of reaction     Current Outpatient Medications   Medication Sig Dispense Refill    amitriptyline (ELAVIL) 10 MG tablet Take 1 tablet by mouth nightly 30 tablet 5    ticagrelor (BRILINTA) 90 MG TABS tablet Take 1 tablet by mouth 2 times daily 180 tablet 1    aspirin EC 81 MG EC tablet Take 1 tablet by mouth daily 90 tablet 3    amLODIPine (NORVASC) 5 MG tablet Take 1 tablet by mouth daily 90 tablet 1    Galcanezumab-gnlm (EMGALITY) 120 MG/ML SOAJ Inject 120 mg into the skin every 30 days 1 pen 5    norelgestromin-ethinyl estradiol (ORTHO EVRA) 150-35 MCG/24HR Place 1 patch onto the skin once a week Place continuously, and after 3 months of continuous patches take a break for a period. 4 patch 16     No current facility-administered medications for this visit.       Social History     Socioeconomic History    Marital status: Single     Spouse name: Not on file    Number of children: Not on file    Years of education: Not on file    Highest education level: Not on file   Occupational History    Not on file   Social Needs    Financial resource strain: Not on file    Food insecurity     Worry: Not on file     Inability: Not on file    Transportation needs     Medical: Not on file     Non-medical: Not on file   Tobacco Use    Smoking status: Never Smoker    Smokeless tobacco: Never Used   Substance and Sexual Activity    Alcohol use: Yes     Comment: 3 glasses weekly    Drug use: Not Currently    Sexual activity: Yes     Partners: Male   Lifestyle    Physical activity     Days per week: Not on file     Minutes per session: Not on file    Stress: Not on file   Relationships    Social connections     Talks on phone: Not on file     Gets together: Not on file Attends Latter-day service: Not on file     Active member of club or organization: Not on file     Attends meetings of clubs or organizations: Not on file     Relationship status: Not on file    Intimate partner violence     Fear of current or ex partner: Not on file     Emotionally abused: Not on file     Physically abused: Not on file     Forced sexual activity: Not on file   Other Topics Concern    Not on file   Social History Narrative    Not on file     Family History   Problem Relation Age of Onset    Kidney Disease Mother     Heart Disease Mother     Hypertension Mother     Migraines Mother     High Blood Pressure Mother     Heart Disease Father     Colon Cancer Father     Diabetes Father     Cancer Father         prostate    Heart Disease Brother        Physical exam Physical Exam  Constitutional:       Appearance: Normal appearance. She is normal weight. HENT:      Head: Normocephalic. Eyes:      Extraocular Movements: Extraocular movements intact. Pulmonary:      Effort: Pulmonary effort is normal.   Neurological:      Mental Status: She is alert and oriented to person, place, and time. Psychiatric:         Mood and Affect: Mood normal.         Behavior: Behavior normal.         Thought Content: Thought content normal.         Judgment: Judgment normal.         Assessment/Plan  1. Menorrhagia with regular cycle  2. Dysmenorrhea  3. History of cerebral aneurysm  - Pt is wanting to do something for her heavy painful periods. - With her history of CVA she should avoid combination birth control and use progesterone only options. Discussed her risks of VTE with combination birth control. Discussed different options and risks/benefits of each. Pt would like to try the Mirena IUD. All questions answered. - Pt to take motrin 600 mg prior to procedure.       Pt to follow up with Mirena insertion  Rosetta Mendiola MD  [address]

## 2021-03-04 ENCOUNTER — HOSPITAL ENCOUNTER (OUTPATIENT)
Dept: SPEECH THERAPY | Age: 41
Setting detail: THERAPIES SERIES
Discharge: HOME | End: 2021-03-04
Payer: COMMERCIAL

## 2021-03-04 PROCEDURE — 97129 THER IVNTJ 1ST 15 MIN: CPT

## 2021-03-04 PROCEDURE — 97130 THER IVNTJ EA ADDL 15 MIN: CPT

## 2021-03-04 NOTE — PROGRESS NOTES
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 Delayed Picture Recall, 4 related units: Pt instructed to encode using associations strategy & provided verbal model. Immediate: 4/4 independently   10-Minute Delay: 4/4 independently   25-Minute Delay: 4/4 independently    Goal 2: Pt will recall paragraph and/or conversational information with and without distractions with 90% accuracy. Doctors Appointments: Pt presented appointment information & recalled information 7/7 accuracy when taking notes & 7/7 accuracy when not referring to notes    Goal 3: Pt will utilize memory compensatory strategies to improve working and short-term memory. Pt provided Memory Strategy Packet #1, the stages of memory. Pt completed packet with mod verbal cues. Pt provided copy for home use & instructed to review and complete test portion on own. Pt stated she uses a notebook to keep appointment information organized. Goal 4: Pt will complete higher level word retrieval tasks with 90% accuracy.   Naming by Letter: 9/12 independently, increased to 10/12 with max semantic and phonemic

## 2021-03-08 NOTE — PROGRESS NOTES
[facility]  [facility] Goldie Ibarra  [address]  [facility] [address]  Dept: [phone]    IUD Insertion Note        Ilana Saxena  3/9/2021  No LMP recorded (lmp unknown). HPI: The patient is requesting that a Mirena IUD be inserted for Heavy menstrual bleeding. She is known to have painful menses that interfere with her ADL's. She has tried NSAIDS in the past without success. She wishes to continue to utilize barrier contraception for family planning and STD precautions. The patient was counseled on the procedure. Risks, benefits and alternatives were reviewed. The side effect profile of the IUD was reviewed. A consent was reviewed and obtained. The patient was counseled on the need for string checks after her menses and coital activity. She is to notify the office if she can not locate the IUD string at anytime. She is aware that she will need a speculum exam and possibly an ultrasound to confirm the proper orientation of the IUD. She has no other chief complaint today. All other forms of family planning and options for treatment of her dysmennorhea were reviewed with the patient. She is not a smoker. The patient has had a blood clot in their leg, lung, brain or that any member of her family has had a sudden cardiac death under the age of 35 yo. Past Medical History:   Diagnosis Date    Carotid aneurysm, left ([address]     CVA (cerebral vascular accident) ([address]     Migraines     [facility])        Past Surgical History:   Procedure Laterality Date    CARDIAC CATHETERIZATION  01/25/2021    IR ANGIOGRAM CAROTID CEREBRAL BILATERAL BRANDY SURPASS EVOLVE ANEURYSM EMBOLIZATION SYSTEM MRI CONDITIONAL 3T OK. SAFE IMMEDIATELY POST IMPLANT.     CARDIAC CATHETERIZATION      IR ANGIOGRAM CAROTID CEREBRAL BILATERAL    CARDIAC CATHETERIZATION  01/25/2021    IR ANGIOGRAM CAROTID CEREBRAL BILATERAL    OPEN REPAIR PERIPHERAL ANEURYSM      brain aneurysm repair and without palpable masses. A sterile speculum was placed without incident. The site was then cleansed with betadine and the uterus was sounded to 7 cm. The Mirena IUD was opened and loaded into the delivery system. The wand was inserted to just past the internal portio and the button was retracted to the first line. The wand was held in place for 10 seconds and then the button was retracted to its final position while the IUD was moved to the fundus. The string was trimmed in standard fashion. Post procedure restrictions were reviewed and given to the patient. She was instructed to use barrier protection for sexually transmitted disease prevention as well as string checks/timing. The patient tolerated the procedure without difficulty. She was instructed to abstain for two weeks and use chauncey-pads for the first 8 weeks. She is to notify the office or go to the nearest Emergency Department if she experiences Abdominal Pain, Temperatures more than 101 F, Odiferous Vaginal Discharge, Dizziness or Shortness of breath. ASSESSMENT:  IUD Insertion   Diagnosis Orders   1. Menorrhagia with regular cycle  ND INSERT INTRAUTERINE DEVICE    levonorgestrel (MIRENA) IUD 52 mg 1 each    POCT urine pregnancy   2. Encounter for IUD insertion  ND INSERT INTRAUTERINE DEVICE    levonorgestrel (MIRENA) IUD 52 mg 1 each    POCT urine pregnancy     Patient Active Problem List    Diagnosis Date Noted    Cerebral aneurysm     Aneurysm of left internal carotid artery     S/P cerebral aneurysm repair     Cerebral infarction due to occlusion of left internal carotid artery (HCC)     [facility] ([facility] 01/25/2021    Intractable migraine without aura and without status migrainosus 01/08/2020    Dysmenorrhea 12/12/2018    Pelvic pain 12/12/2018     Family Planning    reports that she has never smoked.  She has never used smokeless tobacco.      PLAN:  Family Planning Counseling Completed  Barrier Recommendations Removal of the Mirena IUD will be in 6 years on 3/9/2027   Annual health follow-up  2/2022  Return to office in 4 wks for IUD check    Ysabel Hendricks MD

## 2021-03-09 ENCOUNTER — PROCEDURE VISIT (OUTPATIENT)
Dept: OBGYN CLINIC | Age: 41
End: 2021-03-09
Payer: COMMERCIAL

## 2021-03-09 VITALS
SYSTOLIC BLOOD PRESSURE: 124 MMHG | HEIGHT: 62 IN | HEART RATE: 88 BPM | WEIGHT: 200.25 LBS | DIASTOLIC BLOOD PRESSURE: 89 MMHG | BODY MASS INDEX: 36.85 KG/M2

## 2021-03-09 DIAGNOSIS — N92.0 MENORRHAGIA WITH REGULAR CYCLE: Primary | ICD-10-CM

## 2021-03-09 DIAGNOSIS — Z30.430 ENCOUNTER FOR IUD INSERTION: ICD-10-CM

## 2021-03-09 LAB
CONTROL: PRESENT
PREGNANCY TEST URINE, POC: NEGATIVE

## 2021-03-09 PROCEDURE — 58300 INSERT INTRAUTERINE DEVICE: CPT | Performed by: OBSTETRICS & GYNECOLOGY

## 2021-03-09 PROCEDURE — 81025 URINE PREGNANCY TEST: CPT | Performed by: OBSTETRICS & GYNECOLOGY

## 2021-03-11 ENCOUNTER — HOSPITAL ENCOUNTER (OUTPATIENT)
Dept: SPEECH THERAPY | Age: 41
Setting detail: THERAPIES SERIES
Discharge: HOME | End: 2021-03-11
Payer: COMMERCIAL

## 2021-03-11 PROCEDURE — 97130 THER IVNTJ EA ADDL 15 MIN: CPT

## 2021-03-11 PROCEDURE — 97129 THER IVNTJ 1ST 15 MIN: CPT

## 2021-03-11 NOTE — PROGRESS NOTES
Speech Language Pathology  Speech Language Pathology  [facility]    Cognitive Treatment Note    Date: 3/11/2021  Patients Name: Adeline Bullock  MRN: [id number]  Diagnosis:   Patient Active Problem List   Diagnosis Code    Dysmenorrhea N94.6    Pelvic pain R10.2    Intractable migraine without aura and without status migrainosus G43.019    Cerebral aneurysm rupture ([facility] I60.7    Aneurysm of left internal carotid artery I67.1    S/P cerebral aneurysm repair Z98.890, Z86.79    Cerebral infarction due to occlusion of left internal carotid artery ([facility] J22.658    Cerebral aneurysm I67.1       Pain: denies    Cognitive Treatment    Treatment time: 8715-4311    Session No.: 2    Subjective: [] Alert [] Cooperative     [] Confused     [] Agitated    [] Lethargic    Objective/Assessment:  Goal 1: Pt will recall 3-5 units with and without distractions with 90% accuracy. Memory & Mental Manipulation, 3 Words Reverse Order: 4/10 independently, increased to 10/10 with x1 repetition & mod-max verbal cues    Recall of a Picture Scene: Pt instructed to encode picture to be able to answer questions. Immediate: 10/10 independently   10-Minute Delay: 10/10 independently    Goal 2: Pt will recall paragraph and/or conversational information with and without distractions with 90% accuracy. Functional Memory Tasks, 3-4 Elements Inclusion: 9/10 independently, increased to 10/10 with min-mod verbal cue    Goal 3: Pt will utilize memory compensatory strategies to improve working and short-term memory. Pt provided Memory Strategy Packet #2, intro to memory aids. Pt completed packet with mod verbal cues. Pt provided copy for home use & instructed to review and complete test portion on own. Goal 4: Pt will complete higher level word retrieval tasks with 90% accuracy. Naming by Letter: 13/20 independently, increased to 17/20 with max semantic and phonemic cues.     [place] Category Members: Pt named 8 items in 5/5 presented categories independently    Naming Objects from Descriptions: 9/10 independently, increased to 10/10 with min-mod verbal cues     Naming Category Members: Pt named 8 items in 8/10 presented categories independently, increased to 10/10 with mod-max semantic cuers    Goal 5: Pt will utilize word generation compensatory strategies to improve word finding in conversation. Pt provided education re: circumlocution strategy & utilized in 5/5 opportunities with x1 verbal reminder    Other: Pt provided home activities. Pt reported motivation for tx & activities for home. Reported feelings of success following session this date. Plan:  [x] Continue ST services    [] Discharge from ST:      Discharge recommendations: No therapy recommended at discharge. Treatment completed by:  Stas Quinn M.S. [address] [facility]

## 2021-03-12 ENCOUNTER — TELEPHONE (OUTPATIENT)
Dept: NEUROLOGY | Age: 41
End: 2021-03-12

## 2021-03-12 NOTE — TELEPHONE ENCOUNTER
Allen Sifuentes Key: [id number] - PA Case ID: [id number] - Rx #: [id number] Need help?  Call us at [phone]   Status   Sent to [facility]      [employer]rug[employer] 120MG/ML syringes (migraine)   [employer] Electronic PA Form (NCPDP)

## 2021-03-16 ENCOUNTER — HOSPITAL ENCOUNTER (OUTPATIENT)
Dept: SPEECH THERAPY | Age: 41
Setting detail: THERAPIES SERIES
Discharge: HOME | End: 2021-03-16
Payer: COMMERCIAL

## 2021-03-16 PROCEDURE — 97130 THER IVNTJ EA ADDL 15 MIN: CPT

## 2021-03-16 PROCEDURE — 97129 THER IVNTJ 1ST 15 MIN: CPT

## 2021-03-16 NOTE — PROGRESS NOTES
Speech Language Pathology  Speech Language Pathology  [facility]    Cognitive Treatment Note    Date: 3/16/2021  Patients Name: Juan Grajeda  MRN: [id number]  Diagnosis:   Patient Active Problem List   Diagnosis Code    Dysmenorrhea N94.6    Pelvic pain R10.2    Intractable migraine without aura and without status migrainosus G43.019    Cerebral aneurysm rupture ([facility] I60.7    Aneurysm of left internal carotid artery I67.1    S/P cerebral aneurysm repair Z98.890, Z86.79    Cerebral infarction due to occlusion of left internal carotid artery ([facility] L30.363    Cerebral aneurysm I67.1       Pain: denies    Cognitive Treatment    Treatment time: 7430-1764    Session No.: 3    Subjective: [] Alert [] Cooperative     [] Confused     [] Agitated    [] Lethargic    Objective/Assessment:  Goal 1: Pt will recall 3-5 units with and without distractions with 90% accuracy. Word List Retention, Attribute Inclusion: 7/12 independently, increased to 12/12 with x1 verbal repetition     Delayed Recall, 4 Related Units: Pt verbally presented 4 related words & instructed to utilize strategy of choice for recall encoding. Pt verbalized understanding. Immediate: 4/4 independently   10-Minute Delay: 4/4 independently   20-Minute Delay: 4/4 independently    Goal 2: Pt will recall paragraph and/or conversational information with and without distractions with 90% accuracy. Functional Memory Task, Directions: 3/9 independently, increased to 7/9 with x1 verbal repetition, increased to 9/9 with x1 verbal repetition & mod verbal cues    Goal 3: Pt will utilize memory compensatory strategies to improve working and short-term memory. Reviewed visualization compensatory memory strategy & pt verbalized understanding. Reviewed with pt the stages of memory & pt verbalized understanding. Goal 4: Pt will complete higher level word retrieval tasks with 90% accuracy.   Naming by Letter: 20/20 independently    Naming [employer]: 17/18 independently, increased to 18/18 with min verbal cue    Naming Category Members: Pt named 8 items in 8/10 presented categories independently, increased to 10/10 with min-mod semantic cuers    Goal 5: Pt will utilize word generation compensatory strategies to improve word finding in conversation. NT    Other: Pt stated she had some other personal issues on her mind, which she felt made it hard to attend to session and stimuli. Plan:  [x] Continue ST services    [] Discharge from ST:      Discharge recommendations: No therapy recommended at discharge. Treatment completed by:  Gregory Eduardo M.S. [address] [facility]

## 2021-03-18 ENCOUNTER — HOSPITAL ENCOUNTER (OUTPATIENT)
Dept: SPEECH THERAPY | Age: 41
Setting detail: THERAPIES SERIES
Discharge: HOME | End: 2021-03-18
Payer: COMMERCIAL

## 2021-03-18 PROCEDURE — 97129 THER IVNTJ 1ST 15 MIN: CPT

## 2021-03-18 PROCEDURE — 97130 THER IVNTJ EA ADDL 15 MIN: CPT

## 2021-03-18 NOTE — PROGRESS NOTES
Speech Language Pathology  Speech Language Pathology  [facility]    Cognitive Treatment Note    Date: 3/18/2021  Patients Name: Delfino Choudhury  MRN: [id number]  Diagnosis:   Patient Active Problem List   Diagnosis Code    Dysmenorrhea N94.6    Pelvic pain R10.2    Intractable migraine without aura and without status migrainosus G43.019    Cerebral aneurysm rupture ([facility] I60.7    Aneurysm of left internal carotid artery I67.1    S/P cerebral aneurysm repair Z98.890, Z86.79    Cerebral infarction due to occlusion of left internal carotid artery ([facility] S23.418    Cerebral aneurysm I67.1       Pain: denies    Cognitive Treatment    Treatment time: 6370-2648    Session No.: 4    Subjective: [] Alert [] Cooperative     [] Confused     [] Agitated    [] Lethargic    Objective/Assessment:  Goal 1: Pt will recall 3-5 units with and without distractions with 90% accuracy. Delayed Picture Recall, 6 Unrelated Units: Pt verbally presented 6 unrelated pictures & instructed to utilize strategy of choice for recall encoding. Pt verbalized understanding. Immediate: 6/6 independently   10-Minute Delay: 6/6 independently    20-Minute Delay: 6/6 independently    Delayed Recall, 6 Unrelated Units: Pt verbally presented 6 unrelated words & instructed to utilize strategy of choice for recall encoding. Pt verbalized understanding. Immediate: 6/6 independently   10-Minute Delay: 6/6 independently   20-Minute Delay: 6/6 independently    Goal 2: Pt will recall paragraph and/or conversational information with and without distractions with 90% accuracy. Functional Memory Tasks, Exclusion: 4/11 independently, increased to 8/11 with x1 verbal repetition, increased to 11/11 with mod verbal cues & x1 verbal repetition    Goal 3: Pt will utilize memory compensatory strategies to improve working and short-term memory. Reviewed visualization compensatory memory strategy & pt verbalized understanding. Reviewed with pt the active observation strategy, & pt verbalized understanding. Provided written strategy lists for home reference. Goal 4: Pt will complete higher level word retrieval tasks with 90% accuracy. Naming by Letter: 12/20 independently, increased to 20/20 with mod verbal cues    [place] Category Members: Pt named 8 items in 6/9 presented categories independently, increased to 9/9 with min-mod semantic cuers    Goal 5: Pt will utilize word generation compensatory strategies to improve word finding in conversation. NT    Other: n/a    Plan:  [x] Continue ST services    [] Discharge from ST:      Discharge recommendations: No therapy recommended at discharge. Treatment completed by:  Russ Mcpherson M.S. [address] [facility]

## 2021-03-27 ENCOUNTER — HOSPITAL ENCOUNTER (EMERGENCY)
Age: 41
Discharge: HOME | End: 2021-03-27
Attending: EMERGENCY MEDICINE
Payer: COMMERCIAL

## 2021-03-27 VITALS
WEIGHT: 190 LBS | TEMPERATURE: 97.6 F | SYSTOLIC BLOOD PRESSURE: 149 MMHG | HEART RATE: 86 BPM | BODY MASS INDEX: 34.96 KG/M2 | OXYGEN SATURATION: 98 % | RESPIRATION RATE: 16 BRPM | HEIGHT: 62 IN | DIASTOLIC BLOOD PRESSURE: 59 MMHG

## 2021-03-27 DIAGNOSIS — M76.61 ACHILLES TENDINITIS OF RIGHT LOWER EXTREMITY: Primary | ICD-10-CM

## 2021-03-27 PROCEDURE — 99283 EMERGENCY DEPT VISIT LOW MDM: CPT

## 2021-03-27 ASSESSMENT — ENCOUNTER SYMPTOMS
SHORTNESS OF BREATH: 0
FACIAL SWELLING: 0
COLOR CHANGE: 0
COUGH: 0
EYE REDNESS: 0
DIARRHEA: 0
VOMITING: 0
EYE DISCHARGE: 0
ABDOMINAL PAIN: 0
CONSTIPATION: 0

## 2021-03-27 NOTE — ED TRIAGE NOTES
Pt c/o R ankle pain. Pt AOx4. States she was walking up stairs and felt a \"grab\" at her Achilles. Pt denies falling. +p/m/s.

## 2021-03-27 NOTE — ED PROVIDER NOTES
[facility] ED  EMERGENCY DEPARTMENT ENCOUNTER      Pt Name: Eliceo Ochoa  MRN: [id number]  [place] 1980  Date of evaluation: 3/27/2021  Provider: Dodie Doyle MD    CHIEF COMPLAINT       Chief Complaint   Patient presents with    Foot Pain         HISTORY OF PRESENT ILLNESS  (Location/Symptom, Timing/Onset, Context/Setting, Quality, Duration, Modifying Factors, Severity.)   Eliceo Ochoa is a 42 y.o. female who presents to the emergency department for pain in her right Achilles tendon area. She was walking on steps and she felt like somebody grabbed her there. It happened again and she has pain in that region. It hurts to bear weight on her leg. The foot and knee do not hurt at all. Nothing like this is ever happened to her before. She rated the pain as a 10. Nursing Notes were reviewed. ALLERGIES     Amoxicillin and Penicillins    CURRENT MEDICATIONS       Previous Medications    AMITRIPTYLINE (ELAVIL) 10 MG TABLET    Take 1 tablet by mouth nightly    AMLODIPINE (NORVASC) 5 MG TABLET    Take 1 tablet by mouth daily    ASPIRIN EC 81 MG EC TABLET    Take 1 tablet by mouth daily    GALCANEZUMAB-GNLM (EMGALITY) 120 MG/ML SOAJ    Inject 120 mg into the skin every 30 days    NORELGESTROMIN-ETHINYL ESTRADIOL (ORTHO EVRA) 150-35 MCG/24HR    Place 1 patch onto the skin once a week Place continuously, and after 3 months of continuous patches take a break for a period. TICAGRELOR (BRILINTA) 90 MG TABS TABLET    Take 1 tablet by mouth 2 times daily       PAST MEDICAL HISTORY         Diagnosis Date    Carotid aneurysm, left (HCC)     [facility]A [facility]cere[facility] accident) [facility] [address]     History of ischemic middle cerebral artery stroke     Migraines     Stroke [facility])        SURGICAL HISTORY           Procedure Laterality Date    CARDIAC CATHETERIZATION  01/25/2021    IR ANGIOGRAM CAROTID CEREBRAL BILATERAL BRANDY SURPASS EVOLVE ANEURYSM EMBOLIZATION SYSTEM MRI CONDITIONAL 3T OK.  SAFE IMMEDIATELY POST IMPLANT.  CARDIAC CATHETERIZATION      IR ANGIOGRAM CAROTID CEREBRAL BILATERAL    CARDIAC CATHETERIZATION  2021    IR ANGIOGRAM CAROTID CEREBRAL BILATERAL    INTRAUTERINE DEVICE INSERTION  2021    Mirena IUD [facility] 35811-217-85 Lot [id number] Exp May 2023    OPEN REPAIR PERIPHERAL ANEURYSM      brain aneurysm repair         FAMILY HISTORY           Problem Relation Age of Onset    Kidney Disease Mother     Heart Disease Mother     Hypertension Mother     Migraines Mother     High Blood Pressure Mother     Heart Disease Father     Colon Cancer Father     Diabetes Father     Cancer Father         prostate    Heart Disease Brother      Family Status   Relation Name Status    Mother      Father  Alive    Brother  (Not Specified)        SOCIAL HISTORY      reports that she has never smoked. She has never used smokeless tobacco. She reports current alcohol use. She reports previous drug use. REVIEW OF SYSTEMS    (2-9 systems for level 4, 10 or more for level 5)     Review of Systems   Constitutional: Negative for chills, fatigue and fever. HENT: Negative for congestion, ear discharge and facial swelling. Eyes: Negative for discharge and redness. Respiratory: Negative for cough and shortness of breath. Cardiovascular: Negative for chest pain. Gastrointestinal: Negative for abdominal pain, constipation, diarrhea and vomiting. Genitourinary: Negative for dysuria and hematuria. Musculoskeletal: Negative for arthralgias. Skin: Negative for color change and rash. Neurological: Negative for syncope, numbness and headaches. Hematological: Negative for adenopathy. Psychiatric/Behavioral: Negative for confusion. The patient is not nervous/anxious. Except as noted above the remainder of the review of systems was reviewed and negative.      PHYSICAL EXAM    (up to 7 for level 4, 8 or more for level 5)     Vitals:    21 0931   BP: (!) 149/59 Pulse: 86   Resp: 16   Temp: 97.6 °F (36.4 °C)   TempSrc: Oral   SpO2: 98%   Weight: 190 lb (86.2 kg)   Height: 5' 2\" (1.575 m)       Physical Exam  Vitals signs reviewed. Constitutional:       General: She is not in acute distress. Appearance: She is well-developed. She is not diaphoretic. HENT:      Head: Normocephalic and atraumatic. Eyes:      General: No scleral icterus. Right eye: No discharge. Left eye: No discharge. Neck:      Musculoskeletal: Neck supple. Cardiovascular:      Rate and Rhythm: Normal rate and regular rhythm. Pulmonary:      Effort: Pulmonary effort is normal. No respiratory distress. Breath sounds: Normal breath sounds. No stridor. No wheezing or rales. Abdominal:      General: There is no distension. Palpations: Abdomen is soft. Tenderness: There is no abdominal tenderness. Musculoskeletal: Normal range of motion. Comments: Achilles tendon is intact, Tavares test is normal.  She has tenderness along the Achilles tendon without bruise or swelling or deformity. The ankle and foot are nontender. Lymphadenopathy:      Cervical: No cervical adenopathy. Skin:     General: Skin is warm and dry. Findings: No erythema or rash. Neurological:      Mental Status: She is alert and oriented to person, place, and time.    Psychiatric:         Behavior: Behavior normal.             DIAGNOSTIC RESULTS     EKG: All EKG's are interpreted by the Emergency Department Physician who either signs or Co-signs this chart in the absence of a cardiologist.    Not indicated    RADIOLOGY:   Non-plain film images such as CT, Ultrasound and MRI are read by the radiologist. Plain radiographic images are visualized and preliminarily interpreted by the emergency physician with the below findings:    Not indicated    Interpretation per the Radiologist below, if available at the time of this note:        LABS:  Labs Reviewed - No data to display    All other labs were within normal range or not returned as of this dictation. EMERGENCY DEPARTMENT COURSE and DIFFERENTIAL DIAGNOSIS/MDM:   Vitals:    Vitals:    03/27/21 0931   BP: (!) 149/59   Pulse: 86   Resp: 16   Temp: 97.6 °F (36.4 °C)   TempSrc: Oral   SpO2: 98%   Weight: 190 lb (86.2 kg)   Height: 5' 2\" (1.575 m)       No orders of the defined types were placed in this encounter. Medical Decision Making: My clinical impression is that she has an Achilles tendon strain. I have no suspicion of bony injury and at this point I do not feel that an x-ray is indicated. Ace wrap applied and application checked by me and found to be appropriate. She is neurovascularly intact. She is placed on crutches and will follow up with podiatry. Treatment diagnosis and follow-up were discussed with the patient. CONSULTS:  None    PROCEDURES:  None    FINAL IMPRESSION      1.  Achilles tendinitis of right lower extremity          DISPOSITION/PLAN   DISPOSITION Decision To Discharge 03/27/2021 09:56:46 AM      PATIENT REFERRED TO:   RUDOLPH Montaño - [facility]  [address]  [address] [facility] [address] [phone]      As needed    [facility] ED  [address] [facility]  [phone]    If symptoms worsen    [address]  [phone]            DISCHARGE MEDICATIONS:     New Prescriptions    No medications on file         (Please note that portions of this note were completed with a voice recognition program.  Efforts were made to edit the dictations but occasionally words are mis-transcribed.)    Theodore Park MD  Attending Emergency Physician           Theodore Park MD  03/27/21 0242

## 2021-04-01 ENCOUNTER — HOSPITAL ENCOUNTER (OUTPATIENT)
Dept: SPEECH THERAPY | Age: 41
Setting detail: THERAPIES SERIES
Discharge: HOME | End: 2021-04-01
Payer: COMMERCIAL

## 2021-04-01 PROCEDURE — 97130 THER IVNTJ EA ADDL 15 MIN: CPT

## 2021-04-01 PROCEDURE — 97129 THER IVNTJ 1ST 15 MIN: CPT

## 2021-04-01 NOTE — PROGRESS NOTES
with 90% accuracy. Naming by Letter: 16/20 independently, increased to 20/20 with mod verbal cues    Naming Category Members: Pt named 8 items in 5/5 presented categories independently    Goal 5: Pt will utilize word generation compensatory strategies to improve word finding in conversation. Reviewed with patient circumlocution strategy, close synonym, or taking a break. Pt verbalized understanding. Other: Pt arrived today with foot boot and assistive walking device. Pt reported she was injured when climbing the stairs and will wear boot for 3-4 weeks. Reported that she has an appointment and will only come Thursday next week. Plan:  [x] Continue ST services    [] Discharge from ST:      Discharge recommendations: No therapy recommended at discharge. Treatment completed by:  Lalita Truong M.S. [address] [facility]

## 2021-04-05 ENCOUNTER — OFFICE VISIT (OUTPATIENT)
Dept: NEUROLOGY | Age: 41
End: 2021-04-05
Payer: COMMERCIAL

## 2021-04-05 VITALS
DIASTOLIC BLOOD PRESSURE: 84 MMHG | HEART RATE: 72 BPM | BODY MASS INDEX: 34.75 KG/M2 | TEMPERATURE: 97.3 F | SYSTOLIC BLOOD PRESSURE: 132 MMHG | HEIGHT: 62 IN

## 2021-04-05 DIAGNOSIS — Z86.79 S/P CEREBRAL ANEURYSM REPAIR: ICD-10-CM

## 2021-04-05 DIAGNOSIS — I67.1 ANEURYSM OF LEFT INTERNAL CAROTID ARTERY: ICD-10-CM

## 2021-04-05 DIAGNOSIS — Z98.890 S/P CEREBRAL ANEURYSM REPAIR: ICD-10-CM

## 2021-04-05 DIAGNOSIS — G43.019 INTRACTABLE MIGRAINE WITHOUT AURA AND WITHOUT STATUS MIGRAINOSUS: Primary | ICD-10-CM

## 2021-04-05 PROCEDURE — 99214 OFFICE O/P EST MOD 30 MIN: CPT | Performed by: NURSE PRACTITIONER

## 2021-04-05 NOTE — PROGRESS NOTES
[facility]            [address]          [place], [address] [facility]          Dept: [phone]          Dept Fax: [phone]        MD Neil Miller MD Ahmed B. Si Clinton, MD Acie Madrid, MD Miguel Comer, MD Harman Fu, CNP            4/5/2021      HISTORY OF PRESENT ILLNESS:       I had the pleasure of seeing Bassam Braun, who returns for continuing neurologic care. The patient was seen last on February 24, 2021 for treatment of migraines and a history of an aneurysm. Migraines: The patient suffers from migraine headaches without aura. At her last visit, she was getting 2-3 headaches per week with the use of Emglaity. She continues to take Emgality 120 mg monthly, and Amitriptyline 10 mg at bedtime. She also takes Ibuprofen as needed. Today, the patient reports that her headaches have been under control. She had an issue getting her Emgality because of her insurance and the pharmacy. She finally got it 3 days ago but she really has not needed it because of her use of Amitriptyline. She did not have an Emgality injection since February. She had to take Tylenol once. The patient never received the Darleene Glory that was prescribed at her last visit. 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  She underwent elective coiling on 1/25/2021 with subsequent left middle cerebral artery stroke secondary to occlusion of the left internal carotid artery status post mechanical thrombectomy. The patient is currently taking Brilinta 90 mg twice daily and Aspirin 81 mg daily. She is following with speech therapy and PT. Today, the patient reports that she is getting better. She still has some issues with her right hand. She notices that she has had issues with writing and other tasks. Testing reviewed:  CTA Head Neck W Contrast 1/15/2021  Impression:  1. No acute intracranial abnormality. 2. Saccular 5 mm aneurysm arising in the region of the left paraophthalmic   ICA. 3. Otherwise, unremarkable CTA of the head and neck.    These results were sent to the [facility] [employer] [address] ([address]) on   1/15/2021 at 11:52 am to be communicated to the referring/covering health   care provider/office.         -MRI brain 1/26/21      Impression   Scattered foci of acute infarctions in the left frontal, parietal, temporal   and occipital lobes and in the left basal ganglia/insula cortex.  Associated   local mass effect without midline shift.       Occlusion of the left internal carotid artery starting from is origin,   extending to the ICA terminus.  This finding is acute and new since January   15, 2021.       Remainder of the major arteries of the head and neck are patent without   flow-limiting stenosis.       The left MCA and left ROSEANN are likely supplied by patent anterior   communicating artery.       Critical results were reported to Dr. Shoaib Milner at 3:49 p.m. on January 26,           PAST MEDICAL HISTORY:         Diagnosis Date    [facility]ot[facility] left ([facility] [address]     CVA (cerebral vascular accident) ([facility] [address]     History of ischemic middle cerebral artery stroke     Migraines     Stroke [facility])         PAST SURGICAL HISTORY:         Procedure Laterality Date    CARDIAC CATHETERIZATION  01/25/2021    IR ANGIOGRAM CAROTID CEREBRAL BILATERAL BRANDY SURPASS MG tablet Take 1 tablet by mouth nightly 30 tablet 5    ticagrelor (BRILINTA) 90 MG TABS tablet Take 1 tablet by mouth 2 times daily 180 tablet 1    aspirin EC 81 MG EC tablet Take 1 tablet by mouth daily 90 tablet 3    amLODIPine (NORVASC) 5 MG tablet Take 1 tablet by mouth daily 90 tablet 1    Galcanezumab-gnlm (EMGALITY) 120 MG/ML SOAJ Inject 120 mg into the skin every 30 days 1 pen 5    norelgestromin-ethinyl estradiol (ORTHO EVRA) 150-35 MCG/24HR Place 1 patch onto the skin once a week Place continuously, and after 3 months of continuous patches take a break for a period. (Patient not taking: Reported on 4/5/2021) 4 patch 16     Current Facility-Administered Medications   Medication Dose Route Frequency Provider Last Rate Last Admin    levonorgestrel (MIRENA) IUD 52 mg 1 each  1 each Intrauterine Once Jorge Benítez MD   1 each at 03/09/21 0806        ALLERGIES:     Allergies   Allergen Reactions    Amoxicillin Swelling     lips    Penicillins Other (See Comments)     Pt. Unsure of reaction                                 REVIEW OF SYSTEMS        All items selected indicate a positive finding. Those items not selected are negative.   Constitutional [] Weight loss/gain   [] Fatigue  [] Fever/Chills   HEENT [] Hearing Loss  [] Visual Disturbance  [] Tinnitus  [] Eye pain   Respiratory [] Shortness of Breath  [] Cough  [] Snoring   Cardiovascular [] Chest Pain  [] Palpitations  [] Lightheaded   GI [] Constipation  [] Diarrhea  [] Swallowing change  [x] Nausea/vomiting    [] Urinary Frequency  [] Urinary Urgency   Musculoskeletal [] Neck pain  [] Back pain  [] Muscle pain  [] Restless legs   Dermatologic [] Skin changes   Neurologic [] Memory loss/confusion  [] Seizures  [] Trouble walking or imbalance  [] Dizziness  [] Sleep disturbance  [x] Weakness  [] Numbness  [] Tremors  [] Speech Difficulty  [x] Headaches  [x] Light Sensitivity  [x] Sound Sensitivity   Endocrinology []Excessive thirst []Excessive hunger   Psychiatric [] Anxiety/Depression  [] Hallucination   Allergy/immunology []Hives/environmental allergies   Hematologic/lymph [] Abnormal bleeding  [] Abnormal bruising         PHYSICAL EXAMINATION:       Vitals:    04/05/21 0857   BP: 132/84   Pulse: 72   Temp: 97.3 °F (36.3 °C)                                              .                                                                                                     General Appearance:  Alert, cooperative, no signs of distress, appears stated age   Head:  Normocephalic, no signs of trauma   Eyes:  Conjunctiva/corneas clear;  eyelids intact   Ears:  Normal external ear and canals   Nose: Nares normal, mucosa normal, no drainage    Throat: Lips and tongue normal; teeth normal;  gums normal   Neck: Supple, intact flexion, extension and rotation;   trachea midline;  no adenopathy;   thyroid: not enlarged;   no carotid pulse abnormality   Back:   Symmetric, no curvature, ROM adequate   Lungs:   Respirations unlabored   Heart:  Regular rate and rhythm           Extremities: Extremities normal, no cyanosis, no edema   Pulses: Symmetric over head and neck   Skin: Skin color, texture normal, no rashes, no lesions                                     NEUROLOGIC EXAMINATION    Neurologic Exam  Mental status    Alert and oriented x 3; intact memory with no confusion, speech or language problems; no hallucinations or delusions  Fund of information appropriate for level of education    Cranial nerves    II - visual fields intact to confrontation bilaterally  III, IV, VI  extra-ocular muscles full: no pupillary defect; no MANASA, no nystagmus, no ptosis   V - normal facial sensation                                                               VII - normal facial symmetry                                                             VIII - intact hearing                                                                             IX, X - symmetrical palate scribe accurately reflects the service I personally performed and the decisions made by myself. Portions of this note were transcribed by a scribe. I personally performed the history, physical exam, and medical decision-making and confirm the accuracy of the information in the transcribed note. Scribe Attestation:   By signing my name below, Willam Reese, attest that this documentation has been prepared under the direction and in the presence of Nataliya Hadley CNP.

## 2021-04-05 NOTE — PROGRESS NOTES
[facility] PHYSICIANS  [facility] - [address] [facility]  Dept: [phone]  4/6/21    Chief Complaint   Patient presents with   Patti fragoso        Lidia Vail is a 43 yo female who presents to the office for an IUD check. The Mirena IUD was placed on 3/9/21. She has any period since the IUD was placed. She has not cramping since it was placed. She is happy with the IUD. She has had intercourse since it wasplaced, and denies any complications. Review of Systems   Constitutional: Negative for chills and fever. HENT: Negative for congestion. Respiratory: Negative for cough and shortness of breath. Cardiovascular: Negative for chest pain and palpitations. Gastrointestinal: Negative for abdominal pain. Genitourinary: Negative for dyspareunia, pelvic pain and vaginal discharge. Musculoskeletal: Negative for back pain. Neurological: Negative for dizziness and light-headedness. Psychiatric/Behavioral: The patient is not nervous/anxious. Blood pressure 127/88, pulse 75, temperature 97 °F (36.1 °C), height 5' 2\" (1.575 m), weight 204 lb (92.5 kg), not currently breastfeeding. Gen:  Alert, no apparent distress  Pelvic:  Normal appearing vulva and vagina. Normal appearing cervix with IUD strings visible. Strings noted to be shorter and only 1 cm protruding from the os. A/P:   Diagnosis Orders   1. IUD check up     2.  IUD (intrauterine device) in place       Discussed continuing barrier protection    Pt to follow up for annual exam in 10 months  Rasheeda Bynum MD  [address]

## 2021-04-06 ENCOUNTER — PROCEDURE VISIT (OUTPATIENT)
Dept: OBGYN CLINIC | Age: 41
End: 2021-04-06
Payer: COMMERCIAL

## 2021-04-06 ENCOUNTER — HOSPITAL ENCOUNTER (OUTPATIENT)
Dept: MRI IMAGING | Age: 41
Discharge: HOME | End: 2021-04-08
Payer: COMMERCIAL

## 2021-04-06 VITALS
DIASTOLIC BLOOD PRESSURE: 88 MMHG | WEIGHT: 204 LBS | SYSTOLIC BLOOD PRESSURE: 127 MMHG | TEMPERATURE: 97 F | HEIGHT: 62 IN | BODY MASS INDEX: 37.54 KG/M2 | HEART RATE: 75 BPM

## 2021-04-06 DIAGNOSIS — I67.1 ANEURYSM OF INTERNAL CAROTID ARTERY: ICD-10-CM

## 2021-04-06 DIAGNOSIS — Z30.431 IUD CHECK UP: Primary | ICD-10-CM

## 2021-04-06 DIAGNOSIS — Z97.5 IUD (INTRAUTERINE DEVICE) IN PLACE: ICD-10-CM

## 2021-04-06 PROCEDURE — 99212 OFFICE O/P EST SF 10 MIN: CPT | Performed by: OBSTETRICS & GYNECOLOGY

## 2021-04-06 PROCEDURE — A9579 GAD-BASE MR CONTRAST NOS,1ML: HCPCS | Performed by: STUDENT IN AN ORGANIZED HEALTH CARE EDUCATION/TRAINING PROGRAM

## 2021-04-06 PROCEDURE — 70546 MR ANGIOGRAPH HEAD W/O&W/DYE: CPT

## 2021-04-06 PROCEDURE — 6360000004 HC RX CONTRAST MEDICATION: Performed by: STUDENT IN AN ORGANIZED HEALTH CARE EDUCATION/TRAINING PROGRAM

## 2021-04-06 RX ADMIN — GADOTERIDOL 17 ML: 279.3 INJECTION, SOLUTION INTRAVENOUS at 11:44

## 2021-04-06 ASSESSMENT — ENCOUNTER SYMPTOMS
BACK PAIN: 0
ABDOMINAL PAIN: 0
SHORTNESS OF BREATH: 0
COUGH: 0

## 2021-04-08 ENCOUNTER — HOSPITAL ENCOUNTER (OUTPATIENT)
Dept: SPEECH THERAPY | Age: 41
Setting detail: THERAPIES SERIES
Discharge: HOME | End: 2021-04-08
Payer: COMMERCIAL

## 2021-04-08 PROCEDURE — 97130 THER IVNTJ EA ADDL 15 MIN: CPT

## 2021-04-08 PROCEDURE — 97129 THER IVNTJ 1ST 15 MIN: CPT

## 2021-04-08 NOTE — PROGRESS NOTES
Speech Language Pathology  Speech Language Pathology  [address] [facility]    Cognitive Treatment Note    Date: 4/8/2021  Patients Name: Lorena Land  MRN: [id number]  Diagnosis:   Patient Active Problem List   Diagnosis Code    Dysmenorrhea N94.6    Pelvic pain R10.2    Intractable migraine without aura and without status migrainosus G43.019    Cerebral aneurysm rupture ([facility] [address] I60.7    Aneurysm of left internal carotid artery I67.1    S/P cerebral aneurysm repair Z98.890, Z86.79    Cerebral infarction due to occlusion of left internal carotid artery ([facility] [address] T47.093    Cerebral aneurysm I67.1    IUD (intrauterine device) in place Z97.5       Pain: denies    Cognitive Treatment  Treatment time: 5730-3136    Session No.: 6     Subjective: [x] Alert [x] Cooperative     [] Confused     [] Agitated    [] Lethargic    Objective/Assessment:  Goal 1: Pt will recall 3-5 units with and without distractions with 90% accuracy. Immediate Recall, 4 Related Words: 7/10 independently, increased to 10/10 with min-mod verbal cue    Immediate Recall, 4 Unrelated Words: 4/10 independently, increased to 9/10 with mod verbal cues, increased to 10/10 with mod verbal cues & x1 verbal repetition    Goal 2: Pt will recall paragraph and/or conversational information with and without distractions with 90% accuracy. Paragraph Information: Pt read paragraph aloud x1. Encouraged to listen & take notes. Then asked a series of questions re: information in paragraph. Pt did not consult notes during answering. Immediate: 11/12 independently, increased to 12/12 with mod verbal cue   15-Minute Delay: 12/12 independently    Goal 3: Pt will utilize memory compensatory strategies to improve working and short-term memory. Provided compensatory memory packet #13: Learning New Skills, SMASH Strategy & provided written copy for home use.  Pt completed packet with mod verbal cues and demonstrated understanding of strategy. Goal 4: Pt will complete higher level word retrieval tasks with 90% accuracy. Naming Abstract Category Members: Pt named 8 items in 10/10 presented categories independently    Goal 5: Pt will utilize word generation compensatory strategies to improve word finding in conversation. Reviewed with patient circumlocution strategy, close synonym, or taking a break. Pt verbalized understanding. Other: Pt arrived today with foot boot. Pt reported she was injured when climbing the stairs and will wear boot for another couple weeks but reports she is in less pain this week than last. Next session will take place 4-13, and re-evaluation will be performed to assess progress toward patient goals. Pt verbalized understanding & agreement with plan. Plan:  [x] Continue ST services    [] Discharge from ST:      Discharge recommendations: No therapy recommended at discharge. Treatment completed by:  Johana Frausto M.S. [address] [facility]

## 2021-04-13 ENCOUNTER — HOSPITAL ENCOUNTER (OUTPATIENT)
Dept: SPEECH THERAPY | Age: 41
Setting detail: THERAPIES SERIES
Discharge: HOME | End: 2021-04-13
Payer: COMMERCIAL

## 2021-04-13 PROCEDURE — 97129 THER IVNTJ 1ST 15 MIN: CPT

## 2021-04-13 PROCEDURE — 97130 THER IVNTJ EA ADDL 15 MIN: CPT

## 2021-04-13 NOTE — PROGRESS NOTES
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 ([facility] Z53.083    Cerebral aneurysm I67.1    IUD (intrauterine device) in place Z97.5       Pain: denies    Cognitive Treatment    Treatment time: 4481-9240  Session No.: 7      Subjective: [x] Alert [x] Cooperative     [] Confused     [] Agitated    [] Lethargic    Objective/Assessment:  Attention: maintained throughout, distractions minimized    Orientation: A & O x4 independently    Recall:    Forward Digit Span: 4, no change from initial evaluation on 2-23-21  Backward Digit Span: 7, improved from 3 in initial evaluation on 2-23-21  Immediate Recall: 3/3; 5/5; 2/3, increased to 3/3 with min verbal cue, improved from 3/3; 3/5; 3/3 on 2-23-21  Immediate Free Recall of a Novel Story: 9.5, improved from 3 on 2-23-21  Immediate Cued Recall of a Spoken Story: 7/7, improved from 5/7 on 2-23-21    Delayed Recall: 3/3; 3/3; 3/3 independently, improved from 2/3, 2/3, 3/3 on 2-23-21  Delayed Free Recall of a Novel Story: 7, improved from 4 on 2-23-21  Delayed Cued Recall of a Spoken Story: 7/7, no change from initial evaluation on 2-23-21    Organization:  [place] Category Member Generation: 11 in 30 seconds; improved from 7 on 2-23-21  Abstract Category Member Generation: 8 in 30 seconds, improved from 5 on 2-23-21  Naming by Letter: 8 in 30 seconds, improved from 7 on 2-23-21  Antonyms: 4/4 independently, no change from 2-23-21  Word Associations: 4/4 independently, no change from 21  Verbal Sequencin/4 independently, no change from 21    Problem Solving/Reasoning:  Similarties/Differences: 4/4 independently, no change from 21  Convergent Thinkin/2, no change from 21  Inferences: 5/5, no change from 21  Homographs: 4/4, improved from  on 21  Deductive Reasoning: 3/3 independently, no change from 21  Inductive Reasonin/4 independently, no change from 21  Problem Solvin/2, no change from 21  Task Insight: 3/3, no change from 21  Thought Flexibility: 6/6, no change from 21    Other:   Goal 1: Pt will recall 3-5 units with and without distractions with 90% accuracy. -GOAL MET    Goal 2: Pt will recall paragraph and/or conversational information with and without distractions with 90% accuracy. -GOAL MET    Goal 3: Pt will utilize memory compensatory strategies to improve working and short-term memory. -GOAL MET    Goal 4: Pt will complete higher level word retrieval tasks with 90% accuracy. -GOAL MET    Goal 5: Pt will utilize word generation compensatory strategies to improve word finding in conversation. -GOAL MET    Pt completed re-evaluation successfully today and has met or exceeded all treatment goals. ST to d/c patient at this time. Results & recommendations reviewed extensively with pt who verbalized agreement and understanding. Plan:  [] Continue ST services    [x] Discharge from [address] [place] [address]     Discharge recommendations: No therapy recommended at discharge. Treatment completed by:  Fide Velázquez M.S. [address] [facility]

## 2021-04-28 ENCOUNTER — OFFICE VISIT (OUTPATIENT)
Dept: NEUROLOGY | Age: 41
End: 2021-04-28
Payer: COMMERCIAL

## 2021-04-28 VITALS — SYSTOLIC BLOOD PRESSURE: 126 MMHG | DIASTOLIC BLOOD PRESSURE: 89 MMHG | WEIGHT: 204 LBS | BODY MASS INDEX: 37.31 KG/M2

## 2021-04-28 DIAGNOSIS — Z86.79 S/P CEREBRAL ANEURYSM REPAIR: Primary | ICD-10-CM

## 2021-04-28 DIAGNOSIS — Z98.890 S/P CEREBRAL ANEURYSM REPAIR: Primary | ICD-10-CM

## 2021-04-28 DIAGNOSIS — I63.232 CEREBRAL INFARCTION DUE TO OCCLUSION OF LEFT INTERNAL CAROTID ARTERY (HCC): ICD-10-CM

## 2021-04-28 DIAGNOSIS — I67.1 ANEURYSM OF LEFT INTERNAL CAROTID ARTERY: ICD-10-CM

## 2021-04-28 PROCEDURE — 99214 OFFICE O/P EST MOD 30 MIN: CPT | Performed by: PSYCHIATRY & NEUROLOGY

## 2021-04-28 RX ORDER — NORELGESTROMIN AND ETHINYL ESTRADIOL 150; 35 UG/D; UG/D
PATCH TRANSDERMAL
Qty: 9 PATCH | Refills: 7 | Status: SHIPPED | OUTPATIENT
Start: 2021-04-28 | End: 2021-05-18 | Stop reason: ALTCHOICE

## 2021-04-28 ASSESSMENT — ENCOUNTER SYMPTOMS
PHOTOPHOBIA: 0
SHORTNESS OF BREATH: 0
NAUSEA: 0
VOMITING: 0
COLOR CHANGE: 0
COUGH: 0
VOICE CHANGE: 0

## 2021-04-28 NOTE — PROGRESS NOTES
Endovascular Neurosurgery - [address], [address] [facility], [address] [place] [address]  P: [phone]  F: [phone]      Dear RUDOLPH Rick CNP    HPI:     GLADIS    Kelly Jones is a 42 y.o. 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 She has recovered well from her stent occlusion with thrombectomy and revascularization with angioplasty and atlas stent. She has done well on aspirin and brilinta, noted bruising though. She did sprain her right achilles end of march 2021 currently in boot. Otherwise no weakness. She has not yet returned to work and still has been working with therapy on language and on her Short term memory, multitasking. Occasional flushing of her faceend of day, mild transient sob. Might be brilinta which she hopefully be completed in next few months after angiogram      Allergies   Allergen Reactions    Amoxicillin Swelling     lips    Penicillins Other (See Comments)     Pt. Unsure of reaction     Current Outpatient Medications   Medication Sig Dispense Refill    amitriptyline (ELAVIL) 10 MG tablet Take 1 tablet by mouth nightly 30 tablet 5    ticagrelor (BRILINTA) 90 MG TABS tablet Take 1 tablet by mouth 2 times daily 180 tablet 1    aspirin EC 81 MG EC tablet Take 1 tablet by mouth daily 90 tablet 3    amLODIPine (NORVASC) 5 MG tablet Take 1 tablet by mouth daily 90 tablet 1    XULANE 150-35 MCG/24HR PLACE 1 PATCH ONTO THE SKIN ONCE A WEEK PLACE CONTINUOUSLY, AND AFTER 3 MONTHS OF CONTINUOUS PATCHES TAKE A BREAK FOR A PERIOD.  (Patient not taking: Reported on 4/28/2021) 9 patch 7    Galcanezumab-gnlm (EMGALITY) 120 MG/ML SOAJ Inject 120 mg into the skin every 30 days (Patient not taking: Reported on 4/28/2021) 1 pen 5     Current Facility-Administered Medications   Medication Dose Route Frequency Provider Last Rate Last Admin    levonorgestrel (MIRENA) IUD 52 mg 1 each  1 each Intrauterine Once Mohinder Lofotn MD   1 each at 03/09/21 0806     Past Medical History:   Diagnosis Date    Carotid aneurysm, left ([address]     [facility]A [facility]cerebral vascular accident) [facility]     History of ischemic middle cerebral artery stroke     Migraines     Stroke [facility])       Past Surgical History:   Procedure Laterality Date    CARDIAC CATHETERIZATION  01/25/2021    IR ANGIOGRAM CAROTID CEREBRAL BILATERAL BRANDY SURPASS EVOLVE ANEURYSM EMBOLIZATION SYSTEM MRI CONDITIONAL 3T OK. SAFE IMMEDIATELY POST IMPLANT.  CARDIAC CATHETERIZATION      IR ANGIOGRAM CAROTID CEREBRAL BILATERAL    CARDIAC CATHETERIZATION  01/25/2021    IR ANGIOGRAM CAROTID CEREBRAL BILATERAL    INTRAUTERINE DEVICE INSERTION  03/09/2021    Mirena IUD [address] [address]-312-14 Lot [id number] Exp May 2023    OPEN REPAIR PERIPHERAL ANEURYSM      brain aneurysm repair     Family History   Problem Relation Age of Onset    Kidney Disease Mother     Heart Disease Mother     Hypertension Mother    Sherrel Scott Migraines Mother     High Blood Pressure Mother     Heart Disease Father     Colon Cancer Father     Diabetes Father     Cancer Father         prostate    Heart Disease Brother      Social History     Tobacco Use    Smoking status: Never Smoker    Smokeless tobacco: Never Used   Substance Use Topics    Alcohol use: Yes     Comment: 3 glasses weekly        Subjective:     Review of Systems   Constitutional: Negative for fever and unexpected weight change. HENT: Negative for voice change. Eyes: Negative for photophobia and visual disturbance. Respiratory: Negative for cough and shortness of breath. Cardiovascular: Negative for chest pain and palpitations. Gastrointestinal: Negative for nausea and vomiting. Musculoskeletal: Negative for neck stiffness. Skin: Negative for color change and pallor. Neurological: Negative for weakness and headaches. Psychiatric/Behavioral: Negative for confusion and decreased concentration. right achilles tendon in boot      Objective:     /89 (Site: Right Upper Arm, Position: Sitting, Cuff Size: Medium Adult)   Wt 204 lb (92.5 kg) Comment: LKW  BMI 37.31 kg/m²   Physical Exam  GEN: Sitting in chair no apparent distress  CV: RRR  Neuro: Alert and oriented x3, intact language, attention, knowledge. Repeating without significant delay  CN: EOMI, PERRL, V1 to V3 intact, no facial asymmetry  Motor: 5/5 BUE/BLE  Sensory: Intact to light touch  Gait ambulating with right boot    NIH 1  Assessment:        Cathy Reyes is a 42 y.o. female who has successful recovery from her pipeline embolization of a left superior hypophyseal artery aneurysm with in-stent occlusion and thrombectomy and atlas stent  and 2021. Improving from aphasia, and short term memory   Diagnosis Orders   1. S/P cerebral aneurysm repair     2. Aneurysm of left internal carotid artery     3. 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  No orders of the defined types were placed in this encounter. No orders of the defined types were placed in this encounter. 1. Recommend followup imaging may 2021 since 4-5 months since pipeline embolization - diagnostic cerebral angiogram  2. No heavy lifting one week after procedure. Likely return to work no restrictions in 2021  3. Cont aspirin and brilinta. Consider stopping brilinta after angiogram if stents patent. 4. Cont therapy    Established Patient Visit Time: 32 minutes  Time Spent in Counselin minutes  Greater than 50% of the time in this visit was spent counseling and coordinating care of this patient. Patient given educational materials - see patient instructions. Discussed use, benefit, and side effects of prescribed medications. Personally reviewed imaging with patients and all questions answered. Pt voiced understanding. Patient agreed with treatment plan. Follow up as directed above. If you have any questions, please do not hesitate to call me.   I look forward to following Shaylee Hinojosa      Sincerely,        Kristi Ashley MD  Electronically signed by Kristi Ashley MD on 4/28/2021 at 11:05 AM

## 2021-05-18 ENCOUNTER — HOSPITAL ENCOUNTER (OUTPATIENT)
Dept: INTERVENTIONAL RADIOLOGY/VASCULAR | Age: 41
Discharge: HOME | End: 2021-05-20
Payer: COMMERCIAL

## 2021-05-18 VITALS
OXYGEN SATURATION: 98 % | BODY MASS INDEX: 37.91 KG/M2 | TEMPERATURE: 98.2 F | HEIGHT: 62 IN | HEART RATE: 66 BPM | SYSTOLIC BLOOD PRESSURE: 109 MMHG | WEIGHT: 206 LBS | DIASTOLIC BLOOD PRESSURE: 68 MMHG | RESPIRATION RATE: 16 BRPM

## 2021-05-18 DIAGNOSIS — I67.1 CEREBRAL ANEURYSM: ICD-10-CM

## 2021-05-18 LAB
GFR NON-AFRICAN AMERICAN: >60 ML/MIN
GFR SERPL CREATININE-BSD FRML MDRD: >60 ML/MIN
GFR SERPL CREATININE-BSD FRML MDRD: NORMAL ML/MIN/{1.73_M2}
GLUCOSE BLD-MCNC: 107 MG/DL (ref 74–100)
HCG, PREGNANCY URINE (POC): NEGATIVE
POC BUN: 9 MG/DL (ref 8–26)
POC CHLORIDE: 106 MMOL/L (ref 98–107)
POC CREATININE: 0.9 MG/DL (ref 0.51–1.19)
POC HEMATOCRIT: 40 % (ref 36–46)
POC HEMOGLOBIN: 13.7 G/DL (ref 12–16)
POC POTASSIUM: 3.9 MMOL/L (ref 3.5–4.5)
POC SODIUM: 141 MMOL/L (ref 138–146)

## 2021-05-18 PROCEDURE — 76937 US GUIDE VASCULAR ACCESS: CPT

## 2021-05-18 PROCEDURE — 2709999900 HC NON-CHARGEABLE SUPPLY

## 2021-05-18 PROCEDURE — C1894 INTRO/SHEATH, NON-LASER: HCPCS

## 2021-05-18 PROCEDURE — 7100000000 HC PACU RECOVERY - FIRST 15 MIN

## 2021-05-18 PROCEDURE — C1760 CLOSURE DEV, VASC: HCPCS

## 2021-05-18 PROCEDURE — 2500000003 HC RX 250 WO HCPCS: Performed by: STUDENT IN AN ORGANIZED HEALTH CARE EDUCATION/TRAINING PROGRAM

## 2021-05-18 PROCEDURE — C1769 GUIDE WIRE: HCPCS

## 2021-05-18 PROCEDURE — 99152 MOD SED SAME PHYS/QHP 5/>YRS: CPT | Performed by: PSYCHIATRY & NEUROLOGY

## 2021-05-18 PROCEDURE — 6360000004 HC RX CONTRAST MEDICATION: Performed by: PSYCHIATRY & NEUROLOGY

## 2021-05-18 PROCEDURE — 82435 ASSAY OF BLOOD CHLORIDE: CPT

## 2021-05-18 PROCEDURE — 82947 ASSAY GLUCOSE BLOOD QUANT: CPT

## 2021-05-18 PROCEDURE — 36224 PLACE CATH CAROTD ART: CPT | Performed by: PSYCHIATRY & NEUROLOGY

## 2021-05-18 PROCEDURE — 81025 URINE PREGNANCY TEST: CPT

## 2021-05-18 PROCEDURE — 99214 OFFICE O/P EST MOD 30 MIN: CPT | Performed by: PSYCHIATRY & NEUROLOGY

## 2021-05-18 PROCEDURE — 6360000002 HC RX W HCPCS: Performed by: STUDENT IN AN ORGANIZED HEALTH CARE EDUCATION/TRAINING PROGRAM

## 2021-05-18 PROCEDURE — 84520 ASSAY OF UREA NITROGEN: CPT

## 2021-05-18 PROCEDURE — C1887 CATHETER, GUIDING: HCPCS

## 2021-05-18 PROCEDURE — 2580000003 HC RX 258: Performed by: PSYCHIATRY & NEUROLOGY

## 2021-05-18 PROCEDURE — 84295 ASSAY OF SERUM SODIUM: CPT

## 2021-05-18 PROCEDURE — 82565 ASSAY OF CREATININE: CPT

## 2021-05-18 PROCEDURE — 84132 ASSAY OF SERUM POTASSIUM: CPT

## 2021-05-18 PROCEDURE — 85014 HEMATOCRIT: CPT

## 2021-05-18 PROCEDURE — 7100000001 HC PACU RECOVERY - ADDTL 15 MIN

## 2021-05-18 RX ORDER — HEPARIN SODIUM 5000 [USP'U]/ML
INJECTION, SOLUTION INTRAVENOUS; SUBCUTANEOUS
Status: COMPLETED | OUTPATIENT
Start: 2021-05-18 | End: 2021-05-18

## 2021-05-18 RX ORDER — SODIUM CHLORIDE 9 MG/ML
INJECTION, SOLUTION INTRAVENOUS CONTINUOUS
Status: DISCONTINUED | OUTPATIENT
Start: 2021-05-18 | End: 2021-05-21 | Stop reason: HOSPADM

## 2021-05-18 RX ORDER — MIDAZOLAM HYDROCHLORIDE 2 MG/2ML
INJECTION, SOLUTION INTRAMUSCULAR; INTRAVENOUS
Status: COMPLETED | OUTPATIENT
Start: 2021-05-18 | End: 2021-05-18

## 2021-05-18 RX ORDER — CLINDAMYCIN PHOSPHATE 600 MG/50ML
600 INJECTION INTRAVENOUS ONCE
Status: COMPLETED | OUTPATIENT
Start: 2021-05-18 | End: 2021-05-18

## 2021-05-18 RX ORDER — IODIXANOL 270 MG/ML
42 INJECTION, SOLUTION INTRAVASCULAR
Status: COMPLETED | OUTPATIENT
Start: 2021-05-18 | End: 2021-05-18

## 2021-05-18 RX ORDER — FENTANYL CITRATE 50 UG/ML
INJECTION, SOLUTION INTRAMUSCULAR; INTRAVENOUS
Status: COMPLETED | OUTPATIENT
Start: 2021-05-18 | End: 2021-05-18

## 2021-05-18 RX ADMIN — FENTANYL CITRATE 50 MCG: 50 INJECTION, SOLUTION INTRAMUSCULAR; INTRAVENOUS at 09:24

## 2021-05-18 RX ADMIN — HEPARIN SODIUM 2000 UNITS: 5000 INJECTION INTRAVENOUS; SUBCUTANEOUS at 09:37

## 2021-05-18 RX ADMIN — CLINDAMYCIN PHOSPHATE 600 MG: 600 INJECTION, SOLUTION INTRAVENOUS at 09:43

## 2021-05-18 RX ADMIN — IODIXANOL 42 ML: 270 INJECTION, SOLUTION INTRAVASCULAR at 10:33

## 2021-05-18 RX ADMIN — SODIUM CHLORIDE: 9 INJECTION, SOLUTION INTRAVENOUS at 08:09

## 2021-05-18 RX ADMIN — MIDAZOLAM HYDROCHLORIDE 1 MG: 1 INJECTION, SOLUTION INTRAMUSCULAR; INTRAVENOUS at 09:24

## 2021-05-18 ASSESSMENT — PULMONARY FUNCTION TESTS
PIF_VALUE: 0

## 2021-05-18 NOTE — H&P
Endovascular Neurosurgery H & P      Reason for evaluation: f/u L superior hypophyseal aneurysm s/p FD, MT and stent    SUBJECTIVE:   History of Chief Complaint:    36yo female with pmh of migraines, incidental L superior hypophyseal aneurysm s/p aneurysm embo with surpass evolve FD 1/25/2021. 1/26/2021 acute L MCA stroke and occlusion of the FD 2/2 plavix resistance. S/p MT, angioplasty, stenting of the L ICA tici2c. Pt's symptoms have since resolved. Pt presents for f/u dx angio to eval aneurysm and stent. Pt was last seen in clinic with dr. Tiffani Sna 4/28/2021. Pt has been well since this time, and denies any new neuro symptoms. Allergies  is allergic to amoxicillin and penicillins. Medications  Prior to Admission medications    Medication Sig Start Date End Date Taking? Authorizing Provider   Bujim Emms 150-35 MCG/24HR PLACE 1 PATCH ONTO THE SKIN ONCE A WEEK PLACE CONTINUOUSLY, AND AFTER 3 MONTHS OF CONTINUOUS PATCHES TAKE A BREAK FOR A PERIOD. Patient not taking: Reported on 4/28/2021 4/28/21   Silvia Burnett MD   amitriptyline (ELAVIL) 10 MG tablet Take 1 tablet by mouth nightly 2/24/21   RUDOLPH Heredia CNP   ticagrelor (BRILINTA) 90 MG TABS tablet Take 1 tablet by mouth 2 times daily 2/18/21 7/30/21  Rico Galvin MD   aspirin EC 81 MG EC tablet Take 1 tablet by mouth daily 2/18/21   Rico Galvin MD   amLODIPine (NORVASC) 5 MG tablet Take 1 tablet by mouth daily 2/9/21   RUDOLPH Montaño CNP   Galcanezumab-gnlm (EMGALITY) 120 MG/ML SOAJ Inject 120 mg into the skin every 30 days  Patient not taking: Reported on 4/28/2021 7/2/20   RUDOLPH Heredia CNP    Scheduled Meds:   levonorgestrel  1 each Intrauterine Once     Continuous Infusions:   sodium chloride       PRN Meds:.  Past Medical History   has a past medical history of Carotid aneurysm, left ([facility] CVA (cerebral vascular accident) ([facility] [address] [facility] History of ischemic middle cerebral artery stroke, Migraines, and Stroke ([facility] [address] [facility]   Past Surgical History   has a past surgical history that includes Cardiac catheterization (01/25/2021); Cardiac catheterization; Cardiac catheterization (01/25/2021); open thoracic aortic aneurysm repair; and intrauterine device insertion (03/09/2021). Social History   reports that she has never smoked. She has never used smokeless tobacco.   reports current alcohol use. reports previous drug use. Family History  family history includes Cancer in her father; Colon Cancer in her father; Diabetes in her father; Heart Disease in her brother, father, and mother; High Blood Pressure in her mother; Hypertension in her mother; Kidney Disease in her mother; Migraines in her mother. Review of Systems:  CONSTITUTIONAL:  negative for fevers, chills, fatigue and malaise    EYES:  negative for double vision, blurred vision and photophobia     HEENT:  negative for tinnitus, epistaxis and sore throat    RESPIRATORY:  negative for cough, shortness of breath, wheezing    CARDIOVASCULAR:  negative for chest pain, palpitations, syncope, edema    GASTROINTESTINAL:  negative for nausea, vomiting    GENITOURINARY:  negative for incontinence    MUSCULOSKELETAL:  negative for neck or back pain    NEUROLOGICAL:  Negative for weakness and tingling  negative for headaches and dizziness    PSYCHIATRIC:  negative for anxiety      Review of systems otherwise negative. OBJECTIVE:     Vitals:    05/18/21 0735   BP: 131/78   Pulse: 84   Resp: 20   Temp: 98.2 °F (36.8 °C)   SpO2: 100%        General:  Gen: obese habitus, NAD  HEENT: NCAT, mucosa moist  Cvs: RRR, S1 S2 normal  Resp: symmetric unlabored breathing  Abd: s/nd/nt  Ext: no edema  Skin: no lesions seen, warm and dry    Neuro:  Gen: awake and alert, oriented x3. Lang/speech: no aphasia or dysarthria. Follows commands.   CN: PERRL, EOMI, VFF, V1-3 intact, face symmetric, hearing intact, shoulder shrug symmetric, tongue midline  Motor: grossly 5/5 UE and LE b/l  Sense: LT intact in all 4 ext. Coord: FTN and HTS intact b/l  DTR: deferred  Gait: narrow base gait    NIH Stroke Scale:   1a  Level of consciousness: 0 - alert; keenly responsive   1b. LOC questions:  0 - answers both questions correctly   1c. LOC commands: 0 - performs both tasks correctly   2. Best Gaze: 0 - normal   3. Visual: 0 - no visual loss   4. Facial Palsy: 0 - normal symmetric movement   5a. Motor left arm: 0 - no drift, limb holds 90 (or 45) degrees for full 10 seconds   5b. Motor right arm: 0 - no drift, limb holds 90 (or 45) degrees for full 10 seconds   6a. Motor left le - no drift; leg holds 30 degree position for full 5 seconds   6b  Motor right le - no drift; leg holds 30 degree position for full 5 seconds   7. Limb Ataxia: 0 - absent   8. Sensory: 0 - normal; no sensory loss   9. Best Language:  0 - no aphasia, normal   10. Dysarthria: 0 - normal   11. Extinction and Inattention: 0 - no abnormality         Total:   0     MRS: 0      LABS:   Reviewed. RADIOLOGY:   Images were personally reviewed including:  MRA head w and wo con 2021  1. Endovascular stent in the cavernous left ICA extending to the supraclinoid   segment.  There is apparent moderate narrowing of the distal aspect of the   stent which could be artifactual.   2. Otherwise, unremarkable MRA of the head.  No recurrent aneurysm. DSA 2021  --Left cervical ICA pseudo occlusion due to left cavernous thrombosis in the region of the previously deployed Surpass flow diverter. --No evidence of Left superior hypophyseal artery aneurysm opacification. --Successful intra-arterial left cavernous ICA thrombectomy using contact aspiration?with ACE 68 and 3MAX (total 3 passes),?followed by balloon angioplasty using Quantum apex? 3?x 12?mm,? with subsequent left cavernous ICA stenting using Neuroform [place]    stent 4 x 24 mm.    --The above-mentioned treatments resulted in successful recanalization with TICI 2C with distal left posterior parietal artery M3/M4 occlusion noted. ? There is residual left cavernous ICA subocclusive thrombus noted. --Intra-arterial Cangrelor was given for left cavernous ICA flow diverter thrombosis? (using 2.5 mg per KG over 8 minutes as a bolus dose followed by maintenance dose). DSA 1/25/2021  --Left superior hypophyseal aneurysm projecting medially measuring approximately 5.15 x 4.2 x 4.5 mm with neck size at 2.78 mm. Proximal parent vessel diameter at 2.98 mm. --Successful endovascular treatment of the above-mentioned cerebral aneurysm with flow diversion utilizing a 3.25x15 mm [employer] Evolve device. Asad score: class III. ASSESSMENT:   34yo female with pmh of migraines, incidental L superior hypophyseal aneurysm s/p aneurysm embo with surpass evolve FD 1/25/2021. 1/26/2021 acute L MCA stroke and occlusion of the FD 2/2 plavix resistance. S/p MT, angioplasty, stenting of the L ICA tici2c. Pt's symptoms have since resolved. Pt presents for f/u dx angio to eval aneurysm and stent. Risks and benefits discussed, risks include but are not limited to bruising, stroke, subarachnoid hemorrhage, death, retroperitoneal hematoma, pseudoaneurysm, lower extremity/renal/peripheral vascular compromise, informed consent obtained from pt. PLAN:   --dx angio today  --continue asa 80  --plan to stop brilinta after dsa    Case discussed with Dr. Cade Terrazas attending.     Zhao Cabello MD, MD, PhD   Stroke, [facility]  [id number] [address]  Electronically signed 5/18/2021 at 7:23 AM

## 2021-05-18 NOTE — BRIEF OP NOTE
[facility]    NEUROENDOVASCULAR SERVICE: POST-OP NOTE: 5/18/2021    Pt Name: Jesse Severin  MRN: [id number]  [place]: 1980  Date of Procedure: 5/18/2021  Primary Care Physician: RUDOLPH Stacy - CNP      Pre-Procedural Diagnosis: s/p L superior hypophyseal aneurysm s/p prior FD, thrombectomy and stent  Post-Procedural Diagnosis: as above      Procedure Performed: cerebral angiogram    Surgeon:   Carissa Rowland MD    Fellow:  Lorrie Mcconnell MD, PhD     1st Assistant:  Brandon Duffy    PRE-PROCEDURAL EXAM:  Prestroke baseline mRS MODIFIED ERLIN SCORE: 0 - No symptoms at all. Neurological exam performed and unchanged from initial H&P or consult    Anesthesia: IV Moderate Sedation  Complications: none    Intra-Operative EXAM:  Transient L ulanar distribution numbness. EBL: < less than 50       Cc            Specimens: Were not Obtained  Contrast:     Visipaque 270 low osmolar 42 Cc             Fluoro: 10.5 min    Findings:  Please see dictated Radiology note for further details  --L superior hypophyseal aneurysm s/p prior FD, thrombectomy and stent, stent is widely patent with no residual in-stent thrombus. Aneurysm is completely obliterated, Asad score 1  --mild vasospasm in the distal cervical L ICA        Asad score: class I    POST-PROCEDURAL EXAM :   Stable neurological Exam  Neurological exam performed and unchanged from initial H&P or consult    Closure:  right [place] 5   F        POST-PROCEDURAL MONITORING : see orders  Disposition: Recovery room      Recommendations:  --Back to [facility]  --Do not bend right leg for 3 hours. --Groin checks per protocol. --Peripheral pulse checks per protocol. --SBP goal 100-140  --continue asa 81  --stop brilinta 90 bid  --Upon discharge follow up with Dr. Jessica Ibrahim in 2 weeks after discharge and Dr. Ezequiel Segal in 6 months after discharge.     Lorrie Mcconnell MD PhD fellow    Carissa Rowland MD   Pager [phone]  Stroke, Neurocritical Care & [facility]  [address]  Electronically signed 5/18/2021 at 10:43 AM

## 2021-06-21 ENCOUNTER — HOSPITAL ENCOUNTER (OUTPATIENT)
Dept: MRI IMAGING | Age: 41
Discharge: HOME | End: 2021-06-23
Payer: COMMERCIAL

## 2021-06-21 DIAGNOSIS — S86.011D STRAIN OF RIGHT ACHILLES TENDON, SUBSEQUENT ENCOUNTER: ICD-10-CM

## 2021-06-21 PROCEDURE — 73721 MRI JNT OF LWR EXTRE W/O DYE: CPT

## 2021-07-07 RX ORDER — AMITRIPTYLINE HYDROCHLORIDE 10 MG/1
TABLET, FILM COATED ORAL
Qty: 90 TABLET | Refills: 1 | Status: SHIPPED | OUTPATIENT
Start: 2021-07-07 | End: 2021-10-05 | Stop reason: SDUPTHER

## 2021-07-07 NOTE — TELEPHONE ENCOUNTER
Pharmacy requesting refill of amitriptyline.       Medication active on med list yes      Date of last fill: 2/24/21  with 5 refills verified on 7/7/21  verified by SALIMA DU      Date of last appointment 4/5/21    Next Visit Date:  10/5/2021

## 2021-08-12 DIAGNOSIS — I10 ESSENTIAL HYPERTENSION: ICD-10-CM

## 2021-08-12 RX ORDER — AMLODIPINE BESYLATE 5 MG/1
TABLET ORAL
Qty: 90 TABLET | Refills: 0 | Status: SHIPPED | OUTPATIENT
Start: 2021-08-12 | End: 2021-11-08

## 2021-08-12 NOTE — TELEPHONE ENCOUNTER
Letter sent to patient requesting appointment( she failed to keep her appt.  Yesterday)  Last Henley is calling to request a refill on the following medication(s):    Medication Request:  Requested Prescriptions     Pending Prescriptions Disp Refills    amLODIPine (NORVASC) 5 MG tablet [Pharmacy Med Name: AMLODIPINE BESYLATE 5 MG TAB] 90 tablet 1     Sig: TAKE 1 TABLET BY MOUTH EVERY DAY       Last Visit Date (If Applicable):  5/5/9961    Next Visit Date:    Visit date not found Mr. Champ Dutta was called at home on Wednesday, August 8, 2018 @ 1130 hours to discuss the results of his labs ( Chlamydia /GC / HIV / RPR) that were done on 07/27/18 & 08/06/18. Informed of the following: All test results were negative.      Electronically signed by:MARIA L ALATORRE  Aug  8 2018 11:47AM CST

## 2021-10-05 ENCOUNTER — OFFICE VISIT (OUTPATIENT)
Dept: NEUROLOGY | Age: 41
End: 2021-10-05
Payer: COMMERCIAL

## 2021-10-05 VITALS
DIASTOLIC BLOOD PRESSURE: 86 MMHG | HEART RATE: 75 BPM | SYSTOLIC BLOOD PRESSURE: 124 MMHG | BODY MASS INDEX: 36.44 KG/M2 | HEIGHT: 62 IN | WEIGHT: 198 LBS

## 2021-10-05 DIAGNOSIS — G43.011 INTRACTABLE MIGRAINE WITHOUT AURA AND WITH STATUS MIGRAINOSUS: Primary | ICD-10-CM

## 2021-10-05 PROCEDURE — 99214 OFFICE O/P EST MOD 30 MIN: CPT | Performed by: NURSE PRACTITIONER

## 2021-10-05 RX ORDER — PREDNISONE 20 MG/1
TABLET ORAL
Qty: 18 TABLET | Refills: 0 | Status: SHIPPED | OUTPATIENT
Start: 2021-10-05 | End: 2021-11-19 | Stop reason: ALTCHOICE

## 2021-10-05 RX ORDER — AMITRIPTYLINE HYDROCHLORIDE 10 MG/1
20 TABLET, FILM COATED ORAL NIGHTLY
Qty: 180 TABLET | Refills: 3 | Status: SHIPPED | OUTPATIENT
Start: 2021-10-05 | End: 2022-03-10 | Stop reason: SDUPTHER

## 2021-10-05 NOTE — PROGRESS NOTES
[facility]            [address]          [facility], [address] [facility]          Dept: [phone]          Dept Fax: [phone]        MD Terence Abreu MD Carmin Hoots, MD Chilton Fairly, ARI            10/5/2021      HISTORY OF PRESENT ILLNESS:       I had the pleasure of seeing Harvey Brunson, who returns for continuing neurologic care. 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 For prophylaxis of her migraine headaches she is prescribed amitriptyline 10 mg at bedtime daily. For abortive therapy she takes over the counter ibuprofen and tylenol. She is here today reporting a daily headache over the course of the past three weeks. The headaches are migrainous in nature and are associated with photophobia, phonophobia, nausea and vomiting. Before her current three week headache she was well controlled. She remained compliant to amitriptyline and denies any side effects. Headache location: bitemporal  Headache quality: pounding and throbbing  Associated factors:  nausea, vomiting, Photophobia, Phonophobia  Intensity: 7-8/10  Headache chronicity: ~25 years  Headache frequency: scattered events with Amitriptyline  Aggravating factors : bright lights, loud sounds, contraceptive patch change  Relieving factors: rest  Prophylactic medications: Amitriptyline  Abortive medications: Fioricet  Previously used medications: Imitrex, Topamax, Toprol, Maxalt, emgality    She also has ahistory of a 5 mm aneurysm of left paraopthalmic ICA and she underwent elective coiling in January 2021. 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  She is prescribed brilinta 90 mg twice daily and aspirin 81 mg daily for secondary stroke prevention. She saw Dr. Cassandra Mendiola in April 2021 who ordered an angiogram of her carotid arteries which showed that her previous aneurysm was completely obliterated, and the stent was widely patent with good wall apposition. Following the results of the angiogram, Simon Alvine was stopped and she remains on aspirin monotherapy. Testing reviewed:    IR Angiogram Carotid Cerebral Bilateral   Impression:    --The left superior hypophyseal aneurysm is completely obliterated, Asad score 1. There are previously placed flow diverter device and stent from the proximal left cavernous ICA to the paraophthalmic ICA. The stent widely patent with good wall    apposition. CTA Head Neck W Contrast 1/15/2021  Impression:  1. No acute intracranial abnormality. 2. Saccular 5 mm aneurysm arising in the region of the left paraophthalmic   ICA. 3. Otherwise, unremarkable CTA of the head and neck.    These results were sent to the [facility] [address] ([address] [facility]) on   1/15/2021 at 11:52 am to be communicated to the referring/covering health   care provider/office.         -MRI brain 1/26/21      Impression   Scattered foci of acute infarctions in the left frontal, parietal, temporal   and occipital lobes and in the left basal ganglia/insula cortex.  Associated   local mass effect without midline shift.       Occlusion of the left internal carotid artery starting from is origin,   extending to the ICA terminus.  This finding is acute and new since January   15, 2021.       Remainder of the major arteries of the head and neck are patent without   flow-limiting stenosis.       The left MCA and left ROSEANN are likely supplied by patent anterior   communicating artery.       Critical results were reported to Dr. José Miguel Viera at 3:49 p.m. on January 26,             PAST MEDICAL HISTORY:         Diagnosis Date    Carotid aneurysm, left ([address]     CVA (cerebral vascular accident) ([address]     History of ischemic middle cerebral artery stroke     Migraines     Stroke [facility])         PAST SURGICAL HISTORY:         Procedure Laterality Date    CARDIAC CATHETERIZATION  01/25/2021    IR ANGIOGRAM CAROTID CEREBRAL BILATERAL BRANDY SURPASS EVOLVE ANEURYSM EMBOLIZATION SYSTEM MRI CONDITIONAL 3T OK. SAFE IMMEDIATELY POST IMPLANT.  CARDIAC CATHETERIZATION      IR ANGIOGRAM CAROTID CEREBRAL BILATERAL    CARDIAC CATHETERIZATION  01/25/2021    IR ANGIOGRAM CAROTID CEREBRAL BILATERAL    INTRAUTERINE DEVICE INSERTION  03/09/2021    Mirena IUD [address] 27858-777-58 Lot [id number] Exp May 2023    OPEN REPAIR PERIPHERAL ANEURYSM      brain aneurysm repair    OTHER SURGICAL HISTORY  05/18/2021    cerebral angiogram        SOCIAL HISTORY:     Social History     Socioeconomic History    Marital status: Single     Spouse name: Not on file    Number of children: Not on file    Years of education: Not on file    Highest education level: Not on file   Occupational History    Not on file   Tobacco Use    Smoking status: Never Smoker    Smokeless tobacco: Never Used   Vaping Use    Vaping Use: Never used   Substance and Sexual Activity    Alcohol use: Yes     Comment: 3 glasses weekly    Drug use: Not Currently     Types: Marijuana    Sexual activity: Yes     Partners: Male   Other Topics Concern    Not on file   Social History Narrative    Not on file     Social Determinants of Health     Financial Resource Strain:     Difficulty of Paying Living Expenses:    Food Insecurity:     Worried About Running Out of Food in the Last Year:     Ran Out of Food in the Last Year:    Transportation Needs:     Lack of Transportation (Medical):      Lack of Transportation (Non-Medical):    Physical Activity:     Days of Exercise per Week:     Minutes of Exercise per Session:    Stress:     Feeling of Stress :    Social Connections:     Frequency of Communication with Friends and Family:     Frequency of Social Gatherings with Friends and Family:     Attends [employer] Services:     Active Member of Clubs or Organizations:     Attends Club or Organization Meetings:     Marital Status:    Intimate Partner Violence:     Fear of Current or Ex-Partner:     Emotionally Abused:     Physically Abused:     Sexually Abused:        CURRENT MEDICATIONS:     Current Outpatient Medications   Medication Sig Dispense Refill    predniSONE (DELTASONE) 20 MG tablet 3 tabs QD x 3 days then 2 tabs x 3 days then 1 tab QD x 3 days 18 tablet 0    Ubrogepant 100 MG TABS Take one at onset of migraine. May repeat in 2 hours. No more than 2 in 24 hours and 4 in 1 week 10 tablet 5    amitriptyline (ELAVIL) 10 MG tablet Take 2 tablets by mouth nightly 180 tablet 3    amLODIPine (NORVASC) 5 MG tablet TAKE 1 TABLET BY MOUTH EVERY DAY 90 tablet 0    aspirin EC 81 MG EC tablet Take 1 tablet by mouth daily 90 tablet 3     Current Facility-Administered Medications   Medication Dose Route Frequency Provider Last Rate Last Admin    levonorgestrel (MIRENA) IUD 52 mg 1 each  1 each IntraUTERine Once Muna Bar MD   1 each at 03/09/21 0806        ALLERGIES:     Allergies   Allergen Reactions    Amoxicillin Swelling     lips    Penicillins Other (See Comments)     Pt. Unsure of reaction                                 REVIEW OF SYSTEMS        All items selected indicate a positive finding. Those items not selected are negative.   Constitutional [] Weight loss/gain   [] Fatigue  [] Fever/Chills   HEENT [] Hearing Loss  [] Visual Disturbance  [] Tinnitus  [] Eye pain   Respiratory [] Shortness of Breath  [] Cough  [] Snoring   Cardiovascular [] Chest Pain  [] Palpitations  [] Lightheaded   GI [] Constipation  [] Diarrhea  [] Swallowing change  [x] Nausea/vomiting    [] Urinary Frequency  [] Urinary Urgency   Musculoskeletal [] Neck pain  [] Back pain  [] Muscle pain  [] Restless legs   Dermatologic [] Skin changes   Neurologic [] Memory loss/confusion  [] normal facial symmetry                                                             VIII - intact hearing                                                                             IX, X - symmetrical palate                                                                  XI - symmetrical shoulder shrug                                                       XII - tongue midline without atrophy or fasciculation      Motor function  Normal muscle bulk and tone; strength 5/5 on all 4 extremities, no pronator drift      Sensory function Intact to light touch, pinprick, vibration, proprioception on all 4 extremities      Cerebellar Intact fine motor movement. No involuntary movements or tremors. No ataxia or dysmetria on finger to nose or heel to shin testing      Reflex function DTR 2+ on bilateral UE and LE, symmetric. Down going toes bilaterally      Gait                   normal base and arm swing                  Medical Decision Making: In summary, your patient, Marylou Barnett exhibits the following, with associated plan:    1. Migraine headaches without aura.  Currently having a daily headache for the past three weeks. 1. Increase amitriptyline to 20 mg at bedtime daily  2. Initiate a prednisone taper with the following doses: 60 mg daily x 3 days, 40 mg daily x 3 days, 20 mg x 3 days  3. Continue Ibuprofen and Tylenol as needed  4. Start ubrelvy for abortive therapy, she has previously tried imitrex and maxalt which did not provide her with relief. Triptans are also contraindicated as she has a previous aneurysm and a previous stroke. 5. The patient should return for reevaluation in 6 months  2. 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  The patient reports today that she feels as if she may have another bleed. 1. Continue Aspirin 81 mg daily  2. STAT CT of the head WO contrast  3. Continue to follow with Dr. Yin Jackman  4. Return for follow up visit in 3 months               Signed: Rupert Ahumada CNP      *Please note that portions of this note were completed with a voice recognition program.  Although every effort was made to insure the accuracy of this automated transcription, some errors in transcription may have occurred, occasionally words and are mis-transcribed    Provider Attestation: The documentation recorded by the scribe accurately reflects the service I personally performed and the decisions made by myself. Portions of this note were transcribed by a scribe. I personally performed the history, physical exam, and medical decision-making and confirm the accuracy of the information in the transcribed note. Scribe Attestation:   By signing my name below, Sandy Hall, attest that this documentation has been prepared under the direction and in the presence of Rupert Ahumada CNP.

## 2021-10-06 ENCOUNTER — HOSPITAL ENCOUNTER (OUTPATIENT)
Dept: CT IMAGING | Age: 41
Discharge: HOME | End: 2021-10-08
Payer: COMMERCIAL

## 2021-10-06 ENCOUNTER — TELEPHONE (OUTPATIENT)
Dept: NEUROLOGY | Age: 41
End: 2021-10-06

## 2021-10-06 DIAGNOSIS — G43.011 INTRACTABLE MIGRAINE WITHOUT AURA AND WITH STATUS MIGRAINOSUS: ICD-10-CM

## 2021-10-06 PROCEDURE — 70450 CT HEAD/BRAIN W/O DYE: CPT

## 2021-10-07 ENCOUNTER — TELEPHONE (OUTPATIENT)
Dept: FAMILY MEDICINE CLINIC | Age: 41
End: 2021-10-07

## 2021-10-07 ENCOUNTER — OFFICE VISIT (OUTPATIENT)
Dept: FAMILY MEDICINE CLINIC | Age: 41
End: 2021-10-07
Payer: COMMERCIAL

## 2021-10-07 ENCOUNTER — HOSPITAL ENCOUNTER (OUTPATIENT)
Age: 41
Setting detail: SPECIMEN
Discharge: HOME | End: 2021-10-07
Payer: COMMERCIAL

## 2021-10-07 VITALS
HEART RATE: 75 BPM | DIASTOLIC BLOOD PRESSURE: 88 MMHG | OXYGEN SATURATION: 95 % | TEMPERATURE: 98 F | SYSTOLIC BLOOD PRESSURE: 130 MMHG

## 2021-10-07 DIAGNOSIS — Z20.822 SUSPECTED COVID-19 VIRUS INFECTION: ICD-10-CM

## 2021-10-07 DIAGNOSIS — J02.9 SORE THROAT: ICD-10-CM

## 2021-10-07 DIAGNOSIS — J02.9 SORE THROAT: Primary | ICD-10-CM

## 2021-10-07 DIAGNOSIS — R05.9 COUGH: ICD-10-CM

## 2021-10-07 LAB — S PYO AG THROAT QL: NORMAL

## 2021-10-07 PROCEDURE — 99213 OFFICE O/P EST LOW 20 MIN: CPT | Performed by: NURSE PRACTITIONER

## 2021-10-07 PROCEDURE — 87880 STREP A ASSAY W/OPTIC: CPT | Performed by: NURSE PRACTITIONER

## 2021-10-07 RX ORDER — FLUTICASONE PROPIONATE 50 MCG
2 SPRAY, SUSPENSION (ML) NASAL DAILY
Qty: 16 G | Refills: 0 | Status: SHIPPED | OUTPATIENT
Start: 2021-10-07 | End: 2021-11-19 | Stop reason: ALTCHOICE

## 2021-10-07 RX ORDER — AZITHROMYCIN 250 MG/1
250 TABLET, FILM COATED ORAL SEE ADMIN INSTRUCTIONS
Qty: 6 TABLET | Refills: 0 | Status: SHIPPED | OUTPATIENT
Start: 2021-10-07 | End: 2021-10-12

## 2021-10-07 ASSESSMENT — ENCOUNTER SYMPTOMS
SORE THROAT: 1
ABDOMINAL PAIN: 0
VISUAL CHANGE: 0
VOMITING: 0
SWOLLEN GLANDS: 1
CHANGE IN BOWEL HABIT: 0
NAUSEA: 0
COUGH: 1

## 2021-10-07 NOTE — PATIENT INSTRUCTIONS
Follow up with family doctor in 1 week as needed. Return immediately if worse, new symptoms develop, symptoms persist or have any questions or concerns. Push fluids, keep hydrated  Cool mist humidifier bedside  Continue all medications as prescribed  May alternate tylenol/motrin over the counter for pain or fever, take per package instructions. The COVID-19 test that was done today can take 1-6 days for results. Until then you should assume you have this disease and adhere to home isolation as described below. When we get the test results back, one of the following readings will be obtained. 1. A positive test means you have the virus. 2.  An inconclusive test means it wasn't sure if you have the virus or not. An inconclusive test result is treated as a positive result and recommendations  are the same as a positive test result. We may ask you to repeat this test in this circumstance. 3.  A negative test means you probably do not have the virus, but it is not conclusive. If your results of the COVID-19 test is POSITIVE- you must isolate yourself from others. You can be around others after:    10 days since symptoms first appeared (immunocompromised will quarantine for 20 days) and  24 hours with no fever without the use of fever-reducing medications and  Other symptoms of COVID-19 are improving*  *Loss of taste and smell may persist for weeks or months after recovery and need not delay the end of isolation  For people who are immunocopromised, quarantine may last for 20 days. Most people do not require testing to decide when they can be around others; however, if your healthcare provider recommends testing, they will let you know when you can resume being around others based on your test results. Note that these recommendations do not apply to persons with severe COVID-19 or with severely weakened immune systems (immunocompromised).  These persons should follow the guidance below for I was severely ill with COVID-19 or have a severely weakened immune system (immunocompromised) due to a health condition or medication. When can I be around others?     KittenExchange.[email or web address]. html    Prevention steps for People with positive or inconclusive test results or suspected  COVID-19 (including persons under investigation) who do not need to be hospitalized  and   People with confirmed COVID-19 who were hospitalized and determined to be medically stable to go home    Contacts who are NOT healthcare providers or first responders and are asymptomatic (no fever,  cough, shortness of breath, or difficulty breathing) should self-quarantine for 14 days from the last  date of exposure to confirmed COVID-19. Your healthcare provider and public health staff will evaluate whether you can be cared for at home. If it is determined that you do not need to be hospitalized and can be isolated at home, you will be monitored by staff from your health department. You should follow the prevention steps below until a healthcare provider or local or state health department says you can return to your normal activities. Stay home except to get medical care  People who are mildly ill with COVID-19 are able to isolate at home during their illness. You should restrict activities outside your home, except for getting medical care. Do not go to work, school, or public areas. Avoid using public transportation, ride-sharing, or taxis. Separate yourself from other people and animals in your home  People: As much as possible, you should stay in a specific room and away from other people in your home. Also, you should use a separate bathroom, if available. Animals: You should restrict contact with pets and other animals while you are sick with COVID-19, just like you would around other people.  When possible, have another member of your household care for your animals while you are sick. If you are sick with COVID-19, avoid contact with your pet, including petting, snuggling, being kissed or licked, and sharing food. If you must care for your pet or be around animals while you are sick, wash your hands before and after you interact with pets and wear a facemask. Call ahead before visiting your doctor  If you have a medical appointment, call the healthcare provider and tell them that you have or may have COVID-19. This will help the healthcare providers office take steps to keep other people from getting infected or exposed. Wear a facemask  You should wear a facemask when you are around other people (e.g., sharing a room or vehicle) or pets and before you enter a healthcare providers office. If you are not able to wear a facemask (for example, because it causes trouble breathing), then people who live with you should not stay in the same room with you; they should also wear a facemask if they enter your room. Cover your coughs and sneezes  Cover your mouth and nose with a tissue when you cough or sneeze. Throw used tissues in a lined trash can. Immediately wash your hands with soap and water for at least 20 seconds or, if soap and water are not available, clean your hands with an alcohol-based hand  that contains at least 60% alcohol. Clean your hands often  Wash your hands often with soap and water for at least 20 seconds, especially after blowing your nose, coughing, or sneezing; going to the bathroom; and before eating or preparing food. If soap and water are not readily available, use an alcohol-based hand  with at least 60% alcohol, covering all surfaces of your hands and rubbing them together until they feel dry. Soap and water are the best option if hands are visibly dirty. Avoid touching your eyes, nose, and mouth with unwashed hands.   Avoid sharing personal household items  You should not share dishes, drinking glasses, cups, eating utensils, towels, or bedding with other people or pets in your home. After using these items, they should be washed thoroughly with soap and water. Clean all high-touch surfaces everyday  High touch surfaces include counters, tabletops, doorknobs, bathroom fixtures, toilets, phones, keyboards, tablets, and bedside tables. Also, clean any surfaces that may have blood, stool, or body fluids on them. Use a household cleaning spray or wipe, according to the label instructions. Labels contain instructions for safe and effective use of the cleaning product including precautions you should take when applying the product, such as wearing gloves and making sure you have good ventilation during use of the product. Monitor your symptoms  Seek prompt medical attention if your illness is worsening (e.g., difficulty breathing). Before seeking care, call your healthcare provider and tell them that you have, or are being evaluated for, COVID-19. Put on a facemask before you enter the facility. These steps will help the healthcare providers office to keep other people in the office or waiting room from getting infected or exposed. Persons who are placed under active monitoring or facilitated self-monitoring should follow instructions provided by their local health department or occupational health professionals, as appropriate. When working with your local health department check their available hours. If you have a medical emergency and need to call 911, notify the dispatch personnel that you have, or are being evaluated for COVID-19. If possible, put on a facemask before emergency medical services arrive. Discontinuing home isolation  Patients with confirmed COVID-19 should remain under home isolation precautions until the risk of secondary transmission to others is thought to be low. The decision to discontinue home isolation precautions should be made on a case-by-case basis, in consultation with your physician and the health department. Please do NOT make this decision on your own. DUVED- keeps someone who might have been exposed to the virus away from others  Isolation  keeps someone who is infected with the virus away from others, even in their homes    Scenario 1    Your patient has close contact with an individual who has COVID-19. Your patient will not have further contact. Plan  Your patient is quarantined from the last day of contact for 14 days    Scenario 2   Your patient has lives with someone who has COVID-19 but can avoid further contact. Plan  Your patient is quarantined for 14 days starting when the person with COVID-19 begins home isolation. Scenario 3    Your patient is under quarantine and has additional close contact with someone else who has COVID-19. Plan  Your patient must restart quarantine from the last COVID-19 exposure. Scenario 4   Your patient lives with someone who has COVID-19 and cannot avoid close contact from them. Plan  Your patient is quarantined while the other person is isolating and for 14 days after covid  19 person meets the criteria to end home isolation. Treatment with monclonoal antibodies is under investigation and can be considered for patients with mild to moderate COVID-19 who are not on supplemental oxygen and are not hospitalized but who are at [address] [place] [address] for clinical progression have had onset of symptoms within the past 10 days. HIGH RISK is defined as patients who meet at least one of the following criteria:    Have a body mass index (BMI) ? 28    Have chronic kidney disease    Have diabetes    Have immunosuppressive disease    Are currently receiving immunosuppressive treatment    Are ?72years of age  Vandana Antony  Are ?54years of age AND have  *cardiovascular disease, OR  *hypertension, OR  *chronic obstructive pulmonary disease/other chronic respiratory disease.   Are 15 16years of age AND have  *BMI ? 85th percentile for their age and gender based on CDC growth charts, [email or web address] , OR  *sickle cell disease, OR  *congenital or acquired heart disease, OR  *neurodevelopmental disorders, for example, cerebral palsy, OR  *a medical-related technological dependence, for example, tracheostomy, gastrostomy, or positive pressure ventilation (not related to   COVID-19), OR  *asthma, reactive airway or other chronic respiratory disease that requires daily medication for control. Other risk factors:   Moderate/severe dementia   Cancer being treated with palliative care   Cirrhosis   Pregnancy   Breast feeding   Weight less than 40Kg (88lbs)      If your results of the COVID-19 test is NEGATIVE -     The patient may stop isolation, in consultation with your health care provider, typically when: Your healthcare provider has determined that the cause of the illness is NOT COVID-19 and approves your return to work. OR  Ten (10) days have passed since onset of symptoms AND one day (24 hours) have passed with no fever without taking medication (like Tylenol) to reduce fever,  respiratory symptoms have resolved and you have been evaluated by your health care provider. Please follow up with your physician for evaluation about this. COVID-19 EXPOSURE    Quarantine  Quarantine if you have been in close contact (within 6 feet of someone for a cumulative total of 15 minutes or more over a 24-hour period) with someone who has COVID-19, unless you have been fully vaccinated. People who are fully vaccinated do NOT need to quarantine after contact with someone who had COVID-19 unless they have symptoms. However, fully vaccinated people should get tested 3-5 days after their exposure, even if they dont have symptoms and wear a mask indoors in public for 14 days following exposure or until their test result is negative.     The following websites are the best places for up to date information on this fluid situation. MaleWeight.co.nz    The following applies to a person who has clinically recovered from  SARS-CoV-2 infection that was confirmed with a viral diagnostic test and then, within 3 months since the date of symptom onset of the previous illness episode (or date of positive viral diagnostic test if the person never experienced symptoms), is identified as a contact of a new case. If the person remains asymptomatic since the new exposure, then they do not need to be retested for SARS-CoV-2 and do not need to be quarantined. However, if the person experiences new symptoms consistent with COVID-19 and an evaluation fails to identify a diagnosis other than SARS-CoV-2 infection (e.g., influenza), then repeat viral diagnostic testing may be warranted, in consultation with an infectious disease specialist and public health authorities for isolation guidance.

## 2021-10-07 NOTE — LETTER
Chronic medical condition.  Continuing home medication.  Monitoring.       [facility]   [address] [facility] [address] [facility] [address]  Phone: [phone]  Fax: [phone]    RUDOLPH Burns CNP        October 7, 2021     Patient: Anitra Caba   YOB: 1980   Date of Visit: 10/7/2021       To Whom It May Concern: It is my medical opinion that [employer] should remain out of work until [place] results are back in 1-4 days, pending negative result. .    If you have any questions or concerns, please don't hesitate to call.     Sincerely,        RUDOLPH Burns CNP

## 2021-10-07 NOTE — PROGRESS NOTES
[facility] FAMILY MEDICINE   [address] [place] [address] [address]  Dept: [phone]  Dept Fax: [phone]    Chen Randall is a 42 y.o. female who presents to the urgent care today for her medical conditions/complaints as notedbelow. Chen Randall is c/o of Pharyngitis (onset for 4 days)      HPI:     42 yr old female presents for st x4d, occasional dry np cough  Fully covid vaccinated with J&J    Pharyngitis  This is a new problem. The current episode started in the past 7 days (x4d). The problem occurs constantly. The problem has been gradually worsening. Associated symptoms include coughing, fatigue, neck pain, a sore throat and swollen glands. Pertinent negatives include no abdominal pain, anorexia, arthralgias, change in bowel habit, chest pain, chills, congestion, diaphoresis, fever, headaches, joint swelling, myalgias, nausea, numbness, rash, urinary symptoms, vertigo, visual change, vomiting or weakness. The symptoms are aggravated by swallowing. She has tried nothing for the symptoms. The treatment provided no relief. Past Medical History:   Diagnosis Date    Carotid aneurysm, left (HCC)     [facility]A [facility]cerebral vascular accident) ([facility] [address]     History of ischemic middle cerebral artery stroke     Migraines     Stroke [facility])         Current Outpatient Medications   Medication Sig Dispense Refill    azithromycin (ZITHROMAX) 250 MG tablet Take 1 tablet by mouth See Admin Instructions for 5 days 500mg on day 1 followed by 250mg on days 2 - 5 6 tablet 0    fluticasone (FLONASE) 50 MCG/ACT nasal spray 2 sprays by Nasal route daily 16 g 0    predniSONE (DELTASONE) 20 MG tablet 3 tabs QD x 3 days then 2 tabs x 3 days then 1 tab QD x 3 days 18 tablet 0    Ubrogepant 100 MG TABS Take one at onset of migraine. May repeat in 2 hours.   No more than 2 in 24 hours and 4 in 1 week 10 tablet 5    amitriptyline (ELAVIL) 10 MG tablet Take 2 tablets by mouth nightly 180 tablet 3    amLODIPine (NORVASC) 5 MG tablet TAKE 1 TABLET BY MOUTH EVERY DAY 90 tablet 0    aspirin EC 81 MG EC tablet Take 1 tablet by mouth daily 90 tablet 3     Current Facility-Administered Medications   Medication Dose Route Frequency Provider Last Rate Last Admin    levonorgestrel (MIRENA) IUD 52 mg 1 each  1 each IntraUTERine Once Alisson Braun MD   1 each at 03/09/21 0806     Allergies   Allergen Reactions    Amoxicillin Swelling     lips    Penicillins Other (See Comments)     Pt. Unsure of reaction       Subjective:      Review of Systems   Constitutional: Positive for fatigue. Negative for chills, diaphoresis and fever. HENT: Positive for sore throat. Negative for congestion. Respiratory: Positive for cough. Cardiovascular: Negative for chest pain. Gastrointestinal: Negative for abdominal pain, anorexia, change in bowel habit, nausea and vomiting. Musculoskeletal: Positive for neck pain. Negative for arthralgias, joint swelling and myalgias. Skin: Negative for rash. Neurological: Negative for vertigo, weakness, numbness and headaches. All other systems reviewed and are negative. 14 systems reviewed and negative except as listed in HPI. Objective:     Physical Exam  Vitals and nursing note reviewed. Constitutional:       General: She is not in acute distress. Appearance: Normal appearance. She is well-developed. She is ill-appearing. She is not toxic-appearing or diaphoretic. HENT:      Head: Normocephalic and atraumatic. Right Ear: Tympanic membrane, ear canal and external ear normal.      Left Ear: Tympanic membrane, ear canal and external ear normal.      Nose: Congestion and rhinorrhea present. Mouth/Throat:      Pharynx: Oropharyngeal exudate and posterior oropharyngeal erythema present.       Comments: Pharynx injected  Bilateral tonsils enlarged and injected  Uvula midline no edema  Handling oral secretions without difficulty  Eyes: General: No scleral icterus. Right eye: No discharge. Left eye: No discharge. Conjunctiva/sclera: Conjunctivae normal.      Pupils: Pupils are equal, round, and reactive to light. Neck:      Thyroid: No thyromegaly. Trachea: No tracheal deviation. Cardiovascular:      Rate and Rhythm: Normal rate and regular rhythm. Pulses: Normal pulses. Heart sounds: Normal heart sounds. No murmur heard. No friction rub. No gallop. Pulmonary:      Effort: Pulmonary effort is normal. No respiratory distress. Breath sounds: Normal breath sounds. No stridor. No wheezing, rhonchi or rales. Abdominal:      General: Bowel sounds are normal. There is no distension. Palpations: Abdomen is soft. Tenderness: There is no abdominal tenderness. Musculoskeletal:         General: Normal range of motion. Cervical back: Normal range of motion and neck supple. Comments: Ambulated to and from room, gait steady, moving all ext without difficulty   Lymphadenopathy:      Cervical: Cervical adenopathy ( + tender yu ant cervical lymphadenopathy) present. Skin:     General: Skin is warm and dry. Capillary Refill: Capillary refill takes less than 2 seconds. Findings: No rash ( no rash to visible skin). Neurological:      General: No focal deficit present. Mental Status: She is alert and oriented to person, place, and time. Motor: No abnormal muscle tone. Coordination: Coordination normal.   Psychiatric:         Mood and Affect: Mood normal.         Behavior: Behavior normal.       /88 (Site: Left Upper Arm, Position: Sitting, Cuff Size: Medium Adult)   Pulse 75   Temp 98 °F (36.7 °C) (Infrared)   SpO2 95%     Assessment:       Diagnosis Orders   1. Sore throat  POCT rapid strep A    Strep A DNA probe, amplification    azithromycin (ZITHROMAX) 250 MG tablet    COVID-19   2. Cough  COVID-19   3.  Suspected COVID-19 virus infection         Plan: J&J covid vaccinated  POCT strep neg  Throat culture pending  Based on sx and clinical exam findings will start zpak  flonase for pnd  Work note  Reviewed over-the-counter treatments for symptom management. Will send out COVID19 testing. Possible treatment alterations based on the results. Patient instructed to self-quarantine until testing results are back. Patient instructed not to return to work until results are back. Tylenol as needed for fever/pain. Encouraged adequate hydration and rest.  The patient indicates understanding of these issues and agrees with the plan. Educational materials provided on AVS.  Follow up if symptoms do not improve/worsen. Discussed symptoms that will warrant urgent ED evaluation/treatment. Return if symptoms worsen or fail to improve, for Make an Appt. with your Primary Care in 1 week. Orders Placed This Encounter   Medications    azithromycin (ZITHROMAX) 250 MG tablet     Sig: Take 1 tablet by mouth See Admin Instructions for 5 days 500mg on day 1 followed by 250mg on days 2 - 5     Dispense:  6 tablet     Refill:  0    fluticasone (FLONASE) 50 MCG/ACT nasal spray     Si sprays by Nasal route daily     Dispense:  16 g     Refill:  0         Patient given educational materials - see patient instructions. Discussed use, benefit, and side effects of prescribed medications. All patient questions answered. Pt voicedunderstanding.     Electronically signed by RUDOLPH Burns CNP on 10/7/2021 at 9:52 AM

## 2021-10-07 NOTE — TELEPHONE ENCOUNTER
Contacted the patient yesterday when I got the results that her head CT was negative.   It is under result notes

## 2021-10-08 LAB
DIRECT EXAM: NORMAL
Lab: NORMAL
SARS-COV-2: NORMAL
SARS-COV-2: NOT DETECTED
SOURCE: NORMAL
SPECIMEN DESCRIPTION: NORMAL

## 2021-10-19 ENCOUNTER — TELEPHONE (OUTPATIENT)
Dept: NEUROLOGY | Age: 41
End: 2021-10-19

## 2021-11-19 ENCOUNTER — OFFICE VISIT (OUTPATIENT)
Dept: FAMILY MEDICINE CLINIC | Age: 41
End: 2021-11-19
Payer: COMMERCIAL

## 2021-11-19 VITALS
HEART RATE: 68 BPM | WEIGHT: 203 LBS | DIASTOLIC BLOOD PRESSURE: 78 MMHG | OXYGEN SATURATION: 99 % | SYSTOLIC BLOOD PRESSURE: 118 MMHG | TEMPERATURE: 98.1 F | BODY MASS INDEX: 37.13 KG/M2

## 2021-11-19 DIAGNOSIS — E78.2 MIXED HYPERLIPIDEMIA: ICD-10-CM

## 2021-11-19 DIAGNOSIS — I10 ESSENTIAL HYPERTENSION: Primary | ICD-10-CM

## 2021-11-19 PROCEDURE — 99214 OFFICE O/P EST MOD 30 MIN: CPT | Performed by: STUDENT IN AN ORGANIZED HEALTH CARE EDUCATION/TRAINING PROGRAM

## 2021-11-19 RX ORDER — AMLODIPINE BESYLATE 5 MG/1
TABLET ORAL
Qty: 90 TABLET | Refills: 2 | Status: SHIPPED | OUTPATIENT
Start: 2021-11-19 | End: 2022-09-21

## 2021-11-19 SDOH — ECONOMIC STABILITY: FOOD INSECURITY: WITHIN THE PAST 12 MONTHS, THE FOOD YOU BOUGHT JUST DIDN'T LAST AND YOU DIDN'T HAVE MONEY TO GET MORE.: NEVER TRUE

## 2021-11-19 SDOH — ECONOMIC STABILITY: FOOD INSECURITY: WITHIN THE PAST 12 MONTHS, YOU WORRIED THAT YOUR FOOD WOULD RUN OUT BEFORE YOU GOT MONEY TO BUY MORE.: NEVER TRUE

## 2021-11-19 ASSESSMENT — ENCOUNTER SYMPTOMS
ABDOMINAL PAIN: 0
VOMITING: 0
NAUSEA: 0
COLOR CHANGE: 0
TROUBLE SWALLOWING: 0

## 2021-11-19 ASSESSMENT — PATIENT HEALTH QUESTIONNAIRE - PHQ9
1. LITTLE INTEREST OR PLEASURE IN DOING THINGS: 0
SUM OF ALL RESPONSES TO PHQ QUESTIONS 1-9: 0
2. FEELING DOWN, DEPRESSED OR HOPELESS: 0
SUM OF ALL RESPONSES TO PHQ QUESTIONS 1-9: 0
SUM OF ALL RESPONSES TO PHQ9 QUESTIONS 1 & 2: 0
SUM OF ALL RESPONSES TO PHQ QUESTIONS 1-9: 0

## 2021-11-19 ASSESSMENT — SOCIAL DETERMINANTS OF HEALTH (SDOH): HOW HARD IS IT FOR YOU TO PAY FOR THE VERY BASICS LIKE FOOD, HOUSING, MEDICAL CARE, AND HEATING?: NOT HARD AT ALL

## 2021-11-19 NOTE — PROGRESS NOTES
Subjective:  Rd Olivas presents for   Chief Complaint   Patient presents with    Medication Refill       Here presenting for HTN followup and medication refills. Denies any issues or complaints. Patient Active Problem List   Diagnosis    Dysmenorrhea    Pelvic pain    Intractable migraine without aura and without status migrainosus    Cerebral aneurysm rupture (HCC)    Aneurysm of left internal carotid artery    S/P cerebral aneurysm repair    Cerebral infarction due to occlusion of left internal carotid artery ([address]    Cerebral aneurysm    IUD (intrauterine device) in place    History of ischemic middle cerebral artery stroke         Review of Systems   Constitutional: Negative for appetite change, chills, fatigue and fever. HENT: Negative for congestion and trouble swallowing. Cardiovascular: Negative. Gastrointestinal: Negative for abdominal pain, nausea and vomiting. Genitourinary: Negative for difficulty urinating, dysuria and flank pain. Musculoskeletal: Negative for arthralgias. Skin: Negative for color change. Neurological: Negative for dizziness and headaches. Psychiatric/Behavioral: Negative for behavioral problems and confusion. The patient is not nervous/anxious. Objective:  Physical Exam   Vitals:   Vitals:    11/19/21 1126   BP: 118/78   Site: Right Upper Arm   Position: Sitting   Cuff Size: Medium Adult   Pulse: 68   Temp: 98.1 °F (36.7 °C)   TempSrc: Temporal   SpO2: 99%   Weight: 203 lb (92.1 kg)     Wt Readings from Last 3 Encounters:   11/19/21 203 lb (92.1 kg)   10/05/21 198 lb (89.8 kg)   05/18/21 206 lb (93.4 kg)     Ht Readings from Last 3 Encounters:   10/05/21 5' 2\" (1.575 m)   05/18/21 5' 2\" (1.575 m)   04/06/21 5' 2\" (1.575 m)     Body mass index is 37.13 kg/m². Physical Exam  Vitals reviewed. Constitutional:       General: She is not in acute distress. Appearance: Normal appearance.    HENT:      Mouth/Throat:      Mouth: Mucous membranes are moist.   Eyes:      Conjunctiva/sclera: Conjunctivae normal.   Cardiovascular:      Rate and Rhythm: Normal rate and regular rhythm. Heart sounds: Normal heart sounds. Pulmonary:      Effort: Pulmonary effort is normal.      Breath sounds: Normal breath sounds. Abdominal:      General: Abdomen is flat. Bowel sounds are normal.      Palpations: Abdomen is soft. Skin:     General: Skin is warm and dry. Neurological:      Mental Status: She is alert and oriented to person, place, and time. Psychiatric:         Mood and Affect: Mood normal.            Assessment/Plan:    Diagnosis Orders   1. Essential hypertension  amLODIPine (NORVASC) 5 MG tablet    CBC With Auto Differential    Comprehensive Metabolic Panel   2. Mixed hyperlipidemia  Lipid Panel        Return in about 1 year (around 11/19/2022), or if symptoms worsen or fail to improve. Will obtain labs for cmp, cbc, and lipid panel. Will also refill her amlodipine 5 mg. She is doing well on this without issues.

## 2022-01-06 ENCOUNTER — OFFICE VISIT (OUTPATIENT)
Dept: NEUROLOGY | Age: 42
End: 2022-01-06
Payer: COMMERCIAL

## 2022-01-06 ENCOUNTER — TELEPHONE (OUTPATIENT)
Dept: NEUROLOGY | Age: 42
End: 2022-01-06

## 2022-01-06 VITALS
RESPIRATION RATE: 16 BRPM | SYSTOLIC BLOOD PRESSURE: 121 MMHG | BODY MASS INDEX: 36.32 KG/M2 | WEIGHT: 205 LBS | DIASTOLIC BLOOD PRESSURE: 85 MMHG | HEIGHT: 63 IN | HEART RATE: 66 BPM

## 2022-01-06 DIAGNOSIS — G43.019 INTRACTABLE MIGRAINE WITHOUT AURA AND WITHOUT STATUS MIGRAINOSUS: ICD-10-CM

## 2022-01-06 DIAGNOSIS — Z86.73 HISTORY OF ISCHEMIC MIDDLE CEREBRAL ARTERY STROKE: ICD-10-CM

## 2022-01-06 DIAGNOSIS — Z86.79 HISTORY OF ANEURYSM INVOLVING NERVOUS SYSTEM: Primary | ICD-10-CM

## 2022-01-06 PROCEDURE — 99214 OFFICE O/P EST MOD 30 MIN: CPT | Performed by: NURSE PRACTITIONER

## 2022-01-06 RX ORDER — FREMANEZUMAB-VFRM 225 MG/1.5ML
225 INJECTION SUBCUTANEOUS
Qty: 1 PEN | Refills: 5 | Status: SHIPPED | OUTPATIENT
Start: 2022-01-06

## 2022-01-06 NOTE — PROGRESS NOTES
[facility]            [place], [address]          [facility], [address] [facility]          Dept: [phone]          Dept Fax: [phone]    MD Tahnh Ramirez MD Ahmed B. Merced Brink, MD Domenick Dash, MD Jacqui Kuster, CNP            1/6/2022      HISTORY OF PRESENT ILLNESS:       I had the pleasure of seeing Elis Linder, who returns for continuing neurologic care. 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 For prophylaxis of her migraine headaches she is prescribed amitriptyline. For abortive therapy she is prescribed ubrelvy. The patient was also given a steroid taper at her last visit. She reports that the steroid taper was ineffective in providing her with relief. She is currently getting 1-2 migraine headaches/week that last for multiple days. She has not been taking ubrelvy for abortive therapy as she was unaware it was approved by her insurance. Headache location: bitemporal  Headache quality: pounding and throbbing  Associated factors:  nausea, vomiting, Photophobia, Phonophobia  Intensity: 7-8/10  Headache chronicity: ~25 years  Headache frequency: 1-2 headaches/week that last for multiple days, total migraine days/month 15-20  Aggravating factors : bright lights, loud sounds, contraceptive patch change  Relieving factors: rest  Prophylactic medications: Amitriptyline  Abortive medications: tylenol, advil  Previously used medications: Imitrex, Topamax, Toprol, Maxalt, emgality, steroid taper    The patient also has a history of a 5 mm aneurysm of left paraopthalmic ICA status post elective coiling. For management she is prescribed aspirin 81 mg daily.  The patient felt as if she was having a recurrent aneurysm at her last visit and a CT of her head without contrast was ordered which was normal. The patient previously followed with Dr. Ryan Moser and reports that he released her at her last visit. The patient still feels as if she is having a recurrent aneurysm. Testing reviewed:    CT Head WO Contrast 10/6/2021  Impression   No acute intracranial abnormality. IR Angiogram Carotid Cerebral Bilateral   Impression:    --The left superior hypophyseal aneurysm is completely obliterated, Asad score 1. There are previously placed flow diverter device and stent from the proximal left cavernous ICA to the paraophthalmic ICA. The stent widely patent with good wall    apposition.       CTA Head Neck W Contrast 1/15/2021  Impression:  1. No acute intracranial abnormality. 2. Saccular 5 mm aneurysm arising in the region of the left paraophthalmic   ICA. 3. Otherwise, unremarkable CTA of the head and neck.    These results were sent to the [facility] [address] ([address] [place] [address]) on   1/15/2021 at 11:52 am to be communicated to the referring/covering health   care provider/office.         -MRI brain 1/26/21      Impression   Scattered foci of acute infarctions in the left frontal, parietal, temporal   and occipital lobes and in the left basal ganglia/insula cortex.  Associated   local mass effect without midline shift.       Occlusion of the left internal carotid artery starting from is origin,   extending to the ICA terminus.  This finding is acute and new since January   15, 2021.       Remainder of the major arteries of the head and neck are patent without   flow-limiting stenosis.       The left MCA and left ROSEANN are likely supplied by patent anterior   communicating artery.       Critical results were reported to Dr. Lamar Dobson at 3:49 p.m. on January 26,            PAST MEDICAL HISTORY:         Diagnosis Date    Carotid aneurysm, left ([address]     CVA (cerebral vascular accident) ([address]     History of ischemic middle cerebral artery stroke     Migraines     Stroke ([address]         PAST SURGICAL HISTORY:         Procedure Laterality Date    CARDIAC CATHETERIZATION  01/25/2021    IR ANGIOGRAM CAROTID CEREBRAL BILATERAL BRANDY SURPASS EVOLVE ANEURYSM EMBOLIZATION SYSTEM MRI CONDITIONAL 3T OK. SAFE IMMEDIATELY POST IMPLANT.  CARDIAC CATHETERIZATION      IR ANGIOGRAM CAROTID CEREBRAL BILATERAL    CARDIAC CATHETERIZATION  01/25/2021    IR ANGIOGRAM CAROTID CEREBRAL BILATERAL    INTRAUTERINE DEVICE INSERTION  03/09/2021    Mirena IUD Karlos Paige 47 [id number] Lot [id number] Exp May 2023    OPEN REPAIR PERIPHERAL ANEURYSM      brain aneurysm repair    OTHER SURGICAL HISTORY  05/18/2021    cerebral angiogram        SOCIAL HISTORY:     Social History     Socioeconomic History    Marital status: Single     Spouse name: Not on file    Number of children: Not on file    Years of education: Not on file    Highest education level: Not on file   Occupational History    Not on file   Tobacco Use    Smoking status: Never Smoker    Smokeless tobacco: Never Used   Vaping Use    Vaping Use: Never used   Substance and Sexual Activity    Alcohol use: Yes     Comment: 3 glasses weekly    Drug use: Not Currently     Types: Marijuana Rondi Baba)    Sexual activity: Yes     Partners: Male   Other Topics Concern    Not on file   Social History Narrative    Not on file     Social Determinants of Health     Financial Resource Strain: Low Risk     Difficulty of Paying Living Expenses: Not hard at all   Food Insecurity: No Food Insecurity    Worried About Running Out of Food in the Last Year: Never true    Annabel of Food in the Last Year: Never true   Transportation Needs:     Lack of Transportation (Medical): Not on file    Lack of Transportation (Non-Medical):  Not on file   Physical Activity:     Days of Exercise per Week: Not on file    Minutes of Exercise per Session: Not on file   Stress:     Feeling of Stress : Not on file   Social Connections:     Frequency of Communication with Friends and Family: Not on file    Frequency of Social Palpitations  [] Lightheaded   GI [] Constipation  [] Diarrhea  [] Swallowing change  [x] Nausea/vomiting    [] Urinary Frequency  [] Urinary Urgency   Musculoskeletal [] Neck pain  [] Back pain  [] Muscle pain  [] Restless legs   Dermatologic [] Skin changes   Neurologic [] Memory loss/confusion  [] Seizures  [] Trouble walking or imbalance  [] Dizziness  [] Sleep disturbance  [] Weakness  [] Numbness  [] Tremors  [] Speech Difficulty  [x] Headaches  [x] Light Sensitivity  [x] Sound Sensitivity   Endocrinology []Excessive thirst  []Excessive hunger   Psychiatric [] Anxiety/Depression  [] Hallucination   Allergy/immunology []Hives/environmental allergies   Hematologic/lymph [] Abnormal bleeding  [] Abnormal bruising         PHYSICAL EXAMINATION:       Vitals:    01/06/22 1138   BP: 121/85   Pulse: 66   Resp: 16                                              .                                                                                                    General Appearance:  Alert, cooperative, no signs of distress, appears stated age   Head:  Normocephalic, no signs of trauma   Eyes:  Conjunctiva/corneas clear;  eyelids intact   Ears:  Normal external ear and canals   Nose: Nares normal, mucosa normal, no drainage    Throat: Lips and tongue normal; teeth normal;  gums normal   Neck: Supple, intact flexion, extension and rotation;   trachea midline;  no adenopathy;   thyroid: not enlarged;   no carotid pulse abnormality   Back:   Symmetric, no curvature, ROM adequate   Lungs:   Respirations unlabored   Heart:  Regular rate and rhythm           Extremities: Extremities normal, no cyanosis, no edema   Pulses: Symmetric over head and neck   Skin: Skin color, texture normal, no rashes, no lesions                                     NEUROLOGIC EXAMINATION    Neurologic Exam  Mental status    Alert and oriented x 3; intact memory with no confusion, speech or language problems; no hallucinations or delusions  Fund of information appropriate for level of education    Cranial nerves    II - visual fields intact to confrontation bilaterally  III, IV, VI  extra-ocular muscles full: no pupillary defect; no MANASA, no nystagmus, no ptosis   V - normal facial sensation                                                               VII - normal facial symmetry                                                             VIII - intact hearing                                                                             IX, X - symmetrical palate                                                                  XI - symmetrical shoulder shrug                                                       XII - tongue midline without atrophy or fasciculation      Motor function  Normal muscle bulk and tone; strength 5/5 on all 4 extremities, no pronator drift      Sensory function Intact to light touch, pinprick, vibration, proprioception on all 4 extremities      Cerebellar Intact fine motor movement. No involuntary movements or tremors. No ataxia or dysmetria on finger to nose or heel to shin testing      Reflex function DTR 2+ on bilateral UE and LE, symmetric. Down going toes bilaterally      Gait                   normal base and arm swing                  Medical Decision Making: In summary, your patient, Donita Moreno exhibits the following, with associated plan:    1. Migraine headaches without aura.  Currently 1-2 headaches/week that last for multiple days, total migraine days/month is currently 15-20. Previous medication include topamax, toprol and emgality. 1. Continue  amitriptyline to 20 mg at bedtime daily  2. Start Venetta Piety for abortive therapy, this medication was prescribed at her last visit however the patient did not start taking prior to today's visit. She was recommended to start this medication at today's visit and verbalized understanding.    3. Start ajovy 225 mg every 30 days for prophylaxis, if the patient's migraine headaches are not improved at her next visit we will discuss starting botox injections  4. Continue Ibuprofen and Tylenol as needed  5. She has previously tried imitrex and maxalt which did not provide her with relief. Triptans are also contraindicated as she has a previous aneurysm and a previous stroke. 6. The patient should return for reevaluation in 2 months  2. Previous history of 5 mm aneurysm of left paraopthalmic ICA, underwent elective coiling on 1/25/21, subsequent left middle cerebral artery stroke secondary to occlusion of the left internal carotid artery status post mechanical thrombectomy. The patient reports today that she feels as if she may have a recurrent aneurysm. 1. Continue Aspirin 81 mg daily  2. CT of the head WO contrast was normal  3. Obtain CTA head neck w contrast as the patient is still worried she may have a recurrent aneurysm  4. The patient was released by Dr. Froilan Mondragon at her last visit  5. Return for follow up visit in 3 months              Signed: Franklin Hernandez CNP      *Please note that portions of this note were completed with a voice recognition program.  Although every effort was made to insure the accuracy of this automated transcription, some errors in transcription may have occurred, occasionally words and are mis-transcribed    Provider Attestation: The documentation recorded by the scribe accurately reflects the service I personally performed and the decisions made by myself. Portions of this note were transcribed by a scribe. I personally performed the history, physical exam, and medical decision-making and confirm the accuracy of the information in the transcribed note. Scribe Attestation:   By signing my name below, Ivette Moore, [email or web address] that this documentation has been prepared under the direction and in the presence of Franklin Hernandez CNP.

## 2022-01-21 ENCOUNTER — HOSPITAL ENCOUNTER (OUTPATIENT)
Dept: CT IMAGING | Age: 42
Discharge: HOME | End: 2022-01-23
Payer: COMMERCIAL

## 2022-01-21 DIAGNOSIS — Z86.79 HISTORY OF ANEURYSM INVOLVING NERVOUS SYSTEM: ICD-10-CM

## 2022-01-21 PROCEDURE — 70496 CT ANGIOGRAPHY HEAD: CPT

## 2022-01-21 PROCEDURE — 6360000004 HC RX CONTRAST MEDICATION: Performed by: NURSE PRACTITIONER

## 2022-01-21 PROCEDURE — 2580000003 HC RX 258: Performed by: NURSE PRACTITIONER

## 2022-01-21 RX ORDER — 0.9 % SODIUM CHLORIDE 0.9 %
80 INTRAVENOUS SOLUTION INTRAVENOUS ONCE
Status: COMPLETED | OUTPATIENT
Start: 2022-01-21 | End: 2022-01-21

## 2022-01-21 RX ORDER — SODIUM CHLORIDE 0.9 % (FLUSH) 0.9 %
10 SYRINGE (ML) INJECTION PRN
Status: DISCONTINUED | OUTPATIENT
Start: 2022-01-21 | End: 2022-01-24 | Stop reason: HOSPADM

## 2022-01-21 RX ADMIN — SODIUM CHLORIDE, PRESERVATIVE FREE 10 ML: 5 INJECTION INTRAVENOUS at 12:08

## 2022-01-21 RX ADMIN — IOPAMIDOL 100 ML: 755 INJECTION, SOLUTION INTRAVENOUS at 12:08

## 2022-01-21 RX ADMIN — SODIUM CHLORIDE 80 ML: 9 INJECTION, SOLUTION INTRAVENOUS at 12:08

## 2022-03-10 ENCOUNTER — OFFICE VISIT (OUTPATIENT)
Dept: NEUROLOGY | Age: 42
End: 2022-03-10
Payer: COMMERCIAL

## 2022-03-10 ENCOUNTER — HOSPITAL ENCOUNTER (OUTPATIENT)
Age: 42
Setting detail: SPECIMEN
Discharge: HOME | End: 2022-03-10

## 2022-03-10 VITALS
HEIGHT: 63 IN | HEART RATE: 71 BPM | BODY MASS INDEX: 35.97 KG/M2 | WEIGHT: 203 LBS | SYSTOLIC BLOOD PRESSURE: 115 MMHG | DIASTOLIC BLOOD PRESSURE: 80 MMHG

## 2022-03-10 DIAGNOSIS — Z86.73 HISTORY OF ISCHEMIC MIDDLE CEREBRAL ARTERY STROKE: ICD-10-CM

## 2022-03-10 DIAGNOSIS — I67.9 CEREBRAL VASCULAR DISEASE: Primary | ICD-10-CM

## 2022-03-10 DIAGNOSIS — E78.2 MIXED HYPERLIPIDEMIA: ICD-10-CM

## 2022-03-10 DIAGNOSIS — I10 ESSENTIAL HYPERTENSION: ICD-10-CM

## 2022-03-10 DIAGNOSIS — Z86.79 S/P CEREBRAL ANEURYSM REPAIR: ICD-10-CM

## 2022-03-10 DIAGNOSIS — Z98.890 S/P CEREBRAL ANEURYSM REPAIR: ICD-10-CM

## 2022-03-10 DIAGNOSIS — Z86.79 HISTORY OF ANEURYSM INVOLVING NERVOUS SYSTEM: ICD-10-CM

## 2022-03-10 DIAGNOSIS — G43.019 INTRACTABLE MIGRAINE WITHOUT AURA AND WITHOUT STATUS MIGRAINOSUS: ICD-10-CM

## 2022-03-10 LAB
ABSOLUTE EOS #: 0.27 K/UL (ref 0–0.44)
ABSOLUTE IMMATURE GRANULOCYTE: <0.03 K/UL (ref 0–0.3)
ABSOLUTE LYMPH #: 3.44 K/UL (ref 1.1–3.7)
ABSOLUTE MONO #: 0.71 K/UL (ref 0.1–1.2)
ALBUMIN SERPL-MCNC: 4.3 G/DL (ref 3.5–5.2)
ALBUMIN/GLOBULIN RATIO: 1.4 (ref 1–2.5)
ALP BLD-CCNC: 67 U/L (ref 35–104)
ALT SERPL-CCNC: 8 U/L (ref 5–33)
ANION GAP SERPL CALCULATED.3IONS-SCNC: 12 MMOL/L (ref 9–17)
AST SERPL-CCNC: 12 U/L
BASOPHILS # BLD: 1 % (ref 0–2)
BASOPHILS ABSOLUTE: 0.04 K/UL (ref 0–0.2)
BILIRUB SERPL-MCNC: 0.74 MG/DL (ref 0.3–1.2)
BUN BLDV-MCNC: 7 MG/DL (ref 6–20)
CALCIUM SERPL-MCNC: 9.4 MG/DL (ref 8.6–10.4)
CHLORIDE BLD-SCNC: 105 MMOL/L (ref 98–107)
CHOLESTEROL/HDL RATIO: 3.4
CHOLESTEROL: 209 MG/DL
CO2: 23 MMOL/L (ref 20–31)
CREAT SERPL-MCNC: 0.66 MG/DL (ref 0.5–0.9)
EOSINOPHILS RELATIVE PERCENT: 4 % (ref 1–4)
GFR AFRICAN AMERICAN: >60 ML/MIN
GFR NON-AFRICAN AMERICAN: >60 ML/MIN
GFR SERPL CREATININE-BSD FRML MDRD: NORMAL ML/MIN/{1.73_M2}
GLUCOSE BLD-MCNC: 84 MG/DL (ref 70–99)
HCT VFR BLD CALC: 41.1 % (ref 36.3–47.1)
HDLC SERPL-MCNC: 61 MG/DL
HEMOGLOBIN: 13.7 G/DL (ref 11.9–15.1)
IMMATURE GRANULOCYTES: 0 %
LDL CHOLESTEROL: 138 MG/DL (ref 0–130)
LYMPHOCYTES # BLD: 44 % (ref 24–43)
MCH RBC QN AUTO: 29.8 PG (ref 25.2–33.5)
MCHC RBC AUTO-ENTMCNC: 33.3 G/DL (ref 28.4–34.8)
MCV RBC AUTO: 89.5 FL (ref 82.6–102.9)
MONOCYTES # BLD: 9 % (ref 3–12)
NRBC AUTOMATED: 0 PER 100 WBC
PDW BLD-RTO: 12.9 % (ref 11.8–14.4)
PLATELET # BLD: 405 K/UL (ref 138–453)
PMV BLD AUTO: 10.8 FL (ref 8.1–13.5)
POTASSIUM SERPL-SCNC: 4.8 MMOL/L (ref 3.7–5.3)
RBC # BLD: 4.59 M/UL (ref 3.95–5.11)
SEG NEUTROPHILS: 42 % (ref 36–65)
SEGMENTED NEUTROPHILS ABSOLUTE COUNT: 3.17 K/UL (ref 1.5–8.1)
SODIUM BLD-SCNC: 140 MMOL/L (ref 135–144)
TOTAL PROTEIN: 7.4 G/DL (ref 6.4–8.3)
TRIGL SERPL-MCNC: 48 MG/DL
WBC # BLD: 7.6 K/UL (ref 3.5–11.3)

## 2022-03-10 PROCEDURE — 99214 OFFICE O/P EST MOD 30 MIN: CPT | Performed by: NURSE PRACTITIONER

## 2022-03-10 RX ORDER — AMITRIPTYLINE HYDROCHLORIDE 25 MG/1
50 TABLET, FILM COATED ORAL NIGHTLY
Qty: 60 TABLET | Refills: 5 | Status: SHIPPED | OUTPATIENT
Start: 2022-03-10 | End: 2022-09-14

## 2022-03-10 NOTE — PROGRESS NOTES
[facility]            [address]          [facility], [address] [facility]          Dept: [phone]          Dept Fax: [phone]    MD Benji Paz MD Ahmed B. Kaylee Rily, MD Ann Coral, MD Ric Loft, CNP            3/10/2022      HISTORY OF PRESENT ILLNESS:       I had the pleasure of seeing Sri Lee, who returns for continuing neurologic care. 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 For abortive therapy she was prescribed ubrelvy. She is here today reporting that she has received her ajovy injection which has reduced her migraine frequency to 1-2 headaches/week. She has received two ajovy injections and has not noticed any side effects. When she has a headache she is able to easily abort it with the use of ubrelvy. She has also been having new onset dizziness which is associated with visual difficulties. Her visual difficulties are described as blurred vision, if she covers her left eye she will notice resolution but if she covers her right eye she will experience a decrease in her visual field. She is unsure if these new episodes are typically associated with a headache.      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 The patient had a CTA of her head and neck completed in January 2022 which only showed her previous stent which is patent and normal. For secondary stroke prevention she is prescribed aspirin 81 mg daily. She has a previous normal CT of the head without contrast and was released by Dr. Aviva Chu, neuro-endovascular. Testing reviewed:    CTA Head Neck W contrast 1/21/2022  Impression   Stent within the cavernous and supraclinoid portion of the left internal   carotid artery that appears patent.       Otherwise unremarkable CTA head and neck. CT Head WO Contrast 10/6/2021  Impression   No acute intracranial abnormality.      IR Angiogram Carotid Cerebral Bilateral   Impression:    --The left superior hypophyseal aneurysm is completely obliterated, Asad score 1. There are previously placed flow diverter device and stent from the proximal left cavernous ICA to the paraophthalmic ICA. The stent widely patent with good wall    apposition.       CTA Head Neck W Contrast 1/15/2021  Impression:  1. No acute intracranial abnormality. 2. Saccular 5 mm aneurysm arising in the region of the left paraophthalmic   ICA. 3. Otherwise, unremarkable CTA of the head and neck.    These results were sent to the [employer] [address] ([address] [place] [address]) on   1/15/2021 at 11:52 am to be communicated to the referring/covering health   care provider/office.         -MRI brain 1/26/21      Impression   Scattered foci of acute infarctions in the left frontal, parietal, temporal   and occipital lobes and in the left basal ganglia/insula cortex.  Associated   local mass effect without midline shift.       Occlusion of the left internal carotid artery starting from is origin,   extending to the ICA terminus.  This finding is acute and new since January   15, 2021.       Remainder of the major arteries of the head and neck are patent without   flow-limiting stenosis.       The left MCA and left ROSEANN are likely supplied by patent anterior   communicating artery.       Critical results were reported to Dr. Detxer Daniel at 3:49 p.m. on January 26,          PAST MEDICAL HISTORY:         Diagnosis Date    Carotid aneurysm, left ([address]     CVA (cerebral vascular accident) ([address]     History of ischemic middle cerebral artery stroke     Migraines     Stroke [address] [address]         PAST SURGICAL HISTORY:         Procedure Laterality Date    CARDIAC CATHETERIZATION  01/25/2021    IR ANGIOGRAM CAROTID CEREBRAL BILATERAL BRANDY SURPASS EVOLVE ANEURYSM EMBOLIZATION SYSTEM MRI CONDITIONAL 3T OK. SAFE IMMEDIATELY POST IMPLANT.     CARDIAC CATHETERIZATION      IR ANGIOGRAM CAROTID CEREBRAL BILATERAL    CARDIAC CATHETERIZATION  01/25/2021    IR ANGIOGRAM CAROTID CEREBRAL BILATERAL    INTRAUTERINE DEVICE INSERTION  03/09/2021    Mirena IUD [address] [id number] Lot [id number] Exp May 2023    OPEN REPAIR PERIPHERAL ANEURYSM      brain aneurysm repair    OTHER SURGICAL HISTORY  05/18/2021    cerebral angiogram        SOCIAL HISTORY:     Social History     Socioeconomic History    Marital status: Single     Spouse name: Not on file    Number of children: Not on file    Years of education: Not on file    Highest education level: Not on file   Occupational History    Not on file   Tobacco Use    Smoking status: Never Smoker    Smokeless tobacco: Never Used   Vaping Use    Vaping Use: Never used   Substance and Sexual Activity    Alcohol use: Yes     Comment: 3 glasses weekly    Drug use: Not Currently     Types: Marijuana Jayla Nicole    Sexual activity: Yes     Partners: Male   Other Topics Concern    Not on file   Social History Narrative    Not on file     Social Determinants of Health     Financial Resource Strain: Low Risk     Difficulty of Paying Living Expenses: Not hard at all   Food Insecurity: No Food Insecurity    Worried About Running Out of Food in the Last Year: Never true    [address] in the Last Year: Never true   Transportation Needs:     Lack of Transportation (Medical): Not on file    Lack of Transportation (Non-Medical): Not on file   Physical Activity:     Days of Exercise per Week: Not on file    Minutes of Exercise per Session: Not on file   Stress:     Feeling of Stress : Not on file   Social Connections:     Frequency of Communication with Friends and Family: Not on file    Frequency of Social Gatherings with Friends and Family: Not on file    Attends Episcopal Services: Not on file    Active Member of [address] [place] [address] [employer] or Organizations: Not on file    Attends Club or Organization Meetings: Not on file    Marital Status: Not on file   Intimate Partner Violence:     Fear of Current or Ex-Partner: Not on file    Emotionally Abused: Not on file    Physically Abused: Not on file    Sexually Abused: Not on file   Housing Stability:     Unable to Pay for Housing in the Last Year: Not on file    Number of [place] in the Last Year: Not on file    Unstable Housing in the Last Year: Not on file       CURRENT MEDICATIONS:     Current Outpatient Medications   Medication Sig Dispense Refill    Ubrogepant 100 MG TABS Take one at onset of migraine. May repeat in 2 hours.   No more than 2 in 24 hours and 4 in 1 week 10 tablet 5    amitriptyline (ELAVIL) 25 MG tablet Take 2 tablets by mouth nightly 60 tablet 5    ASPIRIN LOW DOSE 81 MG EC tablet TAKE 1 TABLET BY MOUTH EVERY DAY 90 tablet 3    Fremanezumab-vfrm (AJOVY) 225 MG/1.5ML SOAJ Inject 225 mg into the skin every 30 days 1 pen 5    amLODIPine (NORVASC) 5 MG tablet TAKE 1 Normal external ear and canals   Nose: Nares normal, mucosa normal, no drainage    Throat: Lips and tongue normal; teeth normal;  gums normal   Neck: Supple, intact flexion, extension and rotation;   trachea midline;  no adenopathy;   thyroid: not enlarged;   no carotid pulse abnormality   Back:   Symmetric, no curvature, ROM adequate   Lungs:   Respirations unlabored   Heart:  Regular rate and rhythm           Extremities: Extremities normal, no cyanosis, no edema   Pulses: Symmetric over head and neck   Skin: Skin color, texture normal, no rashes, no lesions                                     NEUROLOGIC EXAMINATION    Neurologic Exam  Mental status    Alert and oriented x 3; intact memory with no confusion, speech or language problems; no hallucinations or delusions  Fund of information appropriate for level of education    Cranial nerves    II - visual fields intact to confrontation bilaterally  III, IV, VI - extra-ocular muscles full: no pupillary defect; no MANASA, no nystagmus, no ptosis   V - normal facial sensation                                                               VII - normal facial symmetry                                                             VIII - intact hearing                                                                             IX, X - symmetrical palate                                                                  XI - symmetrical shoulder shrug                                                       XII - tongue midline without atrophy or fasciculation      Motor function  Normal muscle bulk and tone; strength 5/5 on all 4 extremities, no pronator drift      Sensory function Intact to light touch, pinprick, vibration, proprioception on all 4 extremities      Cerebellar Intact fine motor movement. No involuntary movements or tremors. No ataxia or dysmetria on finger to nose or heel to shin testing      Reflex function DTR 2+ on bilateral UE and LE, symmetric.  Down going toes bilaterally      Gait                   normal base and arm swing                  Medical Decision Making: In summary, your patient, Travis Tinsley exhibits the following, with associated plan:      1. Migraine headaches without aura.  Currently improved with the use of ajovy getting 1-2 headaches/week that no longer last for multiple days however she has been having episodes concerning for possible complex/occular migraines, although her previous left ophthalmic artery aneurysm raises concern for recurrent symptoms. Previous medication include topamax, toprol and emgality. 1. Increase amitriptyline to 50 mg at bedtime daily thereafter   2. Continue ubrelvy for abortive therapy  3. Obtain MRI of the brain with and without contrast  4. Continue ajovy 225 mg every 30 days for prophylaxis  5. Continue Ibuprofen and Tylenol as needed  6. She has previously tried imitrex and maxalt which did not provide her with relief. Triptans are also contraindicated as she has a previous aneurysm and a previous stroke.   7. The patient should return for reevaluation in 2 months  2.   Previous history of 5 mm aneurysm of left paraopthalmic ICA, underwent elective coiling on 1/25/21, subsequent left middle cerebral artery stroke secondary to occlusion of the left internal carotid artery status post mechanical thrombectomy. 1. Continue Aspirin 81 mg daily  2. CTA head neck w contrast showed only her previous stent which is patent and normal   3. The patient was released by Dr. Aviva Chu at her last visit  4.  Return for follow up visit in 3 months              Signed: Guanaco Cornejo CNP      *Please note that portions of this note were completed with a voice recognition program.  Although every effort was made to insure the accuracy of this automated transcription, some errors in transcription may have occurred, occasionally words and are mis-transcribed    Provider Attestation: The documentation recorded by the scribe accurately reflects the service I personally performed and the decisions made by myself. Portions of this note were transcribed by a scribe. I personally performed the history, physical exam, and medical decision-making and confirm the accuracy of the information in the transcribed note. Scribe Attestation:   By signing my name below, Chari Shoaib, attest that this documentation has been prepared under the direction and in the presence of Donald Reed CNP.

## 2022-03-14 RX ORDER — FLUTICASONE PROPIONATE 50 MCG
SPRAY, SUSPENSION (ML) NASAL
OUTPATIENT
Start: 2022-03-14

## 2022-03-21 ENCOUNTER — HOSPITAL ENCOUNTER (OUTPATIENT)
Dept: MRI IMAGING | Age: 42
Discharge: HOME | End: 2022-03-23
Payer: COMMERCIAL

## 2022-03-21 DIAGNOSIS — I67.9 CEREBRAL VASCULAR DISEASE: ICD-10-CM

## 2022-03-21 PROCEDURE — A9579 GAD-BASE MR CONTRAST NOS,1ML: HCPCS | Performed by: NURSE PRACTITIONER

## 2022-03-21 PROCEDURE — 70553 MRI BRAIN STEM W/O & W/DYE: CPT

## 2022-03-21 PROCEDURE — 6360000004 HC RX CONTRAST MEDICATION: Performed by: NURSE PRACTITIONER

## 2022-03-21 RX ADMIN — GADOTERIDOL 20 ML: 279.3 INJECTION, SOLUTION INTRAVENOUS at 17:35

## 2022-04-12 RX ORDER — AMITRIPTYLINE HYDROCHLORIDE 25 MG/1
TABLET, FILM COATED ORAL
Qty: 60 TABLET | Refills: 5 | OUTPATIENT
Start: 2022-04-12

## 2022-05-10 ENCOUNTER — HOSPITAL ENCOUNTER (OUTPATIENT)
Age: 42
Setting detail: SPECIMEN
Discharge: HOME | End: 2022-05-10

## 2022-05-10 ENCOUNTER — OFFICE VISIT (OUTPATIENT)
Dept: FAMILY MEDICINE CLINIC | Age: 42
End: 2022-05-10
Payer: COMMERCIAL

## 2022-05-10 VITALS
DIASTOLIC BLOOD PRESSURE: 82 MMHG | HEART RATE: 94 BPM | TEMPERATURE: 99.7 F | SYSTOLIC BLOOD PRESSURE: 122 MMHG | OXYGEN SATURATION: 99 %

## 2022-05-10 DIAGNOSIS — J02.9 SORE THROAT: ICD-10-CM

## 2022-05-10 DIAGNOSIS — R68.89 FLU-LIKE SYMPTOMS: ICD-10-CM

## 2022-05-10 DIAGNOSIS — H66.002 NON-RECURRENT ACUTE SUPPURATIVE OTITIS MEDIA OF LEFT EAR WITHOUT SPONTANEOUS RUPTURE OF TYMPANIC MEMBRANE: Primary | ICD-10-CM

## 2022-05-10 DIAGNOSIS — Z20.822 EXPOSURE TO COVID-19 VIRUS: ICD-10-CM

## 2022-05-10 LAB
INFLUENZA A ANTIBODY: NEGATIVE
INFLUENZA B ANTIBODY: NEGATIVE
S PYO AG THROAT QL: NORMAL

## 2022-05-10 PROCEDURE — 99213 OFFICE O/P EST LOW 20 MIN: CPT | Performed by: NURSE PRACTITIONER

## 2022-05-10 PROCEDURE — 87880 STREP A ASSAY W/OPTIC: CPT | Performed by: NURSE PRACTITIONER

## 2022-05-10 PROCEDURE — 87804 INFLUENZA ASSAY W/OPTIC: CPT | Performed by: NURSE PRACTITIONER

## 2022-05-10 RX ORDER — FLUTICASONE PROPIONATE 50 MCG
2 SPRAY, SUSPENSION (ML) NASAL DAILY
Qty: 16 G | Refills: 0 | Status: SHIPPED | OUTPATIENT
Start: 2022-05-10

## 2022-05-10 RX ORDER — AZITHROMYCIN 250 MG/1
250 TABLET, FILM COATED ORAL SEE ADMIN INSTRUCTIONS
Qty: 6 TABLET | Refills: 0 | Status: SHIPPED | OUTPATIENT
Start: 2022-05-10 | End: 2022-05-15

## 2022-05-10 ASSESSMENT — ENCOUNTER SYMPTOMS
VOMITING: 0
SORE THROAT: 1
ABDOMINAL PAIN: 0
NAUSEA: 1
COUGH: 1
SWOLLEN GLANDS: 0
CHANGE IN BOWEL HABIT: 0
VISUAL CHANGE: 0

## 2022-05-10 NOTE — LETTER
[facility]  [facility] [address] [facility] [address]  Phone: [phone]  Fax: [phone]    RUDOLPH Thapa CNP        May 10, 2022     Patient: Duyen Colby   YOB: 1980   Date of Visit: 5/10/2022       To Whom It May Concern: It is my medical opinion that Ely Ritter should remain out of work until [place] results are back in 1-3 days, pending negative result. .    If you have any questions or concerns, please don't hesitate to call.     Sincerely,        RUDOLPH Thapa CNP

## 2022-05-10 NOTE — PATIENT INSTRUCTIONS
Follow up Family Doctor in 2 weeks for ear recheck  Return worse, new symptoms develop, symptoms persist or have any questions or concerns  If over 6 months old, then may alternate Tylenol/Motrin every 3 hours as needed for pain or fever - take per package instructions  If under 6 months old, do not use Motrin (Ibuprofen), use Tylenol only, Take per package instructions  Cool mist humidifier bedside  Patient Education        Ear Infection (Otitis Media): Care Instructions  Overview     An ear infection may start with a cold and affect the middle ear (otitis media). It can hurt a lot. Most ear infections clear up on their own in a couple of days and do not need antibiotics. Also, antibiotics do not work against viruses, which may be the cause of your infection. Regular doses ofpain relievers are the best way to reduce your fever and help you feel better. Follow-up care is a key part of your treatment and safety. Be sure to make and go to all appointments, and call your doctor if you are having problems. It's also a good idea to know your test results and keep alist of the medicines you take. How can you care for yourself at home?  Take pain medicines exactly as directed. ? If the doctor gave you a prescription medicine for pain, take it as prescribed. ? If you are not taking a prescription pain medicine, take an over-the-counter medicine, such as acetaminophen (Tylenol), ibuprofen (Advil, Motrin), or naproxen (Aleve). Read and follow all instructions on the label. ? Do not take two or more pain medicines at the same time unless the doctor told you to. Many pain medicines have acetaminophen, which is Tylenol. Too much acetaminophen (Tylenol) can be harmful.  Plan to take a full dose of pain reliever before bedtime. Getting enough sleep will help you get better.  Try a warm, moist washcloth on the ear. It may help relieve pain.  If your doctor prescribed antibiotics, take them as directed.  Do not stop taking them just because you feel better. You need to take the full course of antibiotics. When should you call for help? Call your doctor now or seek immediate medical care if:     You have new or increasing ear pain.      You have new or increasing pus or blood draining from your ear.      You have a fever with a stiff neck or a severe headache. Watch closely for changes in your health, and be sure to contact your doctor if:     You have new or worse symptoms.      You are not getting better after taking an antibiotic for 2 days. Where can you learn more? Go to https://[employer]. org and sign in to your [employer] account. Enter P887 in the [employer] box to learn more about \"Ear Infection (Otitis Media): Care Instructions. \"     If you do not have an account, please click on the \"Sign Up Now\" link. Current as of: September 8, 2021               Content Version: 13.2  © 5875-8370 [employer], [employer]. Care instructions adapted under license by [facility] ([place] [place] [facility]). If you have questions about a medical condition or this instruction, always ask your healthcare professional. [place] [address] any warranty or liability for your use of this information.

## 2022-05-10 NOTE — PROGRESS NOTES
[address] [address]  Dept: [phone]  Dept Fax: [phone]    Flint Meigs is a 36 y.o. female who presents to the urgent care today for her medical conditions/complaints as notedbelow. Flint Meigs is c/o of Pharyngitis (onset last night), Generalized Body Aches, Nausea, Chills, and Nasal Congestion      HPI:     36 yr old female presents for st, body aches, chills, nausea and nasal congestion, sneezing. Temp 99.7  Exposed to coivd and flu at work. Last day of work was 6 days ago. Covid Vaccinated? 1 J&J, 1 booster dose. Pharyngitis  This is a new problem. The current episode started yesterday. The problem occurs constantly. The problem has been gradually worsening. Associated symptoms include anorexia, chills, congestion, coughing (occasional, minimal), fatigue, myalgias, nausea and a sore throat. Pertinent negatives include no abdominal pain, arthralgias, change in bowel habit, chest pain, diaphoresis, fever, headaches, joint swelling, neck pain, numbness, rash, swollen glands, urinary symptoms, vertigo, visual change, vomiting or weakness. Nothing aggravates the symptoms. She has tried NSAIDs (antihistmaine) for the symptoms. The treatment provided no relief. Generalized Body Aches  Associated symptoms include anorexia, chills, congestion, coughing (occasional, minimal), fatigue, myalgias, nausea and a sore throat. Pertinent negatives include no abdominal pain, arthralgias, change in bowel habit, chest pain, diaphoresis, fever, headaches, joint swelling, neck pain, numbness, rash, swollen glands, urinary symptoms, vertigo, visual change, vomiting or weakness.        Past Medical History:   Diagnosis Date    [facility]     [facility]cerebral vascular accident[facility] [address] [facility]     History of ischemic middle cerebral artery stroke     Migraines     Stroke [facility])         Current Outpatient Medications   Medication Sig Dispense Refill    azithromycin (ZITHROMAX) 250 MG tablet Take 1 tablet by mouth See Admin Instructions for 5 days 500mg on day 1 followed by 250mg on days 2 - 5 6 tablet 0    fluticasone (FLONASE) 50 MCG/ACT nasal spray 2 sprays by Nasal route daily 16 g 0    Ubrogepant 100 MG TABS Take one at onset of migraine. May repeat in 2 hours. No more than 2 in 24 hours and 4 in 1 week 10 tablet 5    amitriptyline (ELAVIL) 25 MG tablet Take 2 tablets by mouth nightly 60 tablet 5    ASPIRIN LOW DOSE 81 MG EC tablet TAKE 1 TABLET BY MOUTH EVERY DAY 90 tablet 3    Fremanezumab-vfrm (AJOVY) 225 MG/1.5ML SOAJ Inject 225 mg into the skin every 30 days 1 pen 5    amLODIPine (NORVASC) 5 MG tablet TAKE 1 TABLET BY MOUTH EVERY DAY 90 tablet 2     Current Facility-Administered Medications   Medication Dose Route Frequency Provider Last Rate Last Admin    levonorgestrel (MIRENA) IUD 52 mg 1 each  1 each IntraUTERine Once Hussein Buckner MD   1 each at 03/09/21 0806     Allergies   Allergen Reactions    Amoxicillin Swelling     lips    Penicillins Other (See Comments)     Pt. Unsure of reaction       Subjective:      Review of Systems   Constitutional: Positive for chills and fatigue. Negative for diaphoresis and fever. HENT: Positive for congestion and sore throat. Respiratory: Positive for cough (occasional, minimal). Cardiovascular: Negative for chest pain. Gastrointestinal: Positive for anorexia and nausea. Negative for abdominal pain, change in bowel habit and vomiting. Musculoskeletal: Positive for myalgias. Negative for arthralgias, joint swelling and neck pain. Skin: Negative for rash. Neurological: Negative for vertigo, weakness, numbness and headaches. All other systems reviewed and are negative. 14 systems reviewed and negative except as listed in HPI. Objective:     Physical Exam  Vitals and nursing note reviewed. Constitutional:       General: She is not in acute distress. Appearance: She is well-developed. She is ill-appearing. She is not toxic-appearing or diaphoretic. Comments: nontoxic   HENT:      Head: Normocephalic and atraumatic. Right Ear: Tympanic membrane, ear canal and external ear normal.      Left Ear: Ear canal and external ear normal.      Ears:      Comments: Left tm bulging and injected     Nose: Congestion and rhinorrhea present. Mouth/Throat:      Pharynx: Posterior oropharyngeal erythema present. No oropharyngeal exudate. Eyes:      General: No scleral icterus. Right eye: No discharge. Left eye: No discharge. Extraocular Movements: Extraocular movements intact. Conjunctiva/sclera: Conjunctivae normal.      Pupils: Pupils are equal, round, and reactive to light. Cardiovascular:      Rate and Rhythm: Normal rate and regular rhythm. Pulses: Normal pulses. Heart sounds: Normal heart sounds. Pulmonary:      Effort: Pulmonary effort is normal. No respiratory distress. Breath sounds: Normal breath sounds. No stridor. No wheezing, rhonchi or rales. Chest:      Chest wall: No tenderness. Abdominal:      General: Bowel sounds are normal. There is no distension. Palpations: Abdomen is soft. Tenderness: There is no abdominal tenderness. Musculoskeletal:         General: No tenderness or deformity. Normal range of motion. Cervical back: Normal range of motion and neck supple. Lymphadenopathy:      Cervical: No cervical adenopathy. Skin:     General: Skin is warm and dry. Capillary Refill: Capillary refill takes less than 2 seconds. Findings: No rash ( no rash to visible skin). Neurological:      General: No focal deficit present. Mental Status: She is alert and oriented to person, place, and time. Motor: No abnormal muscle tone.       Coordination: Coordination normal.   Psychiatric:         Mood and Affect: Mood normal.         Behavior: Behavior normal.       /82 (Site: Left Upper Arm, Position: Sitting, Cuff Size: Large Adult)   Pulse 94   Temp 99.7 °F (37.6 °C) (Infrared)   SpO2 99%     Assessment:       Diagnosis Orders   1. Non-recurrent acute suppurative otitis media of left ear without spontaneous rupture of tympanic membrane     2. Sore throat  POCT rapid strep A    azithromycin (ZITHROMAX) 250 MG tablet    COVID-19   3. Flu-like symptoms  POCT Influenza A/B    azithromycin (ZITHROMAX) 250 MG tablet    COVID-19   4. Exposure to COVID-19 virus         Plan:      Results for POC orders placed in visit on 05/10/22   POCT Influenza A/B   Result Value Ref Range    Influenza A Ab negative     Influenza B Ab negative    POCT rapid strep A   Result Value Ref Range    Strep A Ag None Detected None Detected         POCT Flu A neg  POCT Flu B neg  Poct Strep neg  Exposed to covid and flu at [employer]  tx left OM with:  zpak rx ( allergy pcn, amoxil and also used for strep throat tx - not enough sx to order throat culture)  flonase rx for congestion  Cont home antihistamine  Work note  Reviewed over-the-counter treatments for symptom management. Will send out COVID19 testing. Possible treatment alterations based on the results. Patient instructed to self-quarantine until testing results are back. Patient instructed not to return to work until results are back. Tylenol as needed for fever/pain. Encouraged adequate hydration and rest.  The patient indicates understanding of these issues and agrees with the plan. Educational materials provided on AVS.  Follow up if symptoms do not improve/worsen. Discussed symptoms that will warrant urgent ED evaluation/treatment. Return if symptoms worsen or fail to improve, for make appt with Family Doc in 2 weeks for ear check.     Orders Placed This Encounter   Medications    azithromycin (ZITHROMAX) 250 MG tablet     Sig: Take 1 tablet by mouth See Admin Instructions for 5 days 500mg on day 1 followed by 250mg on days 2 - 5     Dispense:  6 tablet     Refill:  0    fluticasone (FLONASE) 50 MCG/ACT nasal spray     Si sprays by Nasal route daily     Dispense:  16 g     Refill:  0         Patient given educational materials - see patient instructions. Discussed use, benefit, and side effects of prescribed medications. All patient questions answered. Pt voicedunderstanding.     Electronically signed by RUDOLPH Santana CNP on 5/10/2022 at 3:17 PM

## 2022-05-11 DIAGNOSIS — R68.89 FLU-LIKE SYMPTOMS: ICD-10-CM

## 2022-05-11 DIAGNOSIS — J02.9 SORE THROAT: ICD-10-CM

## 2022-05-11 LAB
SARS-COV-2: NORMAL
SARS-COV-2: NOT DETECTED
SOURCE: NORMAL

## 2022-06-02 RX ORDER — FLUTICASONE PROPIONATE 50 MCG
SPRAY, SUSPENSION (ML) NASAL
OUTPATIENT
Start: 2022-06-02

## 2022-09-14 RX ORDER — AMITRIPTYLINE HYDROCHLORIDE 25 MG/1
TABLET, FILM COATED ORAL
Qty: 180 TABLET | Refills: 0 | Status: SHIPPED | OUTPATIENT
Start: 2022-09-14

## 2022-09-14 NOTE — TELEPHONE ENCOUNTER
Pharmacy requesting refill of amitriptyline 25 mg.      Medication active on med list yes      Date of last Rx: 3/10/22 with 5 refills          verified by ROMINA BRIZUELA      Date of last appointment 3/10/22    Next Visit Date:  None, message left to schedule follow up appointment.

## 2022-09-21 DIAGNOSIS — I10 ESSENTIAL HYPERTENSION: ICD-10-CM

## 2022-09-21 RX ORDER — AMLODIPINE BESYLATE 5 MG/1
TABLET ORAL
Qty: 30 TABLET | Refills: 0 | Status: SHIPPED | OUTPATIENT
Start: 2022-09-21 | End: 2022-10-14

## 2022-09-21 NOTE — LETTER
[facility]  [address]  [address]    9/21/2022    [place] [address] [place] [address] Ariane        Dear Isis Chavira : In addition to helping you feel better when you are sick, we are interested in preventing illness and injury in the first place. In the spirit of maintaining your good health, your record indicates that you are due for an appointment. Please call [phone] to schedule. I look forward to seeing you soon.     Sincerely,         Gil Travis CNP/mb

## 2022-09-21 NOTE — TELEPHONE ENCOUNTER
Letter sent to patient for appointment   Rosy Pedraza is calling to request a refill on the following medication(s):    Medication Request:  Requested Prescriptions     Pending Prescriptions Disp Refills    amLODIPine (NORVASC) 5 MG tablet [Pharmacy Med Name: AMLODIPINE BESYLATE 5 MG TAB] 90 tablet 2     Sig: TAKE 1 TABLET BY MOUTH EVERY DAY       Last Visit Date (If Applicable):  6/6/3147    Next Visit Date:    Visit date not found

## 2022-10-14 DIAGNOSIS — I10 ESSENTIAL HYPERTENSION: ICD-10-CM

## 2022-10-14 RX ORDER — AMLODIPINE BESYLATE 5 MG/1
TABLET ORAL
Qty: 7 TABLET | Refills: 0 | Status: SHIPPED | OUTPATIENT
Start: 2022-10-14 | End: 2022-10-21 | Stop reason: SDUPTHER

## 2022-10-21 DIAGNOSIS — I10 ESSENTIAL HYPERTENSION: ICD-10-CM

## 2022-10-21 RX ORDER — AMLODIPINE BESYLATE 5 MG/1
5 TABLET ORAL DAILY
Qty: 30 TABLET | Refills: 0 | Status: SHIPPED | OUTPATIENT
Start: 2022-10-21 | End: 2022-11-17 | Stop reason: SDUPTHER

## 2022-10-24 ENCOUNTER — TELEPHONE (OUTPATIENT)
Dept: NEUROLOGY | Age: 42
End: 2022-10-24

## 2022-11-17 ENCOUNTER — OFFICE VISIT (OUTPATIENT)
Dept: FAMILY MEDICINE CLINIC | Age: 42
End: 2022-11-17
Payer: COMMERCIAL

## 2022-11-17 VITALS
DIASTOLIC BLOOD PRESSURE: 74 MMHG | TEMPERATURE: 97.1 F | OXYGEN SATURATION: 98 % | WEIGHT: 213 LBS | BODY MASS INDEX: 38.34 KG/M2 | SYSTOLIC BLOOD PRESSURE: 108 MMHG | HEART RATE: 60 BPM

## 2022-11-17 DIAGNOSIS — I10 ESSENTIAL HYPERTENSION: Primary | ICD-10-CM

## 2022-11-17 DIAGNOSIS — R73.09 ELEVATED GLUCOSE: ICD-10-CM

## 2022-11-17 DIAGNOSIS — E78.5 DYSLIPIDEMIA: ICD-10-CM

## 2022-11-17 PROCEDURE — 3078F DIAST BP <80 MM HG: CPT | Performed by: NURSE PRACTITIONER

## 2022-11-17 PROCEDURE — 3074F SYST BP LT 130 MM HG: CPT | Performed by: NURSE PRACTITIONER

## 2022-11-17 PROCEDURE — 99214 OFFICE O/P EST MOD 30 MIN: CPT | Performed by: NURSE PRACTITIONER

## 2022-11-17 RX ORDER — AMLODIPINE BESYLATE 5 MG/1
5 TABLET ORAL DAILY
Qty: 90 TABLET | Refills: 1 | Status: SHIPPED | OUTPATIENT
Start: 2022-11-17

## 2022-11-17 ASSESSMENT — ENCOUNTER SYMPTOMS
COLOR CHANGE: 0
SHORTNESS OF BREATH: 0
DIARRHEA: 0
NAUSEA: 0
COUGH: 0
CHEST TIGHTNESS: 0
VOMITING: 0
ALLERGIC/IMMUNOLOGIC NEGATIVE: 1
GASTROINTESTINAL NEGATIVE: 1
RESPIRATORY NEGATIVE: 1
EYES NEGATIVE: 1

## 2022-11-17 ASSESSMENT — PATIENT HEALTH QUESTIONNAIRE - PHQ9
SUM OF ALL RESPONSES TO PHQ QUESTIONS 1-9: 0
SUM OF ALL RESPONSES TO PHQ QUESTIONS 1-9: 0
SUM OF ALL RESPONSES TO PHQ9 QUESTIONS 1 & 2: 0
2. FEELING DOWN, DEPRESSED OR HOPELESS: 0
SUM OF ALL RESPONSES TO PHQ QUESTIONS 1-9: 0
1. LITTLE INTEREST OR PLEASURE IN DOING THINGS: 0
SUM OF ALL RESPONSES TO PHQ QUESTIONS 1-9: 0

## 2022-11-17 NOTE — PROGRESS NOTES
[facility]  [address] [facility]  Dept: [phone]      Itzel Saavedra is a 36 y.o. female who presents today for her medical conditions/complaintsas noted below. Itzel Saavedra is here today c/o Medication Refill    Past Medical History:   Diagnosis Date    Carotid aneurysm, left ([address]     CVA (cerebral vascular accident) ([facility] [address]     History of ischemic middle cerebral artery stroke     Migraines     Stroke [facility])       Past Surgical History:   Procedure Laterality Date    CARDIAC CATHETERIZATION  01/25/2021    IR ANGIOGRAM CAROTID CEREBRAL BILATERAL BRANDY SURPASS EVOLVE ANEURYSM EMBOLIZATION SYSTEM MRI CONDITIONAL 3T OK. SAFE IMMEDIATELY POST IMPLANT. CARDIAC CATHETERIZATION      IR ANGIOGRAM CAROTID CEREBRAL BILATERAL    CARDIAC CATHETERIZATION  01/25/2021    IR ANGIOGRAM CAROTID CEREBRAL BILATERAL    INTRAUTERINE DEVICE INSERTION  03/09/2021    Mirena IUD [address] [id number] Lot [id number] Exp May 2023    OPEN REPAIR PERIPHERAL ANEURYSM      brain aneurysm repair    OTHER SURGICAL HISTORY  05/18/2021    cerebral angiogram       Family History   Problem Relation Age of Onset    Kidney Disease Mother     Heart Disease Mother     Hypertension Mother     Migraines Mother     High Blood Pressure Mother     Heart Disease Father     Colon Cancer Father     Diabetes Father     Cancer Father         prostate    Heart Disease Brother        Social History     Tobacco Use    Smoking status: Never    Smokeless tobacco: Never   Substance Use Topics    Alcohol use: Yes     Comment: 3 glasses weekly      Current Outpatient Medications   Medication Sig Dispense Refill    amLODIPine (NORVASC) 5 MG tablet Take 1 tablet by mouth daily 30 tablet 0    amitriptyline (ELAVIL) 25 MG tablet TAKE 2 TABLETS BY MOUTH EVERY DAY AT NIGHT 180 tablet 0    fluticasone (FLONASE) 50 MCG/ACT nasal spray 2 sprays by Nasal route daily 16 g 0    Ubrogepant 100 MG TABS Take one at onset of migraine.  May repeat in 2 hours. No more than 2 in 24 hours and 4 in 1 week 10 tablet 5    ASPIRIN LOW DOSE 81 MG EC tablet TAKE 1 TABLET BY MOUTH EVERY DAY 90 tablet 3    Fremanezumab-vfrm (AJOVY) 225 MG/1.5ML SOAJ Inject 225 mg into the skin every 30 days 1 pen 5     Current Facility-Administered Medications   Medication Dose Route Frequency Provider Last Rate Last Admin    levonorgestrel (MIRENA) IUD 52 mg 1 each  1 each IntraUTERine Once Franklin Mancini MD   1 each at 03/09/21 4176     Allergies   Allergen Reactions    Amoxicillin Swelling     lips    Penicillins Other (See Comments)     Pt. Unsure of reaction         HPI:     HPI    Presents today c/o follow-up HTN  Taking amlodipine   Tolerating medication without side effects  Not monitoring blood pressure at home   Diet is nothing in particular   Exercises daily     H/o dyslipidemia     The 10-year ASCVD risk score (Kristian SERRANO, et al., 2019) is: 0.7%    Values used to calculate the score:      Age: 36 years      Sex: Female      Is Non- : Yes      Diabetic: No      Tobacco smoker: No      Systolic Blood Pressure: 587 mmHg      Is BP treated: Yes      HDL Cholesterol: 61 mg/dL      Total Cholesterol: 209 mg/dL     Follows with Neurology for h/o aneurysm repair & CVA & headaches  Declines any Neurological deficits aside from mild speech impairment     Health Maintenance:      Subjective:     Review of Systems   Constitutional: Negative. Negative for appetite change, chills, diaphoresis, fatigue, fever and unexpected weight change. HENT: Negative. Eyes: Negative. Respiratory: Negative. Negative for cough, chest tightness and shortness of breath. Cardiovascular: Negative. Negative for chest pain and leg swelling. Gastrointestinal: Negative. Negative for diarrhea, nausea and vomiting. Endocrine: Negative. Genitourinary: Negative. Negative for difficulty urinating. Musculoskeletal: Negative. Skin: Negative.   Negative for color change, pallor, rash and wound. Allergic/Immunologic: Negative. Neurological:  Positive for headaches. Negative for seizures, syncope, facial asymmetry, speech difficulty, weakness and numbness. Hematological: Negative. Psychiatric/Behavioral: Negative. Negative for dysphoric mood. The patient is not nervous/anxious. Objective:     Vitals:    11/17/22 1403   BP: 108/74   Pulse: 60   Temp: 97.1 °F (36.2 °C)   SpO2: 98%       Body mass index is 38.34 kg/m². Physical Exam  Constitutional:       General: She is not in acute distress. Appearance: Normal appearance. She is well-developed. She is obese. She is not ill-appearing, toxic-appearing or diaphoretic. HENT:      Head: Normocephalic and atraumatic. Right Ear: External ear normal.      Left Ear: External ear normal.      Nose: Nose normal.   Eyes:      General: No scleral icterus. Right eye: No discharge. Left eye: No discharge. Conjunctiva/sclera: Conjunctivae normal.      Pupils: Pupils are equal, round, and reactive to light. Neck:      Trachea: No tracheal deviation. Cardiovascular:      Rate and Rhythm: Normal rate and regular rhythm. Heart sounds: Normal heart sounds. No murmur heard. No friction rub. No gallop. Pulmonary:      Effort: Pulmonary effort is normal. No tachypnea, accessory muscle usage or respiratory distress. Breath sounds: Normal breath sounds. No stridor. No decreased breath sounds, wheezing, rhonchi or rales. Abdominal:      Palpations: Abdomen is soft. Musculoskeletal:         General: No tenderness or deformity. Normal range of motion. Cervical back: Normal range of motion and neck supple. Skin:     General: Skin is warm and dry. Coloration: Skin is not pale. Findings: No erythema or rash. Neurological:      Mental Status: She is alert and oriented to person, place, and time. GCS: GCS eye subscore is 4. GCS verbal subscore is 5.  GCS motor subscore is 6. Gait: Gait is intact. Gait normal.   Psychiatric:         Speech: Speech normal.         Behavior: Behavior normal.         Thought Content: Thought content normal.         Judgment: Judgment normal.         Assessment:         1. Essential hypertension    2. Dyslipidemia    3. Elevated glucose        Plan:     1. Essential hypertension    - amLODIPine (NORVASC) 5 MG tablet; Take 1 tablet by mouth daily  Dispense: 90 tablet; Refill: 1  - CBC with Auto Differential; Future  - Comprehensive Metabolic Panel; Future  - TSH with Reflex; Future    BP stable  Discussed reducing amlodipine to 2.5 mg if BP stays well controlled  Encouraged home monitoring  F/u in 6 months or sooner if problems arise    Weight reduction - can decrease blood pressure by 5-20 points per 10 kg of weight loss   DASH Diet - consume diet rich in fruits, vegetables, and low-fat dairy products with a reduced content of saturated and total fat  Dietary sodium restriction - Reduce dietary sodium intake to no more than 100meq/day (2.4 g sodium)  Physical activity - Engage in regular aerobic physical activity routinely (150min/wk or 30 minutes most days) such as brisk walking, swimming, running, cycling  Moderate consumption of alcohol - limit to more than 2 drinks/day in most men and 1 drink/day in women   Smoking cessation     Credit - UpToDate      2. Dyslipidemia    - Lipid, Fasting; Future    3. Elevated glucose    - Hemoglobin A1C; Future        Discussed use, benefit, and side effects of prescribed medications. All patient questions answered. Pt voiced understanding. Reviewed health maintenance. Instructed to continue current medications, diet and exercise. Patient agreedwith treatment plan. Follow up as directed.      Electronically signed by RUDOLPH Baca CNP on 11/17/2022

## 2022-12-15 RX ORDER — AMITRIPTYLINE HYDROCHLORIDE 25 MG/1
TABLET, FILM COATED ORAL
Qty: 180 TABLET | Refills: 0 | Status: SHIPPED | OUTPATIENT
Start: 2022-12-15

## 2022-12-15 NOTE — TELEPHONE ENCOUNTER
Pharmacy requesting refill of amitriptyline 25 mg.      Medication active on med list yes      Date of last Rx: 9/14/2022 with 0 refills          verified by ROMINA BRIZUELA      Date of last appointment 3/10/2022    Next Visit Date:  None, message left to schedule follow up appointment.

## 2023-02-13 DIAGNOSIS — I67.1 ANEURYSM OF INTERNAL CAROTID ARTERY: ICD-10-CM

## 2023-02-14 RX ORDER — ASPIRIN 81 MG/1
TABLET, COATED ORAL
Qty: 90 TABLET | Refills: 3 | Status: SHIPPED | OUTPATIENT
Start: 2023-02-14

## 2023-03-22 ENCOUNTER — HOSPITAL ENCOUNTER (EMERGENCY)
Age: 43
Discharge: HOME | End: 2023-03-22
Attending: EMERGENCY MEDICINE
Payer: COMMERCIAL

## 2023-03-22 ENCOUNTER — APPOINTMENT (OUTPATIENT)
Dept: CT IMAGING | Age: 43
End: 2023-03-22
Payer: COMMERCIAL

## 2023-03-22 VITALS
SYSTOLIC BLOOD PRESSURE: 133 MMHG | DIASTOLIC BLOOD PRESSURE: 90 MMHG | RESPIRATION RATE: 12 BRPM | TEMPERATURE: 98.8 F | BODY MASS INDEX: 33.13 KG/M2 | HEIGHT: 62 IN | WEIGHT: 180 LBS | OXYGEN SATURATION: 100 % | HEART RATE: 95 BPM

## 2023-03-22 DIAGNOSIS — R51.9 ACUTE NONINTRACTABLE HEADACHE, UNSPECIFIED HEADACHE TYPE: Primary | ICD-10-CM

## 2023-03-22 LAB
ABSOLUTE EOS #: 0.3 K/UL (ref 0–0.44)
ABSOLUTE IMMATURE GRANULOCYTE: 0.01 K/UL (ref 0–0.3)
ABSOLUTE LYMPH #: 3.05 K/UL (ref 1.1–3.7)
ABSOLUTE MONO #: 0.84 K/UL (ref 0.1–1.2)
ANION GAP SERPL CALCULATED.3IONS-SCNC: 8 MMOL/L (ref 9–17)
BASOPHILS # BLD: 0 % (ref 0–2)
BASOPHILS ABSOLUTE: 0.03 K/UL (ref 0–0.2)
BUN SERPL-MCNC: 6 MG/DL (ref 6–20)
BUN/CREAT BLD: 8 (ref 9–20)
CALCIUM SERPL-MCNC: 9.1 MG/DL (ref 8.6–10.4)
CHLORIDE SERPL-SCNC: 105 MMOL/L (ref 98–107)
CO2 SERPL-SCNC: 23 MMOL/L (ref 20–31)
CREAT SERPL-MCNC: 0.77 MG/DL (ref 0.5–0.9)
EOSINOPHILS RELATIVE PERCENT: 4 % (ref 1–4)
GFR SERPL CREATININE-BSD FRML MDRD: >60 ML/MIN/1.73M2
GLUCOSE SERPL-MCNC: 94 MG/DL (ref 70–99)
HCT VFR BLD AUTO: 41.1 % (ref 36.3–47.1)
HGB BLD-MCNC: 13.6 G/DL (ref 11.9–15.1)
IMMATURE GRANULOCYTES: 0 %
LYMPHOCYTES # BLD: 36 % (ref 24–43)
MCH RBC QN AUTO: 29.7 PG (ref 25.2–33.5)
MCHC RBC AUTO-ENTMCNC: 33.1 G/DL (ref 28.4–34.8)
MCV RBC AUTO: 89.7 FL (ref 82.6–102.9)
MONOCYTES # BLD: 10 % (ref 3–12)
NRBC AUTOMATED: 0 PER 100 WBC
PDW BLD-RTO: 12.8 % (ref 11.8–14.4)
PLATELET # BLD AUTO: 339 K/UL (ref 138–453)
PMV BLD AUTO: 9.8 FL (ref 8.1–13.5)
POTASSIUM SERPL-SCNC: 4.2 MMOL/L (ref 3.7–5.3)
RBC # BLD: 4.58 M/UL (ref 3.95–5.11)
SEG NEUTROPHILS: 50 % (ref 36–65)
SEGMENTED NEUTROPHILS ABSOLUTE COUNT: 4.35 K/UL (ref 1.5–8.1)
SODIUM SERPL-SCNC: 136 MMOL/L (ref 135–144)
WBC # BLD AUTO: 8.6 K/UL (ref 3.5–11.3)

## 2023-03-22 PROCEDURE — 6360000002 HC RX W HCPCS: Performed by: EMERGENCY MEDICINE

## 2023-03-22 PROCEDURE — 70450 CT HEAD/BRAIN W/O DYE: CPT

## 2023-03-22 PROCEDURE — 96375 TX/PRO/DX INJ NEW DRUG ADDON: CPT

## 2023-03-22 PROCEDURE — 99284 EMERGENCY DEPT VISIT MOD MDM: CPT

## 2023-03-22 PROCEDURE — 96374 THER/PROPH/DIAG INJ IV PUSH: CPT

## 2023-03-22 PROCEDURE — 80048 BASIC METABOLIC PNL TOTAL CA: CPT

## 2023-03-22 PROCEDURE — 85025 COMPLETE CBC W/AUTO DIFF WBC: CPT

## 2023-03-22 RX ORDER — DIPHENHYDRAMINE HYDROCHLORIDE 50 MG/ML
50 INJECTION INTRAMUSCULAR; INTRAVENOUS ONCE
Status: COMPLETED | OUTPATIENT
Start: 2023-03-22 | End: 2023-03-22

## 2023-03-22 RX ORDER — KETOROLAC TROMETHAMINE 30 MG/ML
30 INJECTION, SOLUTION INTRAMUSCULAR; INTRAVENOUS ONCE
Status: COMPLETED | OUTPATIENT
Start: 2023-03-22 | End: 2023-03-22

## 2023-03-22 RX ORDER — METOCLOPRAMIDE HYDROCHLORIDE 5 MG/ML
10 INJECTION INTRAMUSCULAR; INTRAVENOUS ONCE
Status: COMPLETED | OUTPATIENT
Start: 2023-03-22 | End: 2023-03-22

## 2023-03-22 RX ADMIN — KETOROLAC TROMETHAMINE 30 MG: 30 INJECTION, SOLUTION INTRAMUSCULAR at 11:50

## 2023-03-22 RX ADMIN — DIPHENHYDRAMINE HYDROCHLORIDE 50 MG: 50 INJECTION, SOLUTION INTRAMUSCULAR; INTRAVENOUS at 10:31

## 2023-03-22 RX ADMIN — METOCLOPRAMIDE 10 MG: 5 INJECTION, SOLUTION INTRAMUSCULAR; INTRAVENOUS at 10:31

## 2023-03-22 ASSESSMENT — ENCOUNTER SYMPTOMS
EYE REDNESS: 0
VOMITING: 0
COLOR CHANGE: 0
SORE THROAT: 0
DIARRHEA: 0
PHOTOPHOBIA: 1
COUGH: 0
NAUSEA: 1
EYE DISCHARGE: 0
RHINORRHEA: 0
SHORTNESS OF BREATH: 0

## 2023-03-22 NOTE — ED PROVIDER NOTES
reevaluation. 1)  Number and Complexity of Problems  Problem List This Visit: Headache    Differential Diagnosis: Migraine, tension headache, aneurysm rupture    Diagnoses Considered but Do Not Suspect: Angitis    Pertinent Comorbid Conditions: Prior aneurysm. 2)  Data Reviewed    Imaging that is independently reviewed and interpreted by me as: CT scan negative for acute pathology    See more data below for the lab and radiology tests and orders. 3)  Treatment and Disposition  Patient's headache is improving, CT scan negative, plan will be a dose of Toradol, discharge, outpatient follow-up with her PCP and return if symptoms worsen or change. Shared Decision Making: The patient was involved in his/her plan of care through shared decision making. The testing that was ordered was discussed with the patient. Any medications that may have been ordered were discussed with the patient    Code Status Discussion:      \"ED Course\" Notes From Epic Narrator:         CRITICAL CARE:       PROCEDURES:  Procedures      DATA FOR LAB AND RADIOLOGY TESTS ORDERED BELOW ARE REVIEWED BY THE ED CLINICIAN:    RADIOLOGY: All x-rays, CT, MRI, and formal ultrasound images (except ED bedside ultrasound) are read by the radiologist, see reports below, unless otherwise noted in MDM or here. Reports below are reviewed by myself. CT HEAD WO CONTRAST   Final Result   No acute intracranial abnormality. LABS: Lab orders shown below, the results are reviewed by myself, and all abnormals are listed below.   Labs Reviewed   BASIC METABOLIC PANEL - Abnormal; Notable for the following components:       Result Value    Bun/Cre Ratio 8 (*)     Anion Gap 8 (*)     All other components within normal limits   CBC WITH AUTO DIFFERENTIAL       Vitals Reviewed:    Vitals:    03/22/23 0942   BP: (!) 133/90   Pulse: 95   Resp: 12   Temp: 98.8 °F (37.1 °C)   TempSrc: Oral   SpO2: 100%   Weight: 180 lb (81.6 kg)   Height: 5' 2\" (1.575 m)

## 2023-03-22 NOTE — Clinical Note
Tonya Rondon was seen and treated in our emergency department on 3/22/2023. She may return to work on 03/23/2023. If you have any questions or concerns, please don't hesitate to call.       Josue Roman MD

## 2023-05-04 ENCOUNTER — OFFICE VISIT (OUTPATIENT)
Dept: NEUROLOGY | Age: 43
End: 2023-05-04
Payer: COMMERCIAL

## 2023-05-04 ENCOUNTER — TELEPHONE (OUTPATIENT)
Dept: NEUROLOGY | Age: 43
End: 2023-05-04

## 2023-05-04 VITALS
HEIGHT: 63 IN | DIASTOLIC BLOOD PRESSURE: 72 MMHG | BODY MASS INDEX: 37.21 KG/M2 | WEIGHT: 210 LBS | SYSTOLIC BLOOD PRESSURE: 114 MMHG | HEART RATE: 77 BPM

## 2023-05-04 DIAGNOSIS — Z98.890 S/P CEREBRAL ANEURYSM REPAIR: ICD-10-CM

## 2023-05-04 DIAGNOSIS — Z86.79 S/P CEREBRAL ANEURYSM REPAIR: ICD-10-CM

## 2023-05-04 DIAGNOSIS — G43.019 INTRACTABLE MIGRAINE WITHOUT AURA AND WITHOUT STATUS MIGRAINOSUS: ICD-10-CM

## 2023-05-04 DIAGNOSIS — Z86.73 HISTORY OF ISCHEMIC MIDDLE CEREBRAL ARTERY STROKE: Primary | ICD-10-CM

## 2023-05-04 DIAGNOSIS — Z86.79 HISTORY OF ANEURYSM INVOLVING NERVOUS SYSTEM: ICD-10-CM

## 2023-05-04 PROCEDURE — 99214 OFFICE O/P EST MOD 30 MIN: CPT | Performed by: NURSE PRACTITIONER

## 2023-05-04 RX ORDER — ATOGEPANT 60 MG/1
60 TABLET ORAL DAILY
Qty: 30 TABLET | Refills: 5 | Status: SHIPPED | OUTPATIENT
Start: 2023-05-04

## 2023-05-04 RX ORDER — AMITRIPTYLINE HYDROCHLORIDE 25 MG/1
TABLET, FILM COATED ORAL
Qty: 180 TABLET | Refills: 3 | Status: SHIPPED | OUTPATIENT
Start: 2023-05-04

## 2023-05-04 NOTE — TELEPHONE ENCOUNTER
[facility] contacted and asked to call pt to schedule appt. Office stated they would call the pt to schedule.

## 2023-05-16 DIAGNOSIS — I10 ESSENTIAL HYPERTENSION: ICD-10-CM

## 2023-05-16 RX ORDER — AMLODIPINE BESYLATE 5 MG/1
TABLET ORAL
Qty: 90 TABLET | Refills: 0 | Status: SHIPPED | OUTPATIENT
Start: 2023-05-16

## 2023-05-25 ENCOUNTER — TELEPHONE (OUTPATIENT)
Dept: NEUROLOGY | Age: 43
End: 2023-05-25

## 2023-06-15 ENCOUNTER — APPOINTMENT (OUTPATIENT)
Dept: CT IMAGING | Age: 43
End: 2023-06-15
Payer: COMMERCIAL

## 2023-06-15 ENCOUNTER — APPOINTMENT (OUTPATIENT)
Dept: GENERAL RADIOLOGY | Age: 43
End: 2023-06-15
Payer: COMMERCIAL

## 2023-06-15 ENCOUNTER — HOSPITAL ENCOUNTER (EMERGENCY)
Age: 43
Discharge: HOME | End: 2023-06-15
Attending: EMERGENCY MEDICINE
Payer: COMMERCIAL

## 2023-06-15 VITALS
WEIGHT: 200 LBS | DIASTOLIC BLOOD PRESSURE: 64 MMHG | BODY MASS INDEX: 36.8 KG/M2 | OXYGEN SATURATION: 100 % | HEIGHT: 62 IN | RESPIRATION RATE: 22 BRPM | SYSTOLIC BLOOD PRESSURE: 118 MMHG | TEMPERATURE: 98.1 F | HEART RATE: 64 BPM

## 2023-06-15 DIAGNOSIS — R07.9 CHEST PAIN, UNSPECIFIED TYPE: Primary | ICD-10-CM

## 2023-06-15 DIAGNOSIS — R91.8 PULMONARY NODULES: ICD-10-CM

## 2023-06-15 DIAGNOSIS — R59.1 LYMPHADENOPATHY: ICD-10-CM

## 2023-06-15 DIAGNOSIS — R93.89 ABNORMAL CT OF THE CHEST: ICD-10-CM

## 2023-06-15 LAB
ANION GAP SERPL CALCULATED.3IONS-SCNC: 12 MMOL/L (ref 9–17)
BASOPHILS # BLD: 0.04 K/UL (ref 0–0.2)
BASOPHILS NFR BLD: 1 % (ref 0–2)
BUN SERPL-MCNC: 8 MG/DL (ref 6–20)
BUN/CREAT SERPL: 11 (ref 9–20)
CALCIUM SERPL-MCNC: 9.4 MG/DL (ref 8.6–10.4)
CHLORIDE SERPL-SCNC: 102 MMOL/L (ref 98–107)
CO2 SERPL-SCNC: 23 MMOL/L (ref 20–31)
CREAT SERPL-MCNC: 0.74 MG/DL (ref 0.5–0.9)
D DIMER PPP FEU-MCNC: 1.75 UG/ML FEU (ref 0–0.59)
EOSINOPHIL # BLD: 0.33 K/UL (ref 0–0.44)
EOSINOPHILS RELATIVE PERCENT: 4 % (ref 1–4)
ERYTHROCYTE [DISTWIDTH] IN BLOOD BY AUTOMATED COUNT: 12.8 % (ref 11.8–14.4)
GFR SERPL CREATININE-BSD FRML MDRD: >60 ML/MIN/1.73M2
GLUCOSE SERPL-MCNC: 83 MG/DL (ref 70–99)
HCT VFR BLD AUTO: 44.2 % (ref 36.3–47.1)
HGB BLD-MCNC: 14.5 G/DL (ref 11.9–15.1)
IMM GRANULOCYTES # BLD AUTO: 0.02 K/UL (ref 0–0.3)
IMM GRANULOCYTES NFR BLD: 0 %
LYMPHOCYTES # BLD: 35 % (ref 24–43)
LYMPHOCYTES NFR BLD: 2.74 K/UL (ref 1.1–3.7)
MCH RBC QN AUTO: 29.9 PG (ref 25.2–33.5)
MCHC RBC AUTO-ENTMCNC: 32.8 G/DL (ref 28.4–34.8)
MCV RBC AUTO: 91.1 FL (ref 82.6–102.9)
MONOCYTES NFR BLD: 0.72 K/UL (ref 0.1–1.2)
MONOCYTES NFR BLD: 9 % (ref 3–12)
MYOGLOBIN SERPL-MCNC: 21 NG/ML (ref 25–58)
NEUTROPHILS NFR BLD: 51 % (ref 36–65)
NEUTS SEG NFR BLD: 4.04 K/UL (ref 1.5–8.1)
NRBC AUTOMATED: 0 PER 100 WBC
PLATELET # BLD AUTO: 330 K/UL (ref 138–453)
PMV BLD AUTO: 9.8 FL (ref 8.1–13.5)
POTASSIUM SERPL-SCNC: 3.9 MMOL/L (ref 3.7–5.3)
RBC # BLD AUTO: 4.85 M/UL (ref 3.95–5.11)
SODIUM SERPL-SCNC: 137 MMOL/L (ref 135–144)
TROPONIN I SERPL HS-MCNC: <6 NG/L (ref 0–14)
WBC OTHER # BLD: 7.9 K/UL (ref 3.5–11.3)

## 2023-06-15 PROCEDURE — 83874 ASSAY OF MYOGLOBIN: CPT

## 2023-06-15 PROCEDURE — 2580000003 HC RX 258: Performed by: NURSE PRACTITIONER

## 2023-06-15 PROCEDURE — 6360000004 HC RX CONTRAST MEDICATION: Performed by: NURSE PRACTITIONER

## 2023-06-15 PROCEDURE — 80048 BASIC METABOLIC PNL TOTAL CA: CPT

## 2023-06-15 PROCEDURE — 84484 ASSAY OF TROPONIN QUANT: CPT

## 2023-06-15 PROCEDURE — 99285 EMERGENCY DEPT VISIT HI MDM: CPT

## 2023-06-15 PROCEDURE — 71260 CT THORAX DX C+: CPT

## 2023-06-15 PROCEDURE — 85379 FIBRIN DEGRADATION QUANT: CPT

## 2023-06-15 PROCEDURE — 93005 ELECTROCARDIOGRAM TRACING: CPT | Performed by: EMERGENCY MEDICINE

## 2023-06-15 PROCEDURE — 71045 X-RAY EXAM CHEST 1 VIEW: CPT

## 2023-06-15 PROCEDURE — 85027 COMPLETE CBC AUTOMATED: CPT

## 2023-06-15 RX ORDER — DOXYCYCLINE HYCLATE 100 MG
100 TABLET ORAL 2 TIMES DAILY
Qty: 20 TABLET | Refills: 0 | Status: SHIPPED | OUTPATIENT
Start: 2023-06-15 | End: 2023-06-25

## 2023-06-15 RX ORDER — 0.9 % SODIUM CHLORIDE 0.9 %
80 INTRAVENOUS SOLUTION INTRAVENOUS ONCE
Status: COMPLETED | OUTPATIENT
Start: 2023-06-15 | End: 2023-06-15

## 2023-06-15 RX ORDER — SODIUM CHLORIDE 0.9 % (FLUSH) 0.9 %
10 SYRINGE (ML) INJECTION PRN
Status: DISCONTINUED | OUTPATIENT
Start: 2023-06-15 | End: 2023-06-15 | Stop reason: HOSPADM

## 2023-06-15 RX ADMIN — SODIUM CHLORIDE, PRESERVATIVE FREE 10 ML: 5 INJECTION INTRAVENOUS at 18:26

## 2023-06-15 RX ADMIN — SODIUM CHLORIDE 80 ML: 9 INJECTION, SOLUTION INTRAVENOUS at 18:26

## 2023-06-15 RX ADMIN — IOPAMIDOL 75 ML: 755 INJECTION, SOLUTION INTRAVENOUS at 18:26

## 2023-06-15 ASSESSMENT — ENCOUNTER SYMPTOMS
COLOR CHANGE: 0
SHORTNESS OF BREATH: 1
ABDOMINAL PAIN: 0
NAUSEA: 0
SORE THROAT: 0
COUGH: 0
BACK PAIN: 0
VOMITING: 0
DIARRHEA: 0

## 2023-06-15 NOTE — ED NOTES
Pt presents to ed c/o chest pain and shortness of breath that started Tuesday. She has a h/o CVA two years ago. She states that pain is the same all the time. Vital signs stable.        Nilsa Shah RN  06/15/23 5223

## 2023-06-15 NOTE — ED PROVIDER NOTES
Team [address] ED  eMERGENCY dEPARTMENT eNCOUnter      Pt Name: Maura Wagner  MRN: [id number]  [place] 1980  Date of evaluation: 6/15/2023  Provider: RUDOLPH Patel - CNP    CHIEF COMPLAINT       Chief Complaint   Patient presents with    Chest Pain     Pt states starting tues, chest pain on her Lt side. Shortness of Breath    Fatigue     Pt states fatigue started today. HISTORY OF PRESENT ILLNESS  (Location/Symptom, Timing/Onset, Context/Setting, Quality, Duration, Modifying Factors, Severity.)   Maura Wagner is a 39 y.o. female who presents to the emergency department. C/o left chest discomfort, SOB. Onset was 6/13/23. The pain is worse with inspiration. Denies fever, chills, cough, weakness, dizziness. Denies recent travel or surgery. Rates her pain 6/10 at this time. She has an IUD in place. Nursing Notes were reviewed. ALLERGIES     Amoxicillin and Penicillins    CURRENT MEDICATIONS       Previous Medications    AMITRIPTYLINE (ELAVIL) 25 MG TABLET    TAKE 2 TABLETS BY MOUTH EVERY NIGHT    AMLODIPINE (NORVASC) 5 MG TABLET    TAKE 1 TABLET BY MOUTH EVERY DAY    ASPIRIN LOW DOSE 81 MG EC TABLET    TAKE 1 TABLET BY MOUTH EVERY DAY    ATOGEPANT (QULIPTA) 60 MG TABS    Take 60 mg by mouth daily    UBROGEPANT 100 MG TABS    Take one at onset of migraine. May repeat in 2 hours. No more than 2 in 24 hours and 4 in 1 week       PAST MEDICAL HISTORY         Diagnosis Date    Carotid aneurysm, left ([facility])     CVA (cerebral vascular accident) ([facility] [address]     History of ischemic middle cerebral artery stroke     Migraines     Stroke [facility])        SURGICAL HISTORY           Procedure Laterality Date    CARDIAC CATHETERIZATION  01/25/2021    IR ANGIOGRAM CAROTID CEREBRAL BILATERAL BRANDY SURPASS EVOLVE ANEURYSM EMBOLIZATION SYSTEM MRI CONDITIONAL 3T OK. SAFE IMMEDIATELY POST IMPLANT.     CARDIAC CATHETERIZATION      IR ANGIOGRAM CAROTID CEREBRAL BILATERAL    CARDIAC CATHETERIZATION

## 2023-06-16 PROBLEM — R59.0 HILAR LYMPHADENOPATHY: Status: ACTIVE | Noted: 2023-06-16

## 2023-06-16 PROBLEM — R91.8 MULTIPLE PULMONARY NODULES: Status: ACTIVE | Noted: 2023-06-16

## 2023-06-16 PROBLEM — R93.89 ABNORMAL CT OF THE CHEST: Status: ACTIVE | Noted: 2023-06-16

## 2023-06-16 LAB
EKG ATRIAL RATE: 61 BPM
EKG P AXIS: 39 DEGREES
EKG P-R INTERVAL: 144 MS
EKG Q-T INTERVAL: 418 MS
EKG QRS DURATION: 86 MS
EKG QTC CALCULATION (BAZETT): 420 MS
EKG R AXIS: 25 DEGREES
EKG T AXIS: 42 DEGREES
EKG VENTRICULAR RATE: 61 BPM

## 2023-06-16 NOTE — ED PROVIDER NOTES
[address] [facility]  eMERGENCY dEPARTMENT eNCOUnter     Pt Name: Dex Brennan  MRN: [id number]  [place] 1980  Date of evaluation: 6/15/23    Dex Patient is a 39 y.o. female with CC: Chest Pain (Pt states starting tues, chest pain on her Lt side. ), Shortness of Breath, and Fatigue (Pt states fatigue started today. )      MDM:        Vitals:    06/15/23 1602 06/15/23 1603 06/15/23 1815   BP:  (!) 134/93 118/64   Pulse:  69 64   Resp:  18 22   Temp: 98.1 °F (36.7 °C)     TempSrc: Oral     SpO2:  100%    Weight: 200 lb (90.7 kg)     Height: 5' 2\" (1.575 m)              This visit was performed by both a physician and an APC. I performed all aspects of the MDM as documented.     Richard Ambrosio DO  Attending Emergency Physician                  Prashanth Whaley DO  06/15/23 5407

## 2023-06-23 ENCOUNTER — OFFICE VISIT (OUTPATIENT)
Dept: PULMONOLOGY | Age: 43
End: 2023-06-23

## 2023-06-23 VITALS
DIASTOLIC BLOOD PRESSURE: 91 MMHG | BODY MASS INDEX: 36.66 KG/M2 | HEIGHT: 62 IN | WEIGHT: 199.2 LBS | OXYGEN SATURATION: 96 % | SYSTOLIC BLOOD PRESSURE: 126 MMHG | RESPIRATION RATE: 14 BRPM | HEART RATE: 83 BPM

## 2023-06-23 DIAGNOSIS — R91.8 PULMONARY NODULES/LESIONS, MULTIPLE: ICD-10-CM

## 2023-06-23 DIAGNOSIS — R59.0 MEDIASTINAL ADENOPATHY: ICD-10-CM

## 2023-06-23 DIAGNOSIS — R59.0 MEDIASTINAL ADENOPATHY: Primary | ICD-10-CM

## 2023-06-23 PROBLEM — I63.9 STROKE (HCC): Status: ACTIVE | Noted: 2023-06-23

## 2023-06-23 NOTE — PATIENT INSTRUCTIONS
Provided BRONCH INSTRUCTIONS   F/U IN [facility] ER OFFICE.   LS   *correction - Per Dr. Burton Done - he will call patient with results. Cancel appt.    LS

## 2023-06-24 NOTE — PROGRESS NOTES
PULMONARY MEDICINE OUTPATIENT NOTE                                                                       PATIENT:  Ender Geronimo  YOB: 1980  MRN: [id number]     REFERRED BY: RUDOLPH Valladares - ARI   CHIEF COMPLIANT: Breathing Problem (NP- nodule. Pt had CT with contrast done )       Benoit Ohara is a 39y.o. year old female here for establishing care    INITIAL VISIT:6/23/2023  LAST VISIT:    PULMONARY PROBLEM LIST:  1. Mediastinal adenopathy    2. Pulmonary nodules/lesions, multiple       HISTORY OF PRESENT ILLNESS: Patient is a non-smoker, however reports daily marijuana use. Reports history of cerebral aneurysm for which she underwent coiling in January 2021. The procedure complicated by development of left MCA stroke and patient required mechanical thrombectomy due to left ICA occlusion. She reports that she was recently seen in the ED with chest pain and difficulty breathing. Her CT chest (6/15/2023) was reported to show bilateral mediastinal and hilar adenopathy. Along with multiple bilateral pulmonary nodules, the largest nodule is 9 mm in the right lower lobe along with mild interstitial thickening/atelectasis at the bilateral lung bases. She denies any occupational exposure. Reports cough and sputum. Pulmonary function test done in the clinic shows normal spirometry lung volumes, diffusion capacity is increased.     CURRENT SYMPTOMS:  See HPI    PULMONARY COMORBIDITIES/RISK FACTORS:     Yes No   NASO-BRONCHIAL/ENVIRONMENTAL ALLERGIES [] [x]   ASTHMA [] [x]   CHRONIC SINUSITIS/POSTNASAL DRIP [] [x]   GERD/ACID REFLUX [] [x]   ASPIRIN USE [x] []   CORONARY ARTERY DISEASE  []  [x]   CONGESTIVE CARDIAC FAILURE [] [x]   ATRIAL FIBRILLATION [] [x]   AMIODARONE USE [] [x]   PULMONARY HYPERTENSION [] [x]   VENOUS THROMBOEMBOLISM [] [x]   CHRONIC LIVER DISEASE/CIRRHOSIS [] [x]   CONNECTIVE TISSUE DISORDER [] [x]   DIAPHRAGMATIC DISORDER [] [x]   CHEST WALL RESTRICTION []

## 2023-06-24 NOTE — PROGRESS NOTES
PULMONARY FUNCTION TEST:  Spirometry negative for any significant ventilatory impairment. Lung volumes are within normal limit. Diffusion capacity is increased. Correlate clinically.

## 2023-06-29 ENCOUNTER — HOSPITAL ENCOUNTER (OUTPATIENT)
Age: 43
Setting detail: OUTPATIENT SURGERY
Discharge: HOME | End: 2023-06-29
Attending: INTERNAL MEDICINE | Admitting: INTERNAL MEDICINE
Payer: COMMERCIAL

## 2023-06-29 ENCOUNTER — ANESTHESIA (OUTPATIENT)
Dept: OPERATING ROOM | Age: 43
End: 2023-06-29
Payer: COMMERCIAL

## 2023-06-29 ENCOUNTER — ANESTHESIA EVENT (OUTPATIENT)
Dept: OPERATING ROOM | Age: 43
End: 2023-06-29
Payer: COMMERCIAL

## 2023-06-29 VITALS
DIASTOLIC BLOOD PRESSURE: 78 MMHG | RESPIRATION RATE: 19 BRPM | HEART RATE: 51 BPM | OXYGEN SATURATION: 100 % | WEIGHT: 200 LBS | TEMPERATURE: 97 F | HEIGHT: 63 IN | SYSTOLIC BLOOD PRESSURE: 159 MMHG | BODY MASS INDEX: 35.44 KG/M2

## 2023-06-29 DIAGNOSIS — R59.0 MEDIASTINAL ADENOPATHY: ICD-10-CM

## 2023-06-29 DIAGNOSIS — R91.1 PULMONARY NODULE: ICD-10-CM

## 2023-06-29 DIAGNOSIS — R91.8 PULMONARY NODULES/LESIONS, MULTIPLE: ICD-10-CM

## 2023-06-29 LAB
BUN BLD-MCNC: 7 MG/DL (ref 8–26)
EGFR, POC: >60 ML/MIN/1.73M2
GLUCOSE BLD-MCNC: 89 MG/DL (ref 74–100)
HCG, PREGNANCY URINE (POC): NEGATIVE
POC CREATININE: 0.65 MG/DL (ref 0.51–1.19)

## 2023-06-29 PROCEDURE — 82565 ASSAY OF CREATININE: CPT

## 2023-06-29 PROCEDURE — 7100000010 HC PHASE II RECOVERY - FIRST 15 MIN: Performed by: INTERNAL MEDICINE

## 2023-06-29 PROCEDURE — 3609011300 HC BRONCHOSCOPY BRONCHIAL/ENDOBRNCL BX 1+ SITES: Performed by: INTERNAL MEDICINE

## 2023-06-29 PROCEDURE — 87205 SMEAR GRAM STAIN: CPT

## 2023-06-29 PROCEDURE — 88185 FLOWCYTOMETRY/TC ADD-ON: CPT

## 2023-06-29 PROCEDURE — 7100000000 HC PACU RECOVERY - FIRST 15 MIN: Performed by: INTERNAL MEDICINE

## 2023-06-29 PROCEDURE — 88173 CYTOPATH EVAL FNA REPORT: CPT

## 2023-06-29 PROCEDURE — 88184 FLOWCYTOMETRY/ TC 1 MARKER: CPT

## 2023-06-29 PROCEDURE — 88305 TISSUE EXAM BY PATHOLOGIST: CPT

## 2023-06-29 PROCEDURE — 87116 MYCOBACTERIA CULTURE: CPT

## 2023-06-29 PROCEDURE — 88312 SPECIAL STAINS GROUP 1: CPT

## 2023-06-29 PROCEDURE — 82947 ASSAY GLUCOSE BLOOD QUANT: CPT

## 2023-06-29 PROCEDURE — 3609010800 HC BRONCHOSCOPY ALVEOLAR LAVAGE: Performed by: INTERNAL MEDICINE

## 2023-06-29 PROCEDURE — 87206 SMEAR FLUORESCENT/ACID STAI: CPT

## 2023-06-29 PROCEDURE — 6360000002 HC RX W HCPCS: Performed by: NURSE ANESTHETIST, CERTIFIED REGISTERED

## 2023-06-29 PROCEDURE — 87799 DETECT AGENT NOS DNA QUANT: CPT

## 2023-06-29 PROCEDURE — 2500000003 HC RX 250 WO HCPCS: Performed by: NURSE ANESTHETIST, CERTIFIED REGISTERED

## 2023-06-29 PROCEDURE — 2709999900 HC NON-CHARGEABLE SUPPLY: Performed by: INTERNAL MEDICINE

## 2023-06-29 PROCEDURE — 87102 FUNGUS ISOLATION CULTURE: CPT

## 2023-06-29 PROCEDURE — 81025 URINE PREGNANCY TEST: CPT

## 2023-06-29 PROCEDURE — 31652 BRONCH EBUS SAMPLNG 1/2 NODE: CPT | Performed by: INTERNAL MEDICINE

## 2023-06-29 PROCEDURE — 3700000000 HC ANESTHESIA ATTENDED CARE: Performed by: INTERNAL MEDICINE

## 2023-06-29 PROCEDURE — 87015 SPECIMEN INFECT AGNT CONCNTJ: CPT

## 2023-06-29 PROCEDURE — 3700000001 HC ADD 15 MINUTES (ANESTHESIA): Performed by: INTERNAL MEDICINE

## 2023-06-29 PROCEDURE — 2580000003 HC RX 258: Performed by: NURSE ANESTHETIST, CERTIFIED REGISTERED

## 2023-06-29 PROCEDURE — 88112 CYTOPATH CELL ENHANCE TECH: CPT

## 2023-06-29 PROCEDURE — 7100000011 HC PHASE II RECOVERY - ADDTL 15 MIN: Performed by: INTERNAL MEDICINE

## 2023-06-29 PROCEDURE — 7100000001 HC PACU RECOVERY - ADDTL 15 MIN: Performed by: INTERNAL MEDICINE

## 2023-06-29 PROCEDURE — 88177 CYTP FNA EVAL EA ADDL: CPT

## 2023-06-29 PROCEDURE — 31624 DX BRONCHOSCOPE/LAVAGE: CPT | Performed by: INTERNAL MEDICINE

## 2023-06-29 PROCEDURE — 87070 CULTURE OTHR SPECIMN AEROBIC: CPT

## 2023-06-29 PROCEDURE — 3603165200 HC BRNCHSC EBUS GUIDED SAMPL 1/2 NODE STATION/STRUX: Performed by: INTERNAL MEDICINE

## 2023-06-29 PROCEDURE — 84520 ASSAY OF UREA NITROGEN: CPT

## 2023-06-29 PROCEDURE — 6370000000 HC RX 637 (ALT 250 FOR IP): Performed by: NURSE ANESTHETIST, CERTIFIED REGISTERED

## 2023-06-29 PROCEDURE — 31625 BRONCHOSCOPY W/BIOPSY(S): CPT | Performed by: INTERNAL MEDICINE

## 2023-06-29 PROCEDURE — 88172 CYTP DX EVAL FNA 1ST EA SITE: CPT

## 2023-06-29 RX ORDER — SODIUM CHLORIDE 9 MG/ML
INJECTION, SOLUTION INTRAVENOUS PRN
Status: DISCONTINUED | OUTPATIENT
Start: 2023-06-29 | End: 2023-06-29 | Stop reason: HOSPADM

## 2023-06-29 RX ORDER — SODIUM CHLORIDE 0.9 % (FLUSH) 0.9 %
5-40 SYRINGE (ML) INJECTION PRN
Status: DISCONTINUED | OUTPATIENT
Start: 2023-06-29 | End: 2023-06-29 | Stop reason: HOSPADM

## 2023-06-29 RX ORDER — DEXAMETHASONE SODIUM PHOSPHATE 10 MG/ML
INJECTION INTRAMUSCULAR; INTRAVENOUS PRN
Status: DISCONTINUED | OUTPATIENT
Start: 2023-06-29 | End: 2023-06-29 | Stop reason: SDUPTHER

## 2023-06-29 RX ORDER — FENTANYL CITRATE 50 UG/ML
50 INJECTION, SOLUTION INTRAMUSCULAR; INTRAVENOUS EVERY 5 MIN PRN
Status: DISCONTINUED | OUTPATIENT
Start: 2023-06-29 | End: 2023-06-29 | Stop reason: HOSPADM

## 2023-06-29 RX ORDER — ONDANSETRON 2 MG/ML
INJECTION INTRAMUSCULAR; INTRAVENOUS PRN
Status: DISCONTINUED | OUTPATIENT
Start: 2023-06-29 | End: 2023-06-29 | Stop reason: SDUPTHER

## 2023-06-29 RX ORDER — SODIUM CHLORIDE, SODIUM LACTATE, POTASSIUM CHLORIDE, CALCIUM CHLORIDE 600; 310; 30; 20 MG/100ML; MG/100ML; MG/100ML; MG/100ML
INJECTION, SOLUTION INTRAVENOUS CONTINUOUS PRN
Status: DISCONTINUED | OUTPATIENT
Start: 2023-06-29 | End: 2023-06-29 | Stop reason: SDUPTHER

## 2023-06-29 RX ORDER — PROPOFOL 10 MG/ML
INJECTION, EMULSION INTRAVENOUS PRN
Status: DISCONTINUED | OUTPATIENT
Start: 2023-06-29 | End: 2023-06-29 | Stop reason: SDUPTHER

## 2023-06-29 RX ORDER — GLYCOPYRROLATE 0.2 MG/ML
INJECTION INTRAMUSCULAR; INTRAVENOUS PRN
Status: DISCONTINUED | OUTPATIENT
Start: 2023-06-29 | End: 2023-06-29 | Stop reason: SDUPTHER

## 2023-06-29 RX ORDER — FENTANYL CITRATE 50 UG/ML
25 INJECTION, SOLUTION INTRAMUSCULAR; INTRAVENOUS EVERY 5 MIN PRN
Status: DISCONTINUED | OUTPATIENT
Start: 2023-06-29 | End: 2023-06-29 | Stop reason: HOSPADM

## 2023-06-29 RX ORDER — ALBUTEROL SULFATE 90 UG/1
AEROSOL, METERED RESPIRATORY (INHALATION) PRN
Status: DISCONTINUED | OUTPATIENT
Start: 2023-06-29 | End: 2023-06-29 | Stop reason: SDUPTHER

## 2023-06-29 RX ORDER — NEOSTIGMINE METHYLSULFATE 5 MG/5 ML
SYRINGE (ML) INTRAVENOUS PRN
Status: DISCONTINUED | OUTPATIENT
Start: 2023-06-29 | End: 2023-06-29 | Stop reason: SDUPTHER

## 2023-06-29 RX ORDER — SODIUM CHLORIDE 0.9 % (FLUSH) 0.9 %
5-40 SYRINGE (ML) INJECTION EVERY 12 HOURS SCHEDULED
Status: DISCONTINUED | OUTPATIENT
Start: 2023-06-29 | End: 2023-06-29 | Stop reason: HOSPADM

## 2023-06-29 RX ORDER — ONDANSETRON 2 MG/ML
4 INJECTION INTRAMUSCULAR; INTRAVENOUS
Status: DISCONTINUED | OUTPATIENT
Start: 2023-06-29 | End: 2023-06-29 | Stop reason: HOSPADM

## 2023-06-29 RX ORDER — FENTANYL CITRATE 50 UG/ML
INJECTION, SOLUTION INTRAMUSCULAR; INTRAVENOUS PRN
Status: DISCONTINUED | OUTPATIENT
Start: 2023-06-29 | End: 2023-06-29 | Stop reason: SDUPTHER

## 2023-06-29 RX ORDER — LIDOCAINE HYDROCHLORIDE 10 MG/ML
INJECTION, SOLUTION EPIDURAL; INFILTRATION; INTRACAUDAL; PERINEURAL PRN
Status: DISCONTINUED | OUTPATIENT
Start: 2023-06-29 | End: 2023-06-29 | Stop reason: SDUPTHER

## 2023-06-29 RX ORDER — MIDAZOLAM HYDROCHLORIDE 1 MG/ML
INJECTION INTRAMUSCULAR; INTRAVENOUS PRN
Status: DISCONTINUED | OUTPATIENT
Start: 2023-06-29 | End: 2023-06-29 | Stop reason: SDUPTHER

## 2023-06-29 RX ORDER — ROCURONIUM BROMIDE 10 MG/ML
INJECTION, SOLUTION INTRAVENOUS PRN
Status: DISCONTINUED | OUTPATIENT
Start: 2023-06-29 | End: 2023-06-29 | Stop reason: SDUPTHER

## 2023-06-29 RX ADMIN — SODIUM CHLORIDE, POTASSIUM CHLORIDE, SODIUM LACTATE AND CALCIUM CHLORIDE: 600; 310; 30; 20 INJECTION, SOLUTION INTRAVENOUS at 13:48

## 2023-06-29 RX ADMIN — ROCURONIUM BROMIDE 35 MG: 10 INJECTION, SOLUTION INTRAVENOUS at 13:57

## 2023-06-29 RX ADMIN — GLYCOPYRROLATE 0.8 MG: 0.2 INJECTION INTRAMUSCULAR; INTRAVENOUS at 15:24

## 2023-06-29 RX ADMIN — ONDANSETRON 4 MG: 2 INJECTION INTRAMUSCULAR; INTRAVENOUS at 14:53

## 2023-06-29 RX ADMIN — PROPOFOL 150 MG: 10 INJECTION, EMULSION INTRAVENOUS at 13:56

## 2023-06-29 RX ADMIN — FENTANYL CITRATE 100 MCG: 50 INJECTION, SOLUTION INTRAMUSCULAR; INTRAVENOUS at 14:04

## 2023-06-29 RX ADMIN — DEXAMETHASONE SODIUM PHOSPHATE 10 MG: 10 INJECTION INTRAMUSCULAR; INTRAVENOUS at 14:07

## 2023-06-29 RX ADMIN — LIDOCAINE HYDROCHLORIDE 5 ML: 10 INJECTION, SOLUTION EPIDURAL; INFILTRATION; INTRACAUDAL; PERINEURAL at 13:56

## 2023-06-29 RX ADMIN — MIDAZOLAM 2 MG: 1 INJECTION INTRAMUSCULAR; INTRAVENOUS at 13:53

## 2023-06-29 RX ADMIN — ALBUTEROL SULFATE 10 PUFF: 90 AEROSOL, METERED RESPIRATORY (INHALATION) at 15:40

## 2023-06-29 RX ADMIN — Medication 5 MG: at 15:24

## 2023-06-29 ASSESSMENT — PAIN SCALES - GENERAL: PAINLEVEL_OUTOF10: 0

## 2023-06-30 LAB
CASE NUMBER:: NORMAL
MICROORGANISM SPEC CULT: NORMAL
MICROORGANISM/AGENT SPEC: NORMAL
MICROORGANISM/AGENT SPEC: NORMAL
SPECIMEN DESCRIPTION: NORMAL
SPECIMEN DESCRIPTION: NORMAL

## 2023-07-01 LAB
MICROORGANISM SPEC CULT: NO GROWTH
MICROORGANISM/AGENT SPEC: NORMAL
MICROORGANISM/AGENT SPEC: NORMAL
MISCELLANEOUS LAB TEST RESULT: NORMAL
SPECIMEN DESCRIPTION: NORMAL
TEST NAME: NORMAL

## 2023-07-03 ENCOUNTER — PATIENT MESSAGE (OUTPATIENT)
Dept: PULMONOLOGY | Age: 43
End: 2023-07-03

## 2023-07-03 LAB
MICROORGANISM SPEC CULT: NORMAL
MICROORGANISM SPEC CULT: NORMAL
MICROORGANISM/AGENT SPEC: NORMAL
SPECIMEN DESCRIPTION: NORMAL
SPECIMEN DESCRIPTION: NORMAL
SURGICAL PATHOLOGY REPORT: NORMAL
SURGICAL PATHOLOGY REPORT: NORMAL

## 2023-07-03 NOTE — TELEPHONE ENCOUNTER
From: Carla Berman  To: Dr. Bhagat Reverin/3/2023 10:05 AM EDT  Subject: Question regarding MISCELLANEOUS SENDOUT 2    What do my results mean

## 2023-07-04 LAB
FLOW CYTOMETRY SOURCE: NORMAL
FLOW CYTOMETRY SOURCE: NORMAL
FLOW CYTOMETRY, NODE/FLUID: NORMAL
FLOW CYTOMETRY, NODE/FLUID: NORMAL

## 2023-07-05 ENCOUNTER — TELEPHONE (OUTPATIENT)
Dept: PULMONOLOGY | Age: 43
End: 2023-07-05

## 2023-07-05 DIAGNOSIS — D86.0 PULMONARY SARCOIDOSIS (HCC): Primary | ICD-10-CM

## 2023-07-05 RX ORDER — PREDNISONE 20 MG/1
20 TABLET ORAL DAILY
Qty: 30 TABLET | Refills: 2 | Status: SHIPPED | OUTPATIENT
Start: 2023-07-05 | End: 2023-07-10 | Stop reason: SDUPTHER

## 2023-07-05 NOTE — TELEPHONE ENCOUNTER
I called the patient regarding results of endoscopy with EBUS/TBNA. The results are consistent with diagnosis of sarcoidosis. Patient was updated about the results. Recommended initiation of systemic steroids. Discussed risk/benefits. We will start her on prednisone at 20 mg daily. She will need to follow-up in pulmonary clinic in about 6 weeks.

## 2023-07-07 LAB
CASE NUMBER:: NORMAL
SPECIMEN DESCRIPTION: NORMAL

## 2023-07-10 ENCOUNTER — TELEPHONE (OUTPATIENT)
Dept: PULMONOLOGY | Age: 43
End: 2023-07-10

## 2023-07-10 DIAGNOSIS — D86.0 PULMONARY SARCOIDOSIS (HCC): ICD-10-CM

## 2023-07-10 LAB
MICROORGANISM SPEC CULT: NORMAL
MICROORGANISM SPEC CULT: NORMAL
MICROORGANISM/AGENT SPEC: NORMAL
SPECIMEN DESCRIPTION: NORMAL
SPECIMEN DESCRIPTION: NORMAL

## 2023-07-10 RX ORDER — PREDNISONE 20 MG/1
20 TABLET ORAL DAILY
Qty: 30 TABLET | Refills: 2 | Status: SHIPPED | OUTPATIENT
Start: 2023-07-10 | End: 2023-10-08

## 2023-07-10 NOTE — TELEPHONE ENCOUNTER
Patient called and stated that she never received the script for prednisone. I looked and it was sent to the wrong pharmacy and patient wouldn't be able to  till this weekend with her job.   I pended a new script for your approval if you could please sign it

## 2023-07-12 ENCOUNTER — OFFICE VISIT (OUTPATIENT)
Dept: NEUROLOGY | Age: 43
End: 2023-07-12
Payer: COMMERCIAL

## 2023-07-12 VITALS
DIASTOLIC BLOOD PRESSURE: 87 MMHG | BODY MASS INDEX: 35.44 KG/M2 | WEIGHT: 200 LBS | SYSTOLIC BLOOD PRESSURE: 125 MMHG | HEART RATE: 81 BPM | TEMPERATURE: 97.8 F | HEIGHT: 63 IN

## 2023-07-12 DIAGNOSIS — Z86.79 S/P CEREBRAL ANEURYSM REPAIR: Primary | ICD-10-CM

## 2023-07-12 DIAGNOSIS — Z98.890 S/P CEREBRAL ANEURYSM REPAIR: Primary | ICD-10-CM

## 2023-07-12 DIAGNOSIS — I67.1 ANEURYSM OF LEFT INTERNAL CAROTID ARTERY: ICD-10-CM

## 2023-07-12 DIAGNOSIS — H53.122 TRANSIENT MONOCULAR BLINDNESS, LEFT: ICD-10-CM

## 2023-07-12 PROCEDURE — 99215 OFFICE O/P EST HI 40 MIN: CPT | Performed by: PSYCHIATRY & NEUROLOGY

## 2023-07-12 ASSESSMENT — ENCOUNTER SYMPTOMS
COLOR CHANGE: 0
COUGH: 0
SHORTNESS OF BREATH: 0
VOMITING: 0
PHOTOPHOBIA: 0
NAUSEA: 0
VOICE CHANGE: 0

## 2023-07-12 NOTE — PROGRESS NOTES
Endovascular Neurosurgery - [facility]  [address], [address]  P: [phone]  F: [phone]      Dear RUDOLPH Stevens-CNP    HPI:     HPI    Kacie Chapin is a 39 y.o. female following up from her pipeline embolization of her left superior hypophyseal artery aneurysm. She has recovered well from her stent occlusion with thrombectomy and revascularization with angioplasty and atlas stent. She has done well on aspirin since her last angigoram May 18, 2021. No new stroke deficits. She does note Occ episodes of tunnel vision of left eye only monocular. Lasting several seconds and once every couple weeks. At worst nearly pinpoint before improves. Can happen with and without a headache. Angiogram already showing a left ophthalmic artery origin stenosis          Allergies   Allergen Reactions    Amoxicillin Swelling     lips    Penicillins Other (See Comments)     Pt. Unsure of reaction     Current Outpatient Medications   Medication Sig Dispense Refill    predniSONE (DELTASONE) 20 MG tablet Take 1 tablet by mouth daily 30 tablet 2    amLODIPine (NORVASC) 5 MG tablet TAKE 1 TABLET BY MOUTH EVERY DAY 90 tablet 0    amitriptyline (ELAVIL) 25 MG tablet TAKE 2 TABLETS BY MOUTH EVERY NIGHT 180 tablet 3    Ubrogepant 100 MG TABS Take one at onset of migraine. May repeat in 2 hours.   No more than 2 in 24 hours and 4 in 1 week 10 tablet 5    Atogepant (QULIPTA) 60 MG TABS Take 60 mg by mouth daily 30 tablet 5    ASPIRIN LOW DOSE 81 MG EC tablet TAKE 1 TABLET BY MOUTH EVERY DAY 90 tablet 3     Current Facility-Administered Medications   Medication Dose Route Frequency Provider Last Rate Last Admin    levonorgestrel (MIRENA) IUD 52 mg 1 each  1 each IntraUTERine Once Jakub Ty MD   1 each at 03/09/21 0806     Past Medical History:   Diagnosis Date    Carotid aneurysm, left ([address])     CVA (cerebral vascular accident) ([address])     History of ischemic middle cerebral

## 2023-07-28 ENCOUNTER — HOSPITAL ENCOUNTER (OUTPATIENT)
Dept: MRI IMAGING | Age: 43
Discharge: HOME | End: 2023-07-28
Attending: PSYCHIATRY & NEUROLOGY
Payer: COMMERCIAL

## 2023-07-28 DIAGNOSIS — Z86.79 S/P CEREBRAL ANEURYSM REPAIR: ICD-10-CM

## 2023-07-28 DIAGNOSIS — I67.1 ANEURYSM OF LEFT INTERNAL CAROTID ARTERY: ICD-10-CM

## 2023-07-28 DIAGNOSIS — Z98.890 S/P CEREBRAL ANEURYSM REPAIR: ICD-10-CM

## 2023-07-28 DIAGNOSIS — H53.122 TRANSIENT MONOCULAR BLINDNESS, LEFT: ICD-10-CM

## 2023-07-28 LAB
CREAT SERPL-MCNC: 0.9 MG/DL (ref 0.5–0.9)
GFR SERPL CREATININE-BSD FRML MDRD: >60 ML/MIN/1.73M2

## 2023-07-28 PROCEDURE — 6360000004 HC RX CONTRAST MEDICATION: Performed by: PSYCHIATRY & NEUROLOGY

## 2023-07-28 PROCEDURE — A9579 GAD-BASE MR CONTRAST NOS,1ML: HCPCS | Performed by: PSYCHIATRY & NEUROLOGY

## 2023-07-28 PROCEDURE — 82565 ASSAY OF CREATININE: CPT

## 2023-07-28 PROCEDURE — 36415 COLL VENOUS BLD VENIPUNCTURE: CPT

## 2023-07-28 RX ADMIN — GADOTERIDOL 17 ML: 279.3 INJECTION, SOLUTION INTRAVENOUS at 08:46

## 2023-08-07 LAB
MICROORGANISM SPEC CULT: NORMAL
MICROORGANISM/AGENT SPEC: NORMAL
SPECIMEN DESCRIPTION: NORMAL

## 2023-08-10 ENCOUNTER — OFFICE VISIT (OUTPATIENT)
Dept: NEUROLOGY | Age: 43
End: 2023-08-10

## 2023-08-10 VITALS
HEART RATE: 70 BPM | DIASTOLIC BLOOD PRESSURE: 88 MMHG | HEIGHT: 62 IN | BODY MASS INDEX: 39.38 KG/M2 | WEIGHT: 214 LBS | SYSTOLIC BLOOD PRESSURE: 121 MMHG

## 2023-08-10 DIAGNOSIS — Z86.79 S/P CEREBRAL ANEURYSM REPAIR: Primary | ICD-10-CM

## 2023-08-10 DIAGNOSIS — Z98.890 S/P CEREBRAL ANEURYSM REPAIR: Primary | ICD-10-CM

## 2023-08-10 DIAGNOSIS — G43.019 INTRACTABLE MIGRAINE WITHOUT AURA AND WITHOUT STATUS MIGRAINOSUS: ICD-10-CM

## 2023-08-10 NOTE — PROGRESS NOTES
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 Patient was previously following with Levi Block. For migraine prophylaxis, she is taking Qulipta 60 mg daily and amitriptyline 25 mg daily. She is currently getting 1-2 migraines a week. She notes her headaches are rated 5-8/10 in severity. Headaches are located on the right fronto-temporal area that radiate to the entire head. Headaches are associated with photophobia, nausea, phonophobia. Laying in dark room improves her headache. She is taking Ubrelvy for her rescue therapy. 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 For secondary stroke prevention she is prescribed aspirin 81 mg daily.  She was

## 2023-08-14 LAB
MICROORGANISM SPEC CULT: NORMAL
MICROORGANISM/AGENT SPEC: NORMAL
SPECIMEN DESCRIPTION: NORMAL

## 2023-08-18 ENCOUNTER — OFFICE VISIT (OUTPATIENT)
Dept: PULMONOLOGY | Age: 43
End: 2023-08-18
Payer: COMMERCIAL

## 2023-08-18 VITALS
RESPIRATION RATE: 16 BRPM | BODY MASS INDEX: 39.38 KG/M2 | SYSTOLIC BLOOD PRESSURE: 111 MMHG | HEIGHT: 62 IN | HEART RATE: 74 BPM | OXYGEN SATURATION: 96 % | WEIGHT: 214 LBS | DIASTOLIC BLOOD PRESSURE: 81 MMHG | TEMPERATURE: 97.3 F

## 2023-08-18 DIAGNOSIS — D86.0 PULMONARY SARCOIDOSIS (HCC): Primary | ICD-10-CM

## 2023-08-18 PROCEDURE — 99214 OFFICE O/P EST MOD 30 MIN: CPT | Performed by: INTERNAL MEDICINE

## 2023-08-18 RX ORDER — PREDNISONE 20 MG/1
20 TABLET ORAL DAILY
Qty: 30 TABLET | Refills: 1 | Status: SHIPPED | OUTPATIENT
Start: 2023-08-18 | End: 2023-10-17

## 2023-08-18 NOTE — PROGRESS NOTES
occlusion. CURRENT SYMPTOMS:  See HPI    PULMONARY COMORBIDITIES/RISK FACTORS:     Yes No   NASO-BRONCHIAL/ENVIRONMENTAL ALLERGIES [] [x]   ASTHMA [] [x]   CHRONIC SINUSITIS/POSTNASAL DRIP [] [x]   GERD/ACID REFLUX [] [x]   ASPIRIN USE [x] []   CORONARY ARTERY DISEASE  []  [x]   CONGESTIVE CARDIAC FAILURE [] [x]   ATRIAL FIBRILLATION [] [x]   AMIODARONE USE [] [x]   PULMONARY HYPERTENSION [] [x]   VENOUS THROMBOEMBOLISM [] [x]   CHRONIC LIVER DISEASE/CIRRHOSIS [] [x]   CONNECTIVE TISSUE DISORDER [] [x]   DIAPHRAGMATIC DISORDER [] [x]   CHEST WALL RESTRICTION [] [x]   MORBID OBESITY [] [x]   AMBULATORY OXYGEN USE [] [x]   NOCTURNAL CPAP USE [] [x]   NOCTURNAL BIPAP/NIV USE [] [x]     CURRENT BRONCHODILATORS/OTHER PULMONARY MEDS: None    TOBACCO HISTORY:  She  reports that she has never smoked.  She has never used smokeless tobacco.    AVOCATION/OCCUPATIONAL EXPOSURE:  [] Asbestos [] Silica dust [] Coal [] Foundry [] [employer] [] [employer] [] Pets [] Exotic birds   [] Tuberculosis [] Hot tub  [] Drugs [] Others    PAST MEDICAL HISTORY:      Diagnosis Date    Carotid aneurysm, left ([address])     CVA (cerebral vascular accident) ([address])     History of ischemic middle cerebral artery stroke     Hypertension     Lung nodule     Mediastinal adenopathy     Migraines     Stroke ([address])     1/25/2020 stroke     PAST SURGICAL HISTORY:      Procedure Laterality Date    BRONCHOSCOPY  06/29/2023    BRONCHOSCOPY ENDOBRONCHIAL ULTRASOUND WITH PATHOLOGY, TRANSBRONCHIAL NEEDLE ASPIRATION (Right)    BRONCHOSCOPY Right 6/29/2023    BRONCHOSCOPY ENDOBRONCHIAL ULTRASOUND WITH PATHOLOGY, TRANSBRONCHIAL NEEDLE ASPIRATION performed by John Mayes MD at [address] [place] [address]  6/29/2023    BRONCHOSCOPY ALVEOLAR LAVAGE performed by John Mayes MD at [address] [place] [address]  6/29/2023    BRONCHOSCOPY BIOPSY BRONCHUS performed by John Mayes MD at [address] [facility]  01/25/2021    IR [address]

## 2023-08-24 DIAGNOSIS — I10 ESSENTIAL HYPERTENSION: ICD-10-CM

## 2023-08-24 RX ORDER — AMLODIPINE BESYLATE 5 MG/1
TABLET ORAL
Qty: 90 TABLET | Refills: 1 | Status: SHIPPED | OUTPATIENT
Start: 2023-08-24

## 2023-08-24 NOTE — TELEPHONE ENCOUNTER
Mary Kate Plascencia is calling to request a refill on the following medication(s):    Medication Request:  Requested Prescriptions     Pending Prescriptions Disp Refills    amLODIPine (NORVASC) 5 MG tablet [Pharmacy Med Name: AMLODIPINE BESYLATE 5 MG TAB] 90 tablet 1     Sig: TAKE 1 TABLET BY MOUTH EVERY DAY       Last Visit Date (If Applicable):  5/04/4187    Next Visit Date:    12/8/2023

## 2023-10-11 ENCOUNTER — TELEPHONE (OUTPATIENT)
Dept: NEUROLOGY | Age: 43
End: 2023-10-11

## 2023-10-20 ENCOUNTER — OFFICE VISIT (OUTPATIENT)
Dept: PULMONOLOGY | Age: 43
End: 2023-10-20
Payer: COMMERCIAL

## 2023-10-20 VITALS
SYSTOLIC BLOOD PRESSURE: 112 MMHG | RESPIRATION RATE: 16 BRPM | HEIGHT: 63 IN | BODY MASS INDEX: 38.62 KG/M2 | OXYGEN SATURATION: 93 % | WEIGHT: 218 LBS | DIASTOLIC BLOOD PRESSURE: 81 MMHG | HEART RATE: 73 BPM

## 2023-10-20 DIAGNOSIS — D86.0 PULMONARY SARCOIDOSIS (HCC): Primary | ICD-10-CM

## 2023-10-20 PROCEDURE — 99214 OFFICE O/P EST MOD 30 MIN: CPT | Performed by: INTERNAL MEDICINE

## 2023-10-20 RX ORDER — PREDNISONE 10 MG/1
15 TABLET ORAL DAILY
Qty: 135 TABLET | Refills: 0 | Status: SHIPPED | OUTPATIENT
Start: 2023-10-20 | End: 2024-01-18

## 2023-10-21 NOTE — PROGRESS NOTES
PULMONARY MEDICINE OUTPATIENT NOTE                                                                       PATIENT:  Guillermo Mendoza  YOB: 1980  MRN: [id number]     REFERRED BY: Esperanza Sol, RUDOLPH - CNP   CHIEF COMPLIANT: pulmonary sarcoidosis (pulmonary sarcoidosis- 2 month f/u)       HISTORY     Guillermo Mendoza is a 39y.o. year old female here for follow-up    INITIAL VISIT: 6/23/2023  BRONCHOSCOPY: 6/29/2023  LAST VISIT: 8/18/2023  TODAY's VISIT:10/20/2023    PULMONARY PROBLEM LIST:  1. Pulmonary sarcoidosis ([address])       HISTORY OF PRESENT ILLNESS: Patient is a non-smoker, however previously reported daily marijuana use. She was initially seen in the clinic after ER visit with chest pain and difficulty breathing. Her CT chest (6/15/2023) was reported to show bilateral mediastinal and hilar adenopathy, along with multiple bilateral pulmonary nodules, the largest nodule is 9 mm in the right lower lobe along with mild interstitial thickening/atelectasis at the bilateral lung bases. She denies any occupational exposure. Reports cough and sputum. PFT (6/23/2023) showed normal spirometry lung volumes, diffusion capacity is increased. S/p bronchoscopy EBUS/TBNA (6/29/2023) of station 7 and 10R stations, along with right middle lobe BAL. 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 AFB and fungal stains are negative. Patient was subsequently started on 20 mg of prednisone daily (7/5/2023). She reports improvement in her symptoms denies any recent flareups. She also had history of cerebral aneurysm for which she underwent coiling in January 2021. The procedure complicated by development of left MCA stroke and patient required mechanical thrombectomy due to left ICA occlusion.       CURRENT SYMPTOMS:  See HPI    PULMONARY COMORBIDITIES/RISK FACTORS:     Yes No

## 2023-12-08 ENCOUNTER — OFFICE VISIT (OUTPATIENT)
Dept: FAMILY MEDICINE CLINIC | Age: 43
End: 2023-12-08
Payer: COMMERCIAL

## 2023-12-08 ENCOUNTER — TELEPHONE (OUTPATIENT)
Dept: NEUROSURGERY | Age: 43
End: 2023-12-08

## 2023-12-08 VITALS
OXYGEN SATURATION: 91 % | HEART RATE: 66 BPM | BODY MASS INDEX: 41.33 KG/M2 | WEIGHT: 224.6 LBS | DIASTOLIC BLOOD PRESSURE: 80 MMHG | TEMPERATURE: 97.7 F | SYSTOLIC BLOOD PRESSURE: 118 MMHG

## 2023-12-08 DIAGNOSIS — I10 PRIMARY HYPERTENSION: Primary | ICD-10-CM

## 2023-12-08 DIAGNOSIS — Z12.31 ENCOUNTER FOR SCREENING MAMMOGRAM FOR MALIGNANT NEOPLASM OF BREAST: ICD-10-CM

## 2023-12-08 DIAGNOSIS — D86.9 SARCOIDOSIS: ICD-10-CM

## 2023-12-08 DIAGNOSIS — I67.1 CEREBRAL ANEURYSM: ICD-10-CM

## 2023-12-08 DIAGNOSIS — E78.2 MIXED HYPERLIPIDEMIA: ICD-10-CM

## 2023-12-08 DIAGNOSIS — Z79.52 LONG TERM SYSTEMIC STEROID USER: ICD-10-CM

## 2023-12-08 PROBLEM — R10.2 PELVIC PAIN: Status: RESOLVED | Noted: 2018-12-12 | Resolved: 2023-12-08

## 2023-12-08 PROCEDURE — 99214 OFFICE O/P EST MOD 30 MIN: CPT | Performed by: NURSE PRACTITIONER

## 2023-12-08 PROCEDURE — 3079F DIAST BP 80-89 MM HG: CPT | Performed by: NURSE PRACTITIONER

## 2023-12-08 PROCEDURE — 3074F SYST BP LT 130 MM HG: CPT | Performed by: NURSE PRACTITIONER

## 2023-12-08 ASSESSMENT — PATIENT HEALTH QUESTIONNAIRE - PHQ9
2. FEELING DOWN, DEPRESSED OR HOPELESS: 0
SUM OF ALL RESPONSES TO PHQ QUESTIONS 1-9: 0
1. LITTLE INTEREST OR PLEASURE IN DOING THINGS: 0
SUM OF ALL RESPONSES TO PHQ QUESTIONS 1-9: 0
SUM OF ALL RESPONSES TO PHQ QUESTIONS 1-9: 0
SUM OF ALL RESPONSES TO PHQ9 QUESTIONS 1 & 2: 0
SUM OF ALL RESPONSES TO PHQ QUESTIONS 1-9: 0

## 2023-12-08 ASSESSMENT — ENCOUNTER SYMPTOMS
STRIDOR: 0
VOMITING: 0
SHORTNESS OF BREATH: 1
COLOR CHANGE: 0
WHEEZING: 0
NAUSEA: 0
ALLERGIC/IMMUNOLOGIC NEGATIVE: 1
COUGH: 1
CHOKING: 0
DIARRHEA: 0
GASTROINTESTINAL NEGATIVE: 1

## 2023-12-08 NOTE — TELEPHONE ENCOUNTER
Patient calls in today and is requesting a letter for Jacquelin stating work restrictions are needed.    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 She states it is too much for her to do.    Please advise and route to clinical staff.

## 2023-12-08 NOTE — PROGRESS NOTES
[facility]  [address] [facility] [facility]  Dept: [phone]    Ludmila Ferrari is a 39 y.o. female who presents today for her medical conditions/complaintsas noted below. Ludmila Ferrari is here today c/o Hypertension and Hyperlipidemia    Past Medical History:   Diagnosis Date    Carotid aneurysm, left ([address])     CVA (cerebral vascular accident) [facility])     History of ischemic middle cerebral artery stroke     Hypertension     Lung nodule     Mediastinal adenopathy     Migraines     Stroke ([address])     1/25/2020 stroke      Past Surgical History:   Procedure Laterality Date    BRONCHOSCOPY  06/29/2023    BRONCHOSCOPY ENDOBRONCHIAL ULTRASOUND WITH PATHOLOGY, TRANSBRONCHIAL NEEDLE ASPIRATION (Right)    BRONCHOSCOPY Right 6/29/2023    BRONCHOSCOPY ENDOBRONCHIAL ULTRASOUND WITH PATHOLOGY, TRANSBRONCHIAL NEEDLE ASPIRATION performed by Verner Cork, MD at [address] [place] [address]  6/29/2023    BRONCHOSCOPY ALVEOLAR LAVAGE performed by Verner Cork, MD at [address] [place] [address]  6/29/2023    BRONCHOSCOPY BIOPSY BRONCHUS performed by Verner Cork, MD at [address] [facility]  01/25/2021     [address] MRI CONDITIONAL 3T OK. SAFE IMMEDIATELY POST IMPLANT.     CARDIAC CATHETERIZATION      IR ANGIOGRAM CAROTID CEREBRAL BILATERAL    INTRAUTERINE DEVICE INSERTION  03/09/2021    Mirena IUD [address] [address] Lot [id number] Exp May 2023    OPEN REPAIR PERIPHERAL ANEURYSM      brain aneurysm repair    OTHER SURGICAL HISTORY  05/18/2021    cerebral angiogram       Family History   Problem Relation Age of Onset    Kidney Disease Mother     Heart Disease Mother     Hypertension Mother     Migraines Mother     High Blood Pressure Mother     Heart Disease Father     Colon Cancer Father     Diabetes Father     Cancer Father         prostate    Heart Disease Brother        Social History     Tobacco Use    Smoking

## 2023-12-11 ENCOUNTER — HOSPITAL ENCOUNTER (OUTPATIENT)
Age: 43
Setting detail: SPECIMEN
Discharge: HOME | End: 2023-12-11

## 2023-12-11 DIAGNOSIS — I10 PRIMARY HYPERTENSION: ICD-10-CM

## 2023-12-11 DIAGNOSIS — E78.2 MIXED HYPERLIPIDEMIA: ICD-10-CM

## 2023-12-11 DIAGNOSIS — Z79.52 LONG TERM SYSTEMIC STEROID USER: ICD-10-CM

## 2023-12-11 LAB
BASOPHILS # BLD: 0.03 K/UL (ref 0–0.2)
BASOPHILS NFR BLD: 0 % (ref 0–2)
EOSINOPHIL # BLD: 0.25 K/UL (ref 0–0.44)
EOSINOPHILS RELATIVE PERCENT: 3 % (ref 1–4)
ERYTHROCYTE [DISTWIDTH] IN BLOOD BY AUTOMATED COUNT: 13.4 % (ref 11.8–14.4)
HCT VFR BLD AUTO: 45.2 % (ref 36.3–47.1)
HGB BLD-MCNC: 15 G/DL (ref 11.9–15.1)
IMM GRANULOCYTES # BLD AUTO: 0.07 K/UL (ref 0–0.3)
IMM GRANULOCYTES NFR BLD: 1 %
LYMPHOCYTES NFR BLD: 3.27 K/UL (ref 1.1–3.7)
LYMPHOCYTES RELATIVE PERCENT: 34 % (ref 24–43)
MCH RBC QN AUTO: 30.8 PG (ref 25.2–33.5)
MCHC RBC AUTO-ENTMCNC: 33.2 G/DL (ref 28.4–34.8)
MCV RBC AUTO: 92.8 FL (ref 82.6–102.9)
MONOCYTES NFR BLD: 0.77 K/UL (ref 0.1–1.2)
MONOCYTES NFR BLD: 8 % (ref 3–12)
NEUTROPHILS NFR BLD: 54 % (ref 36–65)
NEUTS SEG NFR BLD: 5.32 K/UL (ref 1.5–8.1)
NRBC BLD-RTO: 0 PER 100 WBC
PLATELET # BLD AUTO: 390 K/UL (ref 138–453)
PMV BLD AUTO: 10.7 FL (ref 8.1–13.5)
RBC # BLD AUTO: 4.87 M/UL (ref 3.95–5.11)
WBC OTHER # BLD: 9.7 K/UL (ref 3.5–11.3)

## 2023-12-12 DIAGNOSIS — E78.5 DYSLIPIDEMIA: Primary | ICD-10-CM

## 2023-12-12 DIAGNOSIS — Z86.73 HISTORY OF CVA (CEREBROVASCULAR ACCIDENT): ICD-10-CM

## 2023-12-12 PROBLEM — R73.03 PRE-DIABETES: Status: ACTIVE | Noted: 2023-12-12

## 2023-12-12 PROBLEM — N94.6 DYSMENORRHEA: Status: RESOLVED | Noted: 2018-12-12 | Resolved: 2023-12-12

## 2023-12-12 PROBLEM — R93.89 ABNORMAL CT OF THE CHEST: Status: RESOLVED | Noted: 2023-06-16 | Resolved: 2023-12-12

## 2023-12-12 LAB
ALBUMIN SERPL-MCNC: 3.7 G/DL (ref 3.5–5.2)
ALBUMIN/GLOB SERPL: 1.1 {RATIO} (ref 1–2.5)
ALP SERPL-CCNC: 51 U/L (ref 35–104)
ALT SERPL-CCNC: 10 U/L (ref 5–33)
ANION GAP SERPL CALCULATED.3IONS-SCNC: 8 MMOL/L (ref 9–17)
AST SERPL-CCNC: 16 U/L
BILIRUB SERPL-MCNC: 0.7 MG/DL (ref 0.3–1.2)
BUN SERPL-MCNC: 7 MG/DL (ref 6–20)
CALCIUM SERPL-MCNC: 8.8 MG/DL (ref 8.6–10.4)
CHLORIDE SERPL-SCNC: 106 MMOL/L (ref 98–107)
CHOLEST SERPL-MCNC: 193 MG/DL
CHOLESTEROL/HDL RATIO: 3
CO2 SERPL-SCNC: 27 MMOL/L (ref 20–31)
CREAT SERPL-MCNC: 0.9 MG/DL (ref 0.5–0.9)
EST. AVERAGE GLUCOSE BLD GHB EST-MCNC: 120 MG/DL
GFR SERPL CREATININE-BSD FRML MDRD: >60 ML/MIN/1.73M2
GLUCOSE SERPL-MCNC: 81 MG/DL (ref 70–99)
HBA1C MFR BLD: 5.8 % (ref 4–6)
HDLC SERPL-MCNC: 64 MG/DL
LDLC SERPL CALC-MCNC: 116 MG/DL (ref 0–130)
MAGNESIUM SERPL-MCNC: 2.1 MG/DL (ref 1.6–2.6)
POTASSIUM SERPL-SCNC: 4.4 MMOL/L (ref 3.7–5.3)
PROT SERPL-MCNC: 7.1 G/DL (ref 6.4–8.3)
SODIUM SERPL-SCNC: 141 MMOL/L (ref 135–144)
TRIGL SERPL-MCNC: 66 MG/DL
TSH SERPL DL<=0.05 MIU/L-ACNC: 1.98 UIU/ML (ref 0.3–5)

## 2023-12-12 RX ORDER — ATORVASTATIN CALCIUM 10 MG/1
10 TABLET, FILM COATED ORAL DAILY
Qty: 90 TABLET | Refills: 1 | Status: SHIPPED | OUTPATIENT
Start: 2023-12-12

## 2024-01-07 DIAGNOSIS — I67.1 ANEURYSM OF INTERNAL CAROTID ARTERY: ICD-10-CM

## 2024-01-08 RX ORDER — ASPIRIN 81 MG/1
81 TABLET, COATED ORAL DAILY
Qty: 90 TABLET | Refills: 3 | Status: SHIPPED | OUTPATIENT
Start: 2024-01-08

## 2024-01-08 NOTE — TELEPHONE ENCOUNTER
E-scribe requesting refill for Aspirin. Please review and e-scribe if applicable.     Last Visit Date: 04/28/2021    Next Visit Date: 07/17/2024

## 2024-01-12 ENCOUNTER — HOSPITAL ENCOUNTER (OUTPATIENT)
Dept: MAMMOGRAPHY | Age: 44
Discharge: HOME | End: 2024-01-12
Payer: COMMERCIAL

## 2024-01-12 VITALS — WEIGHT: 200 LBS | BODY MASS INDEX: 36.8 KG/M2 | HEIGHT: 62 IN

## 2024-01-12 DIAGNOSIS — Z12.31 ENCOUNTER FOR SCREENING MAMMOGRAM FOR MALIGNANT NEOPLASM OF BREAST: ICD-10-CM

## 2024-01-12 PROCEDURE — 77063 BREAST TOMOSYNTHESIS BI: CPT

## 2024-01-22 ENCOUNTER — HOSPITAL ENCOUNTER (OUTPATIENT)
Dept: CT IMAGING | Age: 44
Discharge: HOME | End: 2024-01-24
Attending: INTERNAL MEDICINE
Payer: COMMERCIAL

## 2024-01-22 DIAGNOSIS — D86.0 PULMONARY SARCOIDOSIS (HCC): ICD-10-CM

## 2024-01-22 PROCEDURE — 6360000004 HC RX CONTRAST MEDICATION: Performed by: INTERNAL MEDICINE

## 2024-01-22 PROCEDURE — 2580000003 HC RX 258: Performed by: INTERNAL MEDICINE

## 2024-01-22 PROCEDURE — 71260 CT THORAX DX C+: CPT

## 2024-01-22 RX ORDER — 0.9 % SODIUM CHLORIDE 0.9 %
80 INTRAVENOUS SOLUTION INTRAVENOUS ONCE
Status: DISCONTINUED | OUTPATIENT
Start: 2024-01-22 | End: 2024-01-25 | Stop reason: HOSPADM

## 2024-01-22 RX ORDER — SODIUM CHLORIDE 0.9 % (FLUSH) 0.9 %
10 SYRINGE (ML) INJECTION PRN
Status: DISCONTINUED | OUTPATIENT
Start: 2024-01-22 | End: 2024-01-25 | Stop reason: HOSPADM

## 2024-01-22 RX ADMIN — SODIUM CHLORIDE, PRESERVATIVE FREE 10 ML: 5 INJECTION INTRAVENOUS at 08:47

## 2024-01-22 RX ADMIN — Medication 80 ML: at 08:47

## 2024-01-22 RX ADMIN — IOPAMIDOL 75 ML: 755 INJECTION, SOLUTION INTRAVENOUS at 08:46

## 2024-02-01 ENCOUNTER — HOSPITAL ENCOUNTER (OUTPATIENT)
Dept: PULMONOLOGY | Age: 44
Discharge: HOME | End: 2024-02-01
Payer: COMMERCIAL

## 2024-02-01 DIAGNOSIS — D86.0 PULMONARY SARCOIDOSIS (HCC): ICD-10-CM

## 2024-02-01 LAB
DLCO %PRED: NORMAL
DLCO PRED: NORMAL
DLCO/VA %PRED: NORMAL
DLCO/VA PRED: NORMAL
DLCO/VA: NORMAL
DLCO: NORMAL
EXPIRATORY TIME-POST: NORMAL
EXPIRATORY TIME: NORMAL
FEF 25-75 %CHNG: NORMAL
FEF 25-75 POST %PRED: NORMAL
FEF 25-75% %PRED-PRE: NORMAL
FEF 25-75% PRED: NORMAL
FEF 25-75-POST: NORMAL
FEF 25-75-PRE: NORMAL
FEV1 %PRED-POST: NORMAL
FEV1 %PRED-PRE: NORMAL
FEV1 PRED: NORMAL
FEV1-POST: NORMAL
FEV1-PRE: NORMAL
FEV1/FVC %PRED-POST: NORMAL
FEV1/FVC %PRED-PRE: NORMAL
FEV1/FVC PRED: NORMAL
FEV1/FVC-POST: NORMAL
FEV1/FVC-PRE: NORMAL
FVC %PRED-POST: NORMAL
FVC %PRED-PRE: NORMAL
FVC PRED: NORMAL
FVC-POST: NORMAL
FVC-PRE: NORMAL
GAW %PRED: NORMAL
GAW PRED: NORMAL
GAW: NORMAL
IC PRE %PRED: NORMAL
IC PRED: NORMAL
IC: NORMAL
MEP: NORMAL
MIP: NORMAL
MVV %PRED-PRE: NORMAL
MVV PRED: NORMAL
MVV-PRE: NORMAL
PEF %PRED-POST: NORMAL
PEF %PRED-PRE: NORMAL
PEF PRED: NORMAL
PEF%CHNG: NORMAL
PEF-POST: NORMAL
PEF-PRE: NORMAL
RAW %PRED: NORMAL
RAW PRED: NORMAL
RAW: NORMAL
RV PRE %PRED: NORMAL
RV PRED: NORMAL
RV: NORMAL
SVC %PRED: NORMAL
SVC PRED: NORMAL
SVC: NORMAL
TLC PRE %PRED: NORMAL
TLC PRED: NORMAL
TLC: NORMAL
VA %PRED: NORMAL
VA PRED: NORMAL
VA: NORMAL
VTG %PRED: NORMAL
VTG PRED: NORMAL
VTG: NORMAL

## 2024-02-01 PROCEDURE — 94060 EVALUATION OF WHEEZING: CPT

## 2024-02-01 PROCEDURE — 6370000000 HC RX 637 (ALT 250 FOR IP): Performed by: INTERNAL MEDICINE

## 2024-02-01 PROCEDURE — 94726 PLETHYSMOGRAPHY LUNG VOLUMES: CPT

## 2024-02-01 PROCEDURE — 94729 DIFFUSING CAPACITY: CPT

## 2024-02-01 RX ORDER — ALBUTEROL SULFATE 90 UG/1
2 AEROSOL, METERED RESPIRATORY (INHALATION) ONCE
Status: COMPLETED | OUTPATIENT
Start: 2024-02-01 | End: 2024-02-01

## 2024-02-01 RX ADMIN — ALBUTEROL SULFATE 2 PUFF: 90 AEROSOL, METERED RESPIRATORY (INHALATION) at 11:38

## 2024-02-02 ENCOUNTER — OFFICE VISIT (OUTPATIENT)
Dept: PULMONOLOGY | Age: 44
End: 2024-02-02
Payer: COMMERCIAL

## 2024-02-02 VITALS
WEIGHT: 220 LBS | RESPIRATION RATE: 18 BRPM | OXYGEN SATURATION: 95 % | DIASTOLIC BLOOD PRESSURE: 85 MMHG | HEART RATE: 81 BPM | HEIGHT: 63 IN | SYSTOLIC BLOOD PRESSURE: 115 MMHG | BODY MASS INDEX: 38.98 KG/M2

## 2024-02-02 DIAGNOSIS — R91.8 MULTIPLE PULMONARY NODULES: Primary | ICD-10-CM

## 2024-02-02 DIAGNOSIS — R59.0 HILAR LYMPHADENOPATHY: ICD-10-CM

## 2024-02-02 DIAGNOSIS — D86.9 SARCOIDOSIS: ICD-10-CM

## 2024-02-02 PROCEDURE — 99214 OFFICE O/P EST MOD 30 MIN: CPT | Performed by: INTERNAL MEDICINE

## 2024-02-02 PROCEDURE — G8417 CALC BMI ABV UP PARAM F/U: HCPCS | Performed by: INTERNAL MEDICINE

## 2024-02-02 PROCEDURE — G8427 DOCREV CUR MEDS BY ELIG CLIN: HCPCS | Performed by: INTERNAL MEDICINE

## 2024-02-02 PROCEDURE — 1036F TOBACCO NON-USER: CPT | Performed by: INTERNAL MEDICINE

## 2024-02-02 PROCEDURE — G8484 FLU IMMUNIZE NO ADMIN: HCPCS | Performed by: INTERNAL MEDICINE

## 2024-02-02 RX ORDER — PREDNISONE 10 MG/1
15 TABLET ORAL DAILY
Qty: 90 TABLET | Refills: 0 | Status: SHIPPED | OUTPATIENT
Start: 2024-02-02 | End: 2024-04-02

## 2024-02-02 NOTE — PROGRESS NOTES
Patient had PFT yesterday  
are provided for review.  MIP  images are provided for review. Automated exposure control, iterative  reconstruction, and/or weight based adjustment of the mA/kV was utilized to  reduce the radiation dose to as low as reasonably achievable.    COMPARISON:  Chest x-ray dated 06/15/2023.    HISTORY:  ORDERING SYSTEM PROVIDED HISTORY: DAWSON, DENICE  TECHNOLOGIST PROVIDED HISTORY:  DAWSON, DENICE  Decision Support Exception - unselect if not a suspected or confirmed  emergency medical condition->Emergency Medical Condition (MA)  Reason for Exam: SOB, Chest pain    FINDINGS:  Pulmonary Arteries: Pulmonary arteries are adequately opacified for  evaluation.  No evidence of intraluminal filling defect to suggest pulmonary  embolism.  Main pulmonary artery is normal in caliber.    Mediastinum: Heart is normal in size.  There is no pericardial effusion.  Thoracic aorta is normal in caliber.  There is mediastinal and bilateral  hilar lymphadenopathy.  AP window lymph nodes measure up to 1 cm in short  axis dimension.  Right hilar lymph nodes measure up to 1.7 cm.  Left hilar  lymph nodes measure up to 1.7 cm.  No axillary or internal mammary  lymphadenopathy.    Lungs/pleura: There are multiple bilateral pulmonary nodules, more  significant on the right, measuring up to 0.9 cm in the right lower lobe.  Nodules on the left measure up to 0.4 cm.  There is mild interstitial  thickening at the lung bases, possibly related to atelectasis.  There is mild  nonspecific pleural thickening at the upper lungs.  No airspace  consolidation, pneumothorax, or pleural effusion.    Upper Abdomen: Limited images of the upper abdomen are unremarkable.    Soft Tissues/Bones: No acute bone or soft tissue abnormality.    Impression  1.  No evidence of pulmonary embolism.    2.   Prominent mediastinal and bilateral hilar lymphadenopathy, which could  be reactive, metastatic, lymphoproliferative, or related to granulomatous  disease/sarcoidosis.    3.  Multiple

## 2024-02-05 ENCOUNTER — OFFICE VISIT (OUTPATIENT)
Dept: NEUROLOGY | Age: 44
End: 2024-02-05
Payer: COMMERCIAL

## 2024-02-05 VITALS
WEIGHT: 222.4 LBS | SYSTOLIC BLOOD PRESSURE: 116 MMHG | DIASTOLIC BLOOD PRESSURE: 81 MMHG | BODY MASS INDEX: 40.93 KG/M2 | HEIGHT: 62 IN | HEART RATE: 69 BPM

## 2024-02-05 DIAGNOSIS — I67.1 ANEURYSM OF INTERNAL CAROTID ARTERY: Primary | ICD-10-CM

## 2024-02-05 DIAGNOSIS — G43.019 INTRACTABLE MIGRAINE WITHOUT AURA AND WITHOUT STATUS MIGRAINOSUS: ICD-10-CM

## 2024-02-05 PROCEDURE — G8427 DOCREV CUR MEDS BY ELIG CLIN: HCPCS | Performed by: STUDENT IN AN ORGANIZED HEALTH CARE EDUCATION/TRAINING PROGRAM

## 2024-02-05 PROCEDURE — G8484 FLU IMMUNIZE NO ADMIN: HCPCS | Performed by: STUDENT IN AN ORGANIZED HEALTH CARE EDUCATION/TRAINING PROGRAM

## 2024-02-05 PROCEDURE — 99214 OFFICE O/P EST MOD 30 MIN: CPT | Performed by: STUDENT IN AN ORGANIZED HEALTH CARE EDUCATION/TRAINING PROGRAM

## 2024-02-05 PROCEDURE — 1036F TOBACCO NON-USER: CPT | Performed by: STUDENT IN AN ORGANIZED HEALTH CARE EDUCATION/TRAINING PROGRAM

## 2024-02-05 PROCEDURE — G8417 CALC BMI ABV UP PARAM F/U: HCPCS | Performed by: STUDENT IN AN ORGANIZED HEALTH CARE EDUCATION/TRAINING PROGRAM

## 2024-02-05 RX ORDER — ATOGEPANT 60 MG/1
60 TABLET ORAL DAILY
Qty: 30 TABLET | Refills: 5 | Status: SHIPPED | OUTPATIENT
Start: 2024-02-05

## 2024-02-05 RX ORDER — AMITRIPTYLINE HYDROCHLORIDE 75 MG/1
TABLET ORAL
Qty: 30 TABLET | Refills: 3 | Status: SHIPPED | OUTPATIENT
Start: 2024-02-05

## 2024-02-05 NOTE — PROGRESS NOTES
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      For migraine prophylaxis, she is taking Qulipta 60 mg daily and amitriptyline 50 mg daily. She is currently getting 2-3 migraines a week. She notes her headaches are rated 5-8/10 in severity. Headaches are located on the right fronto-temporal area that radiate to the entire head. Headaches are associated with photophobia, nausea, phonophobia. Laying in dark room improves her headache. She is taking Ubrelvy for her rescue therapy. She notes it makes her drowsy.     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 For secondary stroke prevention she is prescribed aspirin 81 mg daily.         Testing reviewed:     CTA Head

## 2024-02-16 ENCOUNTER — TELEPHONE (OUTPATIENT)
Dept: PULMONOLOGY | Age: 44
End: 2024-02-16

## 2024-02-16 NOTE — TELEPHONE ENCOUNTER
Patient came into the office and dropped off [place] paperwork for you to fill out. She stated you spoke about it at her visit.  I put them on your desk. Thanks!  I will fax when done and call patient to inform.

## 2024-03-15 ENCOUNTER — HOSPITAL ENCOUNTER (OUTPATIENT)
Age: 44
Setting detail: SPECIMEN
Discharge: HOME | End: 2024-03-15

## 2024-03-15 DIAGNOSIS — E78.5 DYSLIPIDEMIA: ICD-10-CM

## 2024-03-15 DIAGNOSIS — Z86.73 HISTORY OF CVA (CEREBROVASCULAR ACCIDENT): ICD-10-CM

## 2024-03-15 LAB
ALBUMIN SERPL-MCNC: 3.8 G/DL (ref 3.5–5.2)
ALBUMIN/GLOB SERPL: 1 {RATIO} (ref 1–2.5)
ALP SERPL-CCNC: 49 U/L (ref 35–104)
ALT SERPL-CCNC: 14 U/L (ref 10–35)
AST SERPL-CCNC: 16 U/L (ref 10–35)
BILIRUB DIRECT SERPL-MCNC: <0.2 MG/DL (ref 0–0.3)
BILIRUB INDIRECT SERPL-MCNC: 0.5 MG/DL (ref 0–1)
BILIRUB SERPL-MCNC: 0.7 MG/DL (ref 0–1.2)
CHOLEST SERPL-MCNC: 179 MG/DL (ref 0–199)
CHOLESTEROL/HDL RATIO: 3
GLOBULIN SER CALC-MCNC: 3.3 G/DL
HDLC SERPL-MCNC: 64 MG/DL
LDLC SERPL CALC-MCNC: 100 MG/DL (ref 0–100)
PROT SERPL-MCNC: 7.1 G/DL (ref 6.6–8.7)
TRIGL SERPL-MCNC: 76 MG/DL (ref 0–149)
VLDLC SERPL CALC-MCNC: 15 MG/DL

## 2024-04-12 ENCOUNTER — OFFICE VISIT (OUTPATIENT)
Dept: PULMONOLOGY | Age: 44
End: 2024-04-12
Payer: COMMERCIAL

## 2024-04-12 VITALS
HEART RATE: 72 BPM | OXYGEN SATURATION: 96 % | BODY MASS INDEX: 41.59 KG/M2 | TEMPERATURE: 97 F | DIASTOLIC BLOOD PRESSURE: 76 MMHG | WEIGHT: 226 LBS | SYSTOLIC BLOOD PRESSURE: 105 MMHG | HEIGHT: 62 IN | RESPIRATION RATE: 18 BRPM

## 2024-04-12 DIAGNOSIS — R91.8 MULTIPLE PULMONARY NODULES: ICD-10-CM

## 2024-04-12 DIAGNOSIS — D86.9 SARCOIDOSIS: Primary | ICD-10-CM

## 2024-04-12 PROCEDURE — 1036F TOBACCO NON-USER: CPT | Performed by: INTERNAL MEDICINE

## 2024-04-12 PROCEDURE — 99214 OFFICE O/P EST MOD 30 MIN: CPT | Performed by: INTERNAL MEDICINE

## 2024-04-12 PROCEDURE — G8417 CALC BMI ABV UP PARAM F/U: HCPCS | Performed by: INTERNAL MEDICINE

## 2024-04-12 PROCEDURE — G8427 DOCREV CUR MEDS BY ELIG CLIN: HCPCS | Performed by: INTERNAL MEDICINE

## 2024-04-12 RX ORDER — PREDNISONE 10 MG/1
10 TABLET ORAL DAILY
Qty: 90 TABLET | Refills: 0 | Status: SHIPPED | OUTPATIENT
Start: 2024-04-12 | End: 2024-07-11

## 2024-04-12 NOTE — PROGRESS NOTES
PULMONARY MEDICINE OUTPATIENT NOTE                                                                       PATIENT:  Payal Sanderson  YOB: 1980  MRN: [id number]     REFERRED BY: Tammie Lock, APRN - CNP   CHIEF COMPLIANT: Pulmonary sarcoidosis (Follow up )       HISTORY     Payal Sanderson is a 44 y.o. year old female here for follow-up    TODAY's VISIT: 4/12/2024  LAST VISIT: 2/2/2024  INITIAL VISIT: 6/23/2023    BRONCHOSCOPY: 6/29/2023    PULMONARY PROBLEM LIST:  1. Sarcoidosis    2. Multiple pulmonary nodules        HISTORY OF PRESENT ILLNESS/CURRENT SYMPTOMS:   Today, she states she is taking 15mg prednisone daily for management of sarcoidosis. She previously took 20mg, then 15mg, then stopped, but has since resumed 15mg. Denies productive cough. Reports following with an eye doctor and cardiologist, denies issues with her eyes or heart at this time. She has not received her influenza or COVID boosters.    Patient is a non-smoker, however previously reported daily marijuana use. She was initially seen in the clinic after ER visit with chest pain and difficulty breathing. Patient was initially started on 20 mg of prednisone daily (July 2023).  She reported improvement in her in the dose of prednisone was dropped to 15 mg daily about 3 months back.      PFT 6/23/2023) showed normal FEV1/FVC ratio, normal TLC and RV, diffusion capacity was increased  PFT (2/1/2024) shows normal FEV1/FVC ratio, normal TLC, mildly reduced RV, normal diffusion capacity    CT chest (6/15/2023) was reported to show bilateral mediastinal and hilar adenopathy, along with multiple bilateral pulmonary nodules, the largest nodule is 9 mm in the right lower lobe along with mild interstitial thickening/atelectasis at the bilateral lung bases.   CT chest (1/23/2024) showed improvement in pulmonary nodules and lymphadenopathy.     Bronchoscopy EBUS/TBNA (6/29/2023) of station 7 and 10R stations, along with right middle lobe

## 2024-04-24 DIAGNOSIS — I10 ESSENTIAL HYPERTENSION: ICD-10-CM

## 2024-04-24 NOTE — TELEPHONE ENCOUNTER
Payal Sanderson is calling to request a refill on the following medication(s):    Medication Request:  Requested Prescriptions     Pending Prescriptions Disp Refills    amLODIPine (NORVASC) 5 MG tablet [Pharmacy Med Name: AMLODIPINE BESYLATE 5 MG TAB] 180 tablet 0     Sig: take 1 tablet by mouth once daily       Last Visit Date (If Applicable):  12/8/2023    Next Visit Date:    6/14/2024

## 2024-04-25 RX ORDER — AMLODIPINE BESYLATE 5 MG/1
5 TABLET ORAL DAILY
Qty: 90 TABLET | Refills: 1 | Status: SHIPPED | OUTPATIENT
Start: 2024-04-25

## 2024-06-03 ENCOUNTER — OFFICE VISIT (OUTPATIENT)
Dept: NEUROLOGY | Age: 44
End: 2024-06-03
Payer: COMMERCIAL

## 2024-06-03 VITALS
WEIGHT: 222.8 LBS | BODY MASS INDEX: 39.48 KG/M2 | SYSTOLIC BLOOD PRESSURE: 124 MMHG | HEIGHT: 63 IN | DIASTOLIC BLOOD PRESSURE: 88 MMHG | HEART RATE: 97 BPM

## 2024-06-03 DIAGNOSIS — G43.019 INTRACTABLE MIGRAINE WITHOUT AURA AND WITHOUT STATUS MIGRAINOSUS: ICD-10-CM

## 2024-06-03 DIAGNOSIS — Z86.73 HISTORY OF ISCHEMIC MIDDLE CEREBRAL ARTERY STROKE: ICD-10-CM

## 2024-06-03 DIAGNOSIS — I67.1 ANEURYSM OF INTERNAL CAROTID ARTERY: Primary | ICD-10-CM

## 2024-06-03 PROCEDURE — G8417 CALC BMI ABV UP PARAM F/U: HCPCS | Performed by: STUDENT IN AN ORGANIZED HEALTH CARE EDUCATION/TRAINING PROGRAM

## 2024-06-03 PROCEDURE — G8427 DOCREV CUR MEDS BY ELIG CLIN: HCPCS | Performed by: STUDENT IN AN ORGANIZED HEALTH CARE EDUCATION/TRAINING PROGRAM

## 2024-06-03 PROCEDURE — 99214 OFFICE O/P EST MOD 30 MIN: CPT | Performed by: STUDENT IN AN ORGANIZED HEALTH CARE EDUCATION/TRAINING PROGRAM

## 2024-06-03 PROCEDURE — G2211 COMPLEX E/M VISIT ADD ON: HCPCS | Performed by: STUDENT IN AN ORGANIZED HEALTH CARE EDUCATION/TRAINING PROGRAM

## 2024-06-03 PROCEDURE — 1036F TOBACCO NON-USER: CPT | Performed by: STUDENT IN AN ORGANIZED HEALTH CARE EDUCATION/TRAINING PROGRAM

## 2024-06-03 RX ORDER — AMITRIPTYLINE HYDROCHLORIDE 50 MG/1
100 TABLET, FILM COATED ORAL NIGHTLY
Qty: 100 TABLET | Refills: 3 | Status: SHIPPED | OUTPATIENT
Start: 2024-06-03

## 2024-06-03 RX ORDER — PROPRANOLOL HCL 60 MG
60 CAPSULE, EXTENDED RELEASE 24HR ORAL DAILY
Qty: 30 CAPSULE | Refills: 2 | Status: SHIPPED | OUTPATIENT
Start: 2024-06-03 | End: 2025-05-29

## 2024-06-03 NOTE — PATIENT INSTRUCTIONS
Take two tablets of amitriptyline 50 mg nightly (total 100 mg )  Start propanolol 60 mg daily  Continue Qulipta 60 mg daily

## 2024-06-03 NOTE — PROGRESS NOTES
[facility] [place]            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      For migraine prophylaxis, she is taking Qulipta 60 mg daily and amitriptyline 150 mg daily. Dose of elavil was increased at last visit. She is still having 2-3 migraines a week. She notes she does feel more sleepy in the mornings after taking it.  She notes her headaches are rated 5-8/10 in severity. Headaches are located on the right fronto-temporal area that radiate to the entire head. Headaches are associated with photophobia, nausea, phonophobia. Laying in dark room improves her headache. She is taking Ubrelvy for her rescue therapy. She notes it makes her drowsy.     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

## 2024-06-14 ENCOUNTER — OFFICE VISIT (OUTPATIENT)
Dept: FAMILY MEDICINE CLINIC | Age: 44
End: 2024-06-14
Payer: COMMERCIAL

## 2024-06-14 VITALS
OXYGEN SATURATION: 95 % | SYSTOLIC BLOOD PRESSURE: 130 MMHG | HEIGHT: 63 IN | HEART RATE: 69 BPM | TEMPERATURE: 97.9 F | DIASTOLIC BLOOD PRESSURE: 84 MMHG | BODY MASS INDEX: 39.44 KG/M2 | WEIGHT: 222.6 LBS

## 2024-06-14 DIAGNOSIS — E66.1 CLASS 3 DRUG-INDUCED OBESITY WITH SERIOUS COMORBIDITY AND BODY MASS INDEX (BMI) OF 40.0 TO 44.9 IN ADULT (HCC): ICD-10-CM

## 2024-06-14 DIAGNOSIS — Z00.00 ROUTINE PHYSICAL EXAMINATION: Primary | ICD-10-CM

## 2024-06-14 DIAGNOSIS — Z12.11 SCREENING FOR COLON CANCER: ICD-10-CM

## 2024-06-14 DIAGNOSIS — Z79.52 LONG TERM SYSTEMIC STEROID USER: ICD-10-CM

## 2024-06-14 DIAGNOSIS — Z80.0 FAMILY HISTORY OF COLON CANCER IN FATHER: ICD-10-CM

## 2024-06-14 PROBLEM — I63.9 STROKE (HCC): Status: RESOLVED | Noted: 2023-06-23 | Resolved: 2024-06-14

## 2024-06-14 PROCEDURE — 99396 PREV VISIT EST AGE 40-64: CPT | Performed by: NURSE PRACTITIONER

## 2024-06-14 ASSESSMENT — ENCOUNTER SYMPTOMS
EYES NEGATIVE: 1
NAUSEA: 0
ANAL BLEEDING: 0
WHEEZING: 0
SHORTNESS OF BREATH: 1
ALLERGIC/IMMUNOLOGIC NEGATIVE: 1
CHEST TIGHTNESS: 0
CHOKING: 0
DIARRHEA: 0
BLOOD IN STOOL: 0
VOMITING: 0
COLOR CHANGE: 0
GASTROINTESTINAL NEGATIVE: 1
COUGH: 1
STRIDOR: 0

## 2024-06-14 ASSESSMENT — PATIENT HEALTH QUESTIONNAIRE - PHQ9
1. LITTLE INTEREST OR PLEASURE IN DOING THINGS: NOT AT ALL
SUM OF ALL RESPONSES TO PHQ QUESTIONS 1-9: 0
SUM OF ALL RESPONSES TO PHQ QUESTIONS 1-9: 0
SUM OF ALL RESPONSES TO PHQ9 QUESTIONS 1 & 2: 0
SUM OF ALL RESPONSES TO PHQ QUESTIONS 1-9: 0
SUM OF ALL RESPONSES TO PHQ QUESTIONS 1-9: 0
2. FEELING DOWN, DEPRESSED OR HOPELESS: NOT AT ALL

## 2024-06-14 NOTE — PROGRESS NOTES
labs when due  Encouraged regular exercise, healthy diet & weight loss  Preventative care reviewed  Immunizations discussed & Encouraged     2. Long term systemic steroid user    - CBC with Auto Differential; Future  - Comprehensive Metabolic Panel; Future  - Lipid, Fasting; Future  - Hemoglobin A1C; Future    Discussed initiating DEXA sooner than average due to steroid use     3. Screening for colon cancer    - [facility]    4. Family history of colon cancer in father    - [facility]    5. Class 3 drug-induced obesity with serious comorbidity and body mass index (BMI) of 40.0 to 44.9 in adult (HCC)    - CBC with Auto Differential; Future  - Comprehensive Metabolic Panel; Future  - TSH with Reflex; Future  - Lipid, Fasting; Future  - Hemoglobin A1C; Future    We discussed she'd be a good candidate for GLP1 for obesity due to prednisone, pt will consider & check w/ insurance if desired       Discussed use, benefit, and side effects of prescribed medications.All patient questions answered.  Pt voiced understanding. Reviewed health maintenance.Instructed to continue current medications, diet and exercise.  Patient agreedwith treatment plan. Follow up as directed.     Electronically signed by RUDOLPH Ryan CNP on 6/14/2024

## 2024-06-17 ENCOUNTER — TELEPHONE (OUTPATIENT)
Dept: GASTROENTEROLOGY | Age: 44
End: 2024-06-17

## 2024-06-17 NOTE — TELEPHONE ENCOUNTER
Procedure scheduled/Regdani  Procedure:colonoscopy  Dx: colon screening/ family Hx  Ordering: Tammie Lock APRN-CNP   Date: 08/16/24  Time: 12:30 PM/ arrival 11:00 am  Hospital:    [facility]  Bowel prep instructions sent to patient: Sup[facility]  Patient advised by phone/mail  both

## 2024-06-18 RX ORDER — SODIUM, POTASSIUM,MAG SULFATES 17.5-3.13G
1 SOLUTION, RECONSTITUTED, ORAL ORAL ONCE
Qty: 1 EACH | Refills: 0 | Status: SHIPPED | OUTPATIENT
Start: 2024-06-18 | End: 2024-06-18

## 2024-06-20 NOTE — TELEPHONE ENCOUNTER
Pt. Ok to [facility] from 8/16 to 8/29 @ 12:30 pm with   11:00 am arrival    Same prep pt understands.

## 2024-07-17 ENCOUNTER — TELEPHONE (OUTPATIENT)
Dept: NEUROLOGY | Age: 44
End: 2024-07-17

## 2024-07-17 ENCOUNTER — OFFICE VISIT (OUTPATIENT)
Dept: NEUROLOGY | Age: 44
End: 2024-07-17
Payer: COMMERCIAL

## 2024-07-17 VITALS
DIASTOLIC BLOOD PRESSURE: 88 MMHG | HEIGHT: 63 IN | WEIGHT: 217 LBS | HEART RATE: 59 BPM | SYSTOLIC BLOOD PRESSURE: 124 MMHG | BODY MASS INDEX: 38.45 KG/M2

## 2024-07-17 DIAGNOSIS — Z86.79 S/P CEREBRAL ANEURYSM REPAIR: ICD-10-CM

## 2024-07-17 DIAGNOSIS — I67.1 ANEURYSM OF LEFT INTERNAL CAROTID ARTERY: Primary | ICD-10-CM

## 2024-07-17 DIAGNOSIS — Z98.890 S/P CEREBRAL ANEURYSM REPAIR: ICD-10-CM

## 2024-07-17 PROCEDURE — 1036F TOBACCO NON-USER: CPT | Performed by: PSYCHIATRY & NEUROLOGY

## 2024-07-17 PROCEDURE — G8417 CALC BMI ABV UP PARAM F/U: HCPCS | Performed by: PSYCHIATRY & NEUROLOGY

## 2024-07-17 PROCEDURE — 99215 OFFICE O/P EST HI 40 MIN: CPT | Performed by: PSYCHIATRY & NEUROLOGY

## 2024-07-17 PROCEDURE — G8427 DOCREV CUR MEDS BY ELIG CLIN: HCPCS | Performed by: PSYCHIATRY & NEUROLOGY

## 2024-07-17 RX ORDER — PREDNISONE 10 MG/1
10 TABLET ORAL DAILY
COMMUNITY

## 2024-07-17 ASSESSMENT — ENCOUNTER SYMPTOMS
COUGH: 0
VOMITING: 0
PHOTOPHOBIA: 0
COLOR CHANGE: 0
VOICE CHANGE: 0
SHORTNESS OF BREATH: 0
NAUSEA: 0

## 2024-07-17 NOTE — TELEPHONE ENCOUNTER
[facility] paperwork was received. Blank copy of form has been scanned.

## 2024-07-26 ENCOUNTER — PATIENT MESSAGE (OUTPATIENT)
Dept: FAMILY MEDICINE CLINIC | Age: 44
End: 2024-07-26

## 2024-07-26 ENCOUNTER — OFFICE VISIT (OUTPATIENT)
Dept: PULMONOLOGY | Age: 44
End: 2024-07-26
Payer: COMMERCIAL

## 2024-07-26 VITALS
TEMPERATURE: 97 F | RESPIRATION RATE: 18 BRPM | SYSTOLIC BLOOD PRESSURE: 127 MMHG | BODY MASS INDEX: 39.56 KG/M2 | OXYGEN SATURATION: 96 % | DIASTOLIC BLOOD PRESSURE: 86 MMHG | WEIGHT: 215 LBS | HEART RATE: 54 BPM | HEIGHT: 62 IN

## 2024-07-26 DIAGNOSIS — D86.9 SARCOIDOSIS: Primary | ICD-10-CM

## 2024-07-26 DIAGNOSIS — E66.1 CLASS 2 DRUG-INDUCED OBESITY WITH SERIOUS COMORBIDITY AND BODY MASS INDEX (BMI) OF 39.0 TO 39.9 IN ADULT: Primary | ICD-10-CM

## 2024-07-26 DIAGNOSIS — R91.8 MULTIPLE PULMONARY NODULES: ICD-10-CM

## 2024-07-26 PROCEDURE — G8417 CALC BMI ABV UP PARAM F/U: HCPCS | Performed by: INTERNAL MEDICINE

## 2024-07-26 PROCEDURE — G8427 DOCREV CUR MEDS BY ELIG CLIN: HCPCS | Performed by: INTERNAL MEDICINE

## 2024-07-26 PROCEDURE — 1036F TOBACCO NON-USER: CPT | Performed by: INTERNAL MEDICINE

## 2024-07-26 PROCEDURE — 99214 OFFICE O/P EST MOD 30 MIN: CPT | Performed by: INTERNAL MEDICINE

## 2024-07-26 RX ORDER — SEMAGLUTIDE 0.25 MG/.5ML
0.25 INJECTION, SOLUTION SUBCUTANEOUS
Qty: 2 ML | Refills: 0 | Status: SHIPPED | OUTPATIENT
Start: 2024-07-26

## 2024-07-26 RX ORDER — PREDNISONE 5 MG/1
5 TABLET ORAL DAILY
Qty: 90 TABLET | Refills: 0 | Status: SHIPPED | OUTPATIENT
Start: 2024-07-26 | End: 2024-10-24

## 2024-07-26 NOTE — TELEPHONE ENCOUNTER
From: Payal Sanderson  To: Tammie Lock  Sent: 7/26/2024 11:37 AM EDT  Subject: Weight loss injection     I spoke with my insurance and they informed me that I'm able to get any of the three medications that we spoke about, but they need prior authorization. The representative gave me the number of that department which is [phone]. Please let me know if I need to do anything else. Thank you for your much valued time.

## 2024-08-29 ENCOUNTER — ANESTHESIA (OUTPATIENT)
Dept: OPERATING ROOM | Age: 44
End: 2024-08-29
Payer: COMMERCIAL

## 2024-08-29 ENCOUNTER — HOSPITAL ENCOUNTER (OUTPATIENT)
Age: 44
Setting detail: OUTPATIENT SURGERY
Discharge: HOME | End: 2024-08-29
Attending: INTERNAL MEDICINE | Admitting: INTERNAL MEDICINE
Payer: COMMERCIAL

## 2024-08-29 ENCOUNTER — ANESTHESIA EVENT (OUTPATIENT)
Dept: OPERATING ROOM | Age: 44
End: 2024-08-29
Payer: COMMERCIAL

## 2024-08-29 VITALS
HEART RATE: 56 BPM | RESPIRATION RATE: 16 BRPM | HEIGHT: 63 IN | DIASTOLIC BLOOD PRESSURE: 70 MMHG | WEIGHT: 212.7 LBS | TEMPERATURE: 97.2 F | OXYGEN SATURATION: 100 % | SYSTOLIC BLOOD PRESSURE: 144 MMHG | BODY MASS INDEX: 37.69 KG/M2

## 2024-08-29 DIAGNOSIS — Z12.11 SCREENING FOR COLON CANCER: ICD-10-CM

## 2024-08-29 DIAGNOSIS — Z80.0 FAMILY HX OF COLON CANCER: ICD-10-CM

## 2024-08-29 LAB — HCG, PREGNANCY URINE (POC): NEGATIVE

## 2024-08-29 PROCEDURE — 6360000002 HC RX W HCPCS: Performed by: NURSE ANESTHETIST, CERTIFIED REGISTERED

## 2024-08-29 PROCEDURE — 88342 IMHCHEM/IMCYTCHM 1ST ANTB: CPT

## 2024-08-29 PROCEDURE — 2709999900 HC NON-CHARGEABLE SUPPLY: Performed by: INTERNAL MEDICINE

## 2024-08-29 PROCEDURE — 7100000011 HC PHASE II RECOVERY - ADDTL 15 MIN: Performed by: INTERNAL MEDICINE

## 2024-08-29 PROCEDURE — 3700000001 HC ADD 15 MINUTES (ANESTHESIA): Performed by: INTERNAL MEDICINE

## 2024-08-29 PROCEDURE — 2500000003 HC RX 250 WO HCPCS: Performed by: NURSE ANESTHETIST, CERTIFIED REGISTERED

## 2024-08-29 PROCEDURE — 88341 IMHCHEM/IMCYTCHM EA ADD ANTB: CPT

## 2024-08-29 PROCEDURE — 45385 COLONOSCOPY W/LESION REMOVAL: CPT | Performed by: INTERNAL MEDICINE

## 2024-08-29 PROCEDURE — 2580000003 HC RX 258: Performed by: NURSE ANESTHETIST, CERTIFIED REGISTERED

## 2024-08-29 PROCEDURE — 3609010600 HC COLONOSCOPY POLYPECTOMY SNARE/COLD BIOPSY: Performed by: INTERNAL MEDICINE

## 2024-08-29 PROCEDURE — 45384 COLONOSCOPY W/LESION REMOVAL: CPT | Performed by: INTERNAL MEDICINE

## 2024-08-29 PROCEDURE — 88305 TISSUE EXAM BY PATHOLOGIST: CPT

## 2024-08-29 PROCEDURE — 7100000010 HC PHASE II RECOVERY - FIRST 15 MIN: Performed by: INTERNAL MEDICINE

## 2024-08-29 PROCEDURE — 3700000000 HC ANESTHESIA ATTENDED CARE: Performed by: INTERNAL MEDICINE

## 2024-08-29 PROCEDURE — 81025 URINE PREGNANCY TEST: CPT

## 2024-08-29 RX ORDER — SODIUM CHLORIDE, SODIUM LACTATE, POTASSIUM CHLORIDE, CALCIUM CHLORIDE 600; 310; 30; 20 MG/100ML; MG/100ML; MG/100ML; MG/100ML
INJECTION, SOLUTION INTRAVENOUS CONTINUOUS
Status: DISCONTINUED | OUTPATIENT
Start: 2024-08-29 | End: 2024-08-29 | Stop reason: HOSPADM

## 2024-08-29 RX ORDER — SODIUM CHLORIDE 9 MG/ML
INJECTION, SOLUTION INTRAVENOUS CONTINUOUS
Status: DISCONTINUED | OUTPATIENT
Start: 2024-08-29 | End: 2024-08-29 | Stop reason: HOSPADM

## 2024-08-29 RX ORDER — SODIUM CHLORIDE, SODIUM LACTATE, POTASSIUM CHLORIDE, CALCIUM CHLORIDE 600; 310; 30; 20 MG/100ML; MG/100ML; MG/100ML; MG/100ML
INJECTION, SOLUTION INTRAVENOUS CONTINUOUS PRN
Status: DISCONTINUED | OUTPATIENT
Start: 2024-08-29 | End: 2024-08-29 | Stop reason: SDUPTHER

## 2024-08-29 RX ORDER — PROPOFOL 10 MG/ML
INJECTION, EMULSION INTRAVENOUS PRN
Status: DISCONTINUED | OUTPATIENT
Start: 2024-08-29 | End: 2024-08-29 | Stop reason: SDUPTHER

## 2024-08-29 RX ORDER — SODIUM CHLORIDE 9 MG/ML
INJECTION, SOLUTION INTRAVENOUS PRN
Status: DISCONTINUED | OUTPATIENT
Start: 2024-08-29 | End: 2024-08-29 | Stop reason: HOSPADM

## 2024-08-29 RX ORDER — SODIUM CHLORIDE 0.9 % (FLUSH) 0.9 %
5-40 SYRINGE (ML) INJECTION PRN
Status: DISCONTINUED | OUTPATIENT
Start: 2024-08-29 | End: 2024-08-29 | Stop reason: HOSPADM

## 2024-08-29 RX ORDER — SODIUM CHLORIDE 0.9 % (FLUSH) 0.9 %
5-40 SYRINGE (ML) INJECTION EVERY 12 HOURS SCHEDULED
Status: DISCONTINUED | OUTPATIENT
Start: 2024-08-29 | End: 2024-08-29 | Stop reason: HOSPADM

## 2024-08-29 RX ORDER — LIDOCAINE HYDROCHLORIDE 20 MG/ML
INJECTION, SOLUTION INFILTRATION; PERINEURAL PRN
Status: DISCONTINUED | OUTPATIENT
Start: 2024-08-29 | End: 2024-08-29 | Stop reason: SDUPTHER

## 2024-08-29 RX ORDER — LIDOCAINE HYDROCHLORIDE 10 MG/ML
1 INJECTION, SOLUTION EPIDURAL; INFILTRATION; INTRACAUDAL; PERINEURAL
Status: DISCONTINUED | OUTPATIENT
Start: 2024-08-30 | End: 2024-08-29 | Stop reason: HOSPADM

## 2024-08-29 RX ADMIN — PROPOFOL 70 MG: 10 INJECTION, EMULSION INTRAVENOUS at 14:03

## 2024-08-29 RX ADMIN — SODIUM CHLORIDE, POTASSIUM CHLORIDE, SODIUM LACTATE AND CALCIUM CHLORIDE: 600; 310; 30; 20 INJECTION, SOLUTION INTRAVENOUS at 13:39

## 2024-08-29 RX ADMIN — PROPOFOL 100 MG: 10 INJECTION, EMULSION INTRAVENOUS at 13:44

## 2024-08-29 RX ADMIN — PROPOFOL 70 MG: 10 INJECTION, EMULSION INTRAVENOUS at 13:57

## 2024-08-29 RX ADMIN — PROPOFOL 70 MG: 10 INJECTION, EMULSION INTRAVENOUS at 13:50

## 2024-08-29 RX ADMIN — LIDOCAINE HYDROCHLORIDE 50 MG: 20 INJECTION, SOLUTION INFILTRATION; PERINEURAL at 13:44

## 2024-08-29 ASSESSMENT — PAIN - FUNCTIONAL ASSESSMENT
PAIN_FUNCTIONAL_ASSESSMENT: NONE - DENIES PAIN
PAIN_FUNCTIONAL_ASSESSMENT: 0-10

## 2024-08-29 ASSESSMENT — ENCOUNTER SYMPTOMS: SHORTNESS OF BREATH: 0

## 2024-08-29 NOTE — H&P
History and Physical Service   [place]    HISTORY AND PHYSICAL EXAMINATION            Date of Evaluation: 8/29/2024  Patient name:  Payal Sanderson  MRN:   [id number]  YOB: 1980  PCP:    Tammie Lock APRN - CNP    History Obtained From:     Patient, medical records    History of Present Illness:     This is Payal Sanderson a 44 y.o. female who presents today for a COLORECTAL CANCER SCREENING, NOT HIGH RISK by Kee Millan MD for Screening for colon cancer; Family hx of colon cancer. Patient denies bowel changes. She denies bloody tarry stools, diarrhea alternating with constipation, nausea, vomiting, abdominal pain or unintentional weight loss. Patient followed bowel prep until watery clear.  No previous colonoscopy or EGD. FH colon cancer in father. Denies fever, chills, shortness of breath, cough, congestion, wheezing, chest pain, open sores or wounds. Denies hx of diabetes. Last aspirin 08/27/2024.    Past Medical History:     Past Medical History:   Diagnosis Date    Brain aneurysm     caused CVA    Carotid aneurysm, left (HCC)     History of ischemic middle cerebral artery stroke     Hypertension     Lung nodule     Mediastinal adenopathy     Migraines     Obesity     Sarcoidosis     Stroke (HCC)     1/25/2020 stroke        Past Surgical History:     Past Surgical History:   Procedure Laterality Date    BRONCHOSCOPY  06/29/2023    BRONCHOSCOPY ENDOBRONCHIAL ULTRASOUND WITH PATHOLOGY, TRANSBRONCHIAL NEEDLE ASPIRATION (Right)    BRONCHOSCOPY Right 6/29/2023    BRONCHOSCOPY ENDOBRONCHIAL ULTRASOUND WITH PATHOLOGY, TRANSBRONCHIAL NEEDLE ASPIRATION performed by Luis Alfredo Garces MD at [facility] OR    BRONCHOSCOPY  6/29/2023    BRONCHOSCOPY ALVEOLAR LAVAGE performed by Luis Alfredo Garces MD at [facility] OR    BRONCHOSCOPY  6/29/2023    BRONCHOSCOPY BIOPSY BRONCHUS performed by Luis Alfredo Garces MD at [facility] OR    CARDIAC CATHETERIZATION  01/25/2021    IR ANGIOGRAM CAROTID CEREBRAL  BILATERAL BRANDY SURPASS EVOLVE ANEURYSM EMBOLIZATION SYSTEM MRI CONDITIONAL 3T OK. SAFE IMMEDIATELY POST IMPLANT.    CARDIAC CATHETERIZATION      IR ANGIOGRAM CAROTID CEREBRAL BILATERAL    INTRAUTERINE DEVICE INSERTION  03/09/2021    Mirena IUD NDC 25147-974-68 Lot [id number] Exp May 2023    OPEN REPAIR PERIPHERAL ANEURYSM      brain aneurysm repair    OTHER SURGICAL HISTORY  05/18/2021    cerebral angiogram        Medications Prior to Admission:     Prior to Admission medications    Medication Sig Start Date End Date Taking? Authorizing Provider   Atogepant (QULIPTA) 60 MG TABS Take 60 mg by mouth daily  Patient taking differently: Take 60 mg by mouth as needed 2/5/24  Yes Mandy Garrido MD   Ubrogepant 100 MG TABS Take one at onset of migraine. May repeat in 2 hours.  No more than 2 in 24 hours and 4 in 1 week  Patient taking differently: as needed Take one at onset of migraine. May repeat in 2 hours.  No more than 2 in 24 hours and 4 in 1 week 2/5/24  Yes Mandy Garrido MD   predniSONE (DELTASONE) 5 MG tablet Take 1 tablet by mouth daily 7/26/24 10/24/24  Luis Alfredo Garces MD   Semaglutide-Weight Management (WEGOVY) 0.25 MG/0.5ML SOAJ SC injection Inject 0.25 mg into the skin every 7 days  Patient not taking: Reported on 8/29/2024 7/26/24   Tammie Lock APRN - CNP   propranolol (INDERAL LA) 60 MG extended release capsule Take 1 capsule by mouth daily 6/3/24 5/29/25  Mandy Garrido MD   amitriptyline (ELAVIL) 50 MG tablet Take 2 tablets by mouth nightly 6/3/24   Mandy Garrido MD   amLODIPine (NORVASC) 5 MG tablet take 1 tablet by mouth once daily 4/25/24   Tammie Lock APRN - CNP   ASPIRIN LOW DOSE 81 MG EC tablet take 1 tablet by mouth once daily 1/8/24   Manuel Montejo MD   atorvastatin (LIPITOR) 10 MG tablet Take 1 tablet by mouth daily 12/12/23   Tammie Lock APRN - CNP        Allergies:     Amoxicillin and Penicillins    Social History:     Tobacco:    reports that she has never smoked. She has never

## 2024-08-29 NOTE — OP NOTE
Operative Note      Patient: Payal Sanderson  YOB: 1980  MRN: [id number]    Date of Procedure: 8/29/2024    Pre-Op Diagnosis Codes:      * Screening for colon cancer [Z12.11]     * Family hx of colon cancer [Z80.0]    Post-Op Diagnosis:  Transverse, sigmoid colon polyps, internal hemorrhoids.       Procedure(s):  COLORECTAL CANCER SCREENING with polypectomy    Surgeon(s):  Kee Millan MD    Assistant:   * No surgical staff found *    Anesthesia: Monitor Anesthesia Care    Estimated Blood Loss (mL): Minimal    Complications: None    Specimens:   ID Type Source Tests Collected by Time Destination   A : transverce colon polyp Tissue Colon-Transverse SURGICAL PATHOLOGY Kee Millan MD 8/29/2024 1353    B : sigmoid colon polyp Tissue Sigmoid Colon SURGICAL PATHOLOGY Kee Millan MD 8/29/2024 1358        Implants:  * No implants in log *      Drains: * No LDAs found *    Findings:  Infection Present At Time Of Surgery (PATOS) (choose all levels that have infection present):  No infection present      Scope withdrawal time:17 min     Description of Procedure:  Informed consent was obtained from the patient after explanation of the procedure including indications, description of the procedure,  benefits and possible risks and complications of the procedure, and alternatives. Questions were answered.  The patient's history was reviewed and a directed physical examination was performed prior to the procedure.    Patient was monitored throughout the procedure with pulse oximetry and periodic assessment of vital signs. Patient was sedated as noted above. With the patient initially in the left lateral decubitus position, a digital rectal examination was performed and revealed negative without mass, lesions or tenderness.  The Olympus video colonoscope was placed in the patient's rectum and advanced without difficulty  to the cecum, which was identified by the ileocecal valve and  appendiceal orifice.  The prep was good.  Examination of the mucosa was performed during both introduction and withdrawal of the colonoscope. Retroflexed view of the rectum was performed.  The patient  was taken to the recovery area in good condition.     Findings:     1.  8 mm sessile polyp was seen in the transverse colon.  Cold snare polypectomy was done.  Resection retrieval was complete.  2.  2 sessile polyps were seen in the sigmoid colon.  Polyps ranging in size from 6 to 5 mm.  Cold snare and cold forcep polypectomies were done.  Resection retrieval was complete.  3.  Internal hemorrhoids were seen during regular colonoscopy     Recommendations: Follow-up with the biopsy results                                     Repeat colonoscopy in 5 years                                     Follow-up with PCP    Kee Millan MD    Electronically signed by Kee Millan MD on 8/29/2024 at 2:08 PM

## 2024-08-29 NOTE — DISCHARGE INSTRUCTIONS
[facility]    POST-ENDOSCOPY INSTRUCTIONS    1. ACTIVITY   No driving, operating machinery, or making important decisions for 24 hours.    Resume normal activity after 24 hours.  You may return to work after 24 hours.    2. DIET        Resume your usual diet unless specified below.   ***    3. MEDICATIONS    Resume your usual medications.     Do not consume alcohol, tranquilizers, or sleeping medications for 24 hour unless advised by your physician.                 4. PHYSICIAN FOLLOW-UP / INSTRUCTIONS    Please call the office/clinic in 10 days for biopsy results:      [  ] GI office:  [phone]          Follow up with your family physician as planned.    6. NORMAL CHANGES YOU MAY EXPERIENCE AFTER ENDOSCOPY:        COLONOSCOPY   Passing of gas for several hours.     Some mild abdominal cramping.                You may feel fatigued for the next 24-48   hours due to the prep and sedation    7. CALL YOUR PHYSICIAN IF YOU EXPERIENCE ANY OF THE FOLLOWING      A.  Passing blood rectally or vomiting blood (color may be red or black)      B.  Severe abdominal pain or tenderness (that is not relieved by passing air)      C.  Fever, chills, or excessive sweating      D.  Persistent nausea or vomiting      E.  Redness or swelling at the IV site    If you have additional questions, PLEASE call your doctor or the [facility] GI Unit ([phone])

## 2024-08-29 NOTE — ANESTHESIA PRE PROCEDURE
Department of Anesthesiology  Preprocedure Note       Name:  Payal Sanderson   Age:  44 y.o.  :  1980                                          MRN:  [id number]         Date:  2024      Surgeon: Surgeon(s):  Kee Millan MD    Procedure: Procedure(s):  COLORECTAL CANCER SCREENING, NOT HIGH RISK    Medications prior to admission:   Prior to Admission medications    Medication Sig Start Date End Date Taking? Authorizing Provider   predniSONE (DELTASONE) 5 MG tablet Take 1 tablet by mouth daily 7/26/24 10/24/24  Luis Alfredo Garces MD   Semaglutide-Weight Management (WEGOVY) 0.25 MG/0.5ML SOAJ SC injection Inject 0.25 mg into the skin every 7 days 24   Tammie Lock APRN - CNP   propranolol (INDERAL LA) 60 MG extended release capsule Take 1 capsule by mouth daily 6/3/24 5/29/25  Mandy Garrido MD   amitriptyline (ELAVIL) 50 MG tablet Take 2 tablets by mouth nightly 6/3/24   Mandy Garrido MD   amLODIPine (NORVASC) 5 MG tablet take 1 tablet by mouth once daily 24   Tammie Lock APRN - CNP   Atogepant (QULIPTA) 60 MG TABS Take 60 mg by mouth daily  Patient taking differently: Take 60 mg by mouth as needed 24   Mandy Garrido MD   Ubrogepant 100 MG TABS Take one at onset of migraine. May repeat in 2 hours.  No more than 2 in 24 hours and 4 in 1 week  Patient taking differently: as needed Take one at onset of migraine. May repeat in 2 hours.  No more than 2 in 24 hours and 4 in 1 week 24   Mandy Garrido MD   ASPIRIN LOW DOSE 81 MG EC tablet take 1 tablet by mouth once daily 24   Manuel Montejo MD   atorvastatin (LIPITOR) 10 MG tablet Take 1 tablet by mouth daily 23   Tammie Lock APRN - CNP       Current medications:    No current facility-administered medications for this encounter.       Allergies:    Allergies   Allergen Reactions   • Amoxicillin Swelling     lips   • Penicillins Other (See Comments)     Pt. Unsure of reaction       Problem List:    Patient Active 
decreased ability to use legs for bridging/pushing/impaired ability to control trunk for mobility

## 2024-08-29 NOTE — ANESTHESIA POSTPROCEDURE EVALUATION
Department of Anesthesiology  Postprocedure Note    Patient: Payal Sanderson  MRN: [id number]  YOB: 1980  Date of evaluation: 8/29/2024    Procedure Summary       Date: 08/29/24 Room / Location: [address]    Anesthesia Start: 1339 Anesthesia Stop: 1413    Procedure: COLORECTAL CANCER SCREENING with polypectomy Diagnosis:       Screening for colon cancer      Family hx of colon cancer      (Screening for colon cancer [Z12.11])      (Family hx of colon cancer [Z80.0])    Surgeons: Kee Millan MD Responsible Provider: Temo Gray MD    Anesthesia Type: MAC ASA Status: 3            Anesthesia Type: No value filed.    Nahomi Phase I: Nahomi Score: 10    Nahomi Phase II: Nahomi Score: 9    Anesthesia Post Evaluation    Airway patency: patent  Cardiovascular status: hemodynamically stable  Respiratory status: acceptable    No notable events documented.  
4 = No assist / stand by assistance

## 2024-09-05 LAB — SURGICAL PATHOLOGY REPORT: NORMAL

## 2024-09-23 ENCOUNTER — OFFICE VISIT (OUTPATIENT)
Dept: NEUROLOGY | Age: 44
End: 2024-09-23
Payer: COMMERCIAL

## 2024-09-23 VITALS
HEIGHT: 62 IN | WEIGHT: 223 LBS | BODY MASS INDEX: 41.04 KG/M2 | DIASTOLIC BLOOD PRESSURE: 87 MMHG | SYSTOLIC BLOOD PRESSURE: 138 MMHG

## 2024-09-23 DIAGNOSIS — Z86.79 S/P CEREBRAL ANEURYSM REPAIR: ICD-10-CM

## 2024-09-23 DIAGNOSIS — Z98.890 S/P CEREBRAL ANEURYSM REPAIR: ICD-10-CM

## 2024-09-23 DIAGNOSIS — Z86.73 H/O: CVA (CEREBROVASCULAR ACCIDENT): ICD-10-CM

## 2024-09-23 DIAGNOSIS — G43.019 INTRACTABLE MIGRAINE WITHOUT AURA AND WITHOUT STATUS MIGRAINOSUS: ICD-10-CM

## 2024-09-23 DIAGNOSIS — G43.809 OTHER MIGRAINE WITHOUT STATUS MIGRAINOSUS, NOT INTRACTABLE: Primary | ICD-10-CM

## 2024-09-23 PROCEDURE — 1036F TOBACCO NON-USER: CPT | Performed by: PSYCHIATRY & NEUROLOGY

## 2024-09-23 PROCEDURE — G8417 CALC BMI ABV UP PARAM F/U: HCPCS | Performed by: PSYCHIATRY & NEUROLOGY

## 2024-09-23 PROCEDURE — 99215 OFFICE O/P EST HI 40 MIN: CPT | Performed by: PSYCHIATRY & NEUROLOGY

## 2024-09-23 PROCEDURE — G8427 DOCREV CUR MEDS BY ELIG CLIN: HCPCS | Performed by: PSYCHIATRY & NEUROLOGY

## 2024-09-23 RX ORDER — RIMEGEPANT SULFATE 75 MG/75MG
75 TABLET, ORALLY DISINTEGRATING ORAL PRN
Qty: 4 TABLET | Refills: 0 | Status: SHIPPED | COMMUNITY
Start: 2024-09-23 | End: 2024-10-23

## 2024-09-23 RX ORDER — RIMEGEPANT SULFATE 75 MG/75MG
75 TABLET, ORALLY DISINTEGRATING ORAL EVERY OTHER DAY
Qty: 16 TABLET | Refills: 11 | Status: SHIPPED | OUTPATIENT
Start: 2024-09-23 | End: 2024-10-23

## 2024-10-07 ENCOUNTER — HOSPITAL ENCOUNTER (OUTPATIENT)
Age: 44
Setting detail: SPECIMEN
Discharge: HOME | End: 2024-10-07

## 2024-10-07 DIAGNOSIS — Z79.52 LONG TERM SYSTEMIC STEROID USER: ICD-10-CM

## 2024-10-07 DIAGNOSIS — D86.9 SARCOIDOSIS: ICD-10-CM

## 2024-10-07 DIAGNOSIS — E66.813 CLASS 3 DRUG-INDUCED OBESITY WITH SERIOUS COMORBIDITY AND BODY MASS INDEX (BMI) OF 40.0 TO 44.9 IN ADULT: ICD-10-CM

## 2024-10-07 DIAGNOSIS — Z00.00 ROUTINE PHYSICAL EXAMINATION: ICD-10-CM

## 2024-10-07 DIAGNOSIS — E66.812 CLASS 2 DRUG-INDUCED OBESITY WITH SERIOUS COMORBIDITY AND BODY MASS INDEX (BMI) OF 39.0 TO 39.9 IN ADULT: ICD-10-CM

## 2024-10-07 DIAGNOSIS — E66.1 CLASS 3 DRUG-INDUCED OBESITY WITH SERIOUS COMORBIDITY AND BODY MASS INDEX (BMI) OF 40.0 TO 44.9 IN ADULT: ICD-10-CM

## 2024-10-07 DIAGNOSIS — E66.1 CLASS 2 DRUG-INDUCED OBESITY WITH SERIOUS COMORBIDITY AND BODY MASS INDEX (BMI) OF 39.0 TO 39.9 IN ADULT: ICD-10-CM

## 2024-10-07 LAB
ALBUMIN SERPL-MCNC: 3.9 G/DL (ref 3.5–5.2)
ALBUMIN/GLOB SERPL: 1 {RATIO} (ref 1–2.5)
ALP SERPL-CCNC: 54 U/L (ref 35–104)
ALT SERPL-CCNC: 11 U/L (ref 10–35)
ANION GAP SERPL CALCULATED.3IONS-SCNC: 9 MMOL/L (ref 9–16)
AST SERPL-CCNC: 14 U/L (ref 10–35)
BASOPHILS # BLD: 0.03 K/UL (ref 0–0.2)
BASOPHILS NFR BLD: 0 % (ref 0–2)
BILIRUB SERPL-MCNC: 0.8 MG/DL (ref 0–1.2)
BUN SERPL-MCNC: 8 MG/DL (ref 6–20)
CALCIUM SERPL-MCNC: 8.7 MG/DL (ref 8.6–10.4)
CHLORIDE SERPL-SCNC: 106 MMOL/L (ref 98–107)
CHOLEST SERPL-MCNC: 184 MG/DL (ref 0–199)
CHOLESTEROL/HDL RATIO: 3
CO2 SERPL-SCNC: 24 MMOL/L (ref 20–31)
CREAT SERPL-MCNC: 0.8 MG/DL (ref 0.5–0.9)
EOSINOPHIL # BLD: 0.48 K/UL (ref 0–0.44)
EOSINOPHILS RELATIVE PERCENT: 6 % (ref 1–4)
ERYTHROCYTE [DISTWIDTH] IN BLOOD BY AUTOMATED COUNT: 12.8 % (ref 11.8–14.4)
EST. AVERAGE GLUCOSE BLD GHB EST-MCNC: 114 MG/DL
GFR, ESTIMATED: 89 ML/MIN/1.73M2
GLUCOSE SERPL-MCNC: 73 MG/DL (ref 74–99)
HBA1C MFR BLD: 5.6 % (ref 4–6)
HCT VFR BLD AUTO: 42.3 % (ref 36.3–47.1)
HDLC SERPL-MCNC: 59 MG/DL
HGB BLD-MCNC: 13.7 G/DL (ref 11.9–15.1)
IMM GRANULOCYTES # BLD AUTO: 0.03 K/UL (ref 0–0.3)
IMM GRANULOCYTES NFR BLD: 0 %
LDLC SERPL CALC-MCNC: 108 MG/DL (ref 0–100)
LYMPHOCYTES NFR BLD: 2.96 K/UL (ref 1.1–3.7)
LYMPHOCYTES RELATIVE PERCENT: 37 % (ref 24–43)
MAGNESIUM SERPL-MCNC: 2.1 MG/DL (ref 1.6–2.6)
MCH RBC QN AUTO: 30.2 PG (ref 25.2–33.5)
MCHC RBC AUTO-ENTMCNC: 32.4 G/DL (ref 28.4–34.8)
MCV RBC AUTO: 93.2 FL (ref 82.6–102.9)
MONOCYTES NFR BLD: 0.84 K/UL (ref 0.1–1.2)
MONOCYTES NFR BLD: 11 % (ref 3–12)
NEUTROPHILS NFR BLD: 46 % (ref 36–65)
NEUTS SEG NFR BLD: 3.6 K/UL (ref 1.5–8.1)
NRBC BLD-RTO: 0 PER 100 WBC
PLATELET # BLD AUTO: 357 K/UL (ref 138–453)
PMV BLD AUTO: 10.6 FL (ref 8.1–13.5)
POTASSIUM SERPL-SCNC: 3.8 MMOL/L (ref 3.7–5.3)
PROT SERPL-MCNC: 7.2 G/DL (ref 6.6–8.7)
RBC # BLD AUTO: 4.54 M/UL (ref 3.95–5.11)
SODIUM SERPL-SCNC: 139 MMOL/L (ref 136–145)
TRIGL SERPL-MCNC: 85 MG/DL (ref 0–149)
TSH SERPL DL<=0.05 MIU/L-ACNC: 2.63 UIU/ML (ref 0.27–4.2)
VLDLC SERPL CALC-MCNC: 17 MG/DL
WBC OTHER # BLD: 7.9 K/UL (ref 3.5–11.3)

## 2024-10-07 RX ORDER — SEMAGLUTIDE 0.25 MG/.5ML
0.25 INJECTION, SOLUTION SUBCUTANEOUS
Qty: 2 ML | Refills: 0 | OUTPATIENT
Start: 2024-10-07

## 2024-10-07 RX ORDER — SEMAGLUTIDE 0.5 MG/.5ML
0.5 INJECTION, SOLUTION SUBCUTANEOUS
Qty: 2 ML | Refills: 0 | Status: SHIPPED | OUTPATIENT
Start: 2024-10-07 | End: 2024-11-06

## 2024-10-07 NOTE — TELEPHONE ENCOUNTER
Payla Sanderson is calling to request a refill on the following medication(s):    Medication Request:  Requested Prescriptions     Pending Prescriptions Disp Refills    Semaglutide-Weight Management (WEGOVY) 0.25 MG/0.5ML SOAJ SC injection 2 mL 0     Sig: Inject 0.25 mg into the skin every 7 days       Last Visit Date (If Applicable):  6/14/2024    Next Visit Date:    12/13/2024

## 2024-10-08 DIAGNOSIS — Z86.73 HISTORY OF CVA (CEREBROVASCULAR ACCIDENT): ICD-10-CM

## 2024-10-08 DIAGNOSIS — E78.5 DYSLIPIDEMIA: ICD-10-CM

## 2024-10-08 RX ORDER — ATORVASTATIN CALCIUM 20 MG/1
20 TABLET, FILM COATED ORAL DAILY
Qty: 90 TABLET | Refills: 1 | Status: SHIPPED | OUTPATIENT
Start: 2024-10-08

## 2024-11-05 DIAGNOSIS — Z86.73 HISTORY OF CVA (CEREBROVASCULAR ACCIDENT): ICD-10-CM

## 2024-11-05 DIAGNOSIS — E78.5 DYSLIPIDEMIA: ICD-10-CM

## 2024-11-05 RX ORDER — ATORVASTATIN CALCIUM 20 MG/1
20 TABLET, FILM COATED ORAL DAILY
Qty: 90 TABLET | Refills: 2 | Status: SHIPPED | OUTPATIENT
Start: 2024-11-05

## 2024-11-05 NOTE — TELEPHONE ENCOUNTER
Patient medication was sent to the wrong pharmacy   Asking [facility] to be [facility] to [facility]

## 2024-11-22 ENCOUNTER — OFFICE VISIT (OUTPATIENT)
Dept: PULMONOLOGY | Age: 44
End: 2024-11-22
Payer: COMMERCIAL

## 2024-11-22 VITALS
BODY MASS INDEX: 41 KG/M2 | HEIGHT: 62 IN | SYSTOLIC BLOOD PRESSURE: 149 MMHG | DIASTOLIC BLOOD PRESSURE: 97 MMHG | WEIGHT: 222.8 LBS | HEART RATE: 59 BPM | OXYGEN SATURATION: 99 % | RESPIRATION RATE: 18 BRPM

## 2024-11-22 DIAGNOSIS — D72.10 EOSINOPHILIA, UNSPECIFIED TYPE: ICD-10-CM

## 2024-11-22 DIAGNOSIS — D86.0 PULMONARY SARCOIDOSIS (HCC): Primary | ICD-10-CM

## 2024-11-22 PROCEDURE — G8427 DOCREV CUR MEDS BY ELIG CLIN: HCPCS | Performed by: INTERNAL MEDICINE

## 2024-11-22 PROCEDURE — 1036F TOBACCO NON-USER: CPT | Performed by: INTERNAL MEDICINE

## 2024-11-22 PROCEDURE — G8417 CALC BMI ABV UP PARAM F/U: HCPCS | Performed by: INTERNAL MEDICINE

## 2024-11-22 PROCEDURE — 99214 OFFICE O/P EST MOD 30 MIN: CPT | Performed by: INTERNAL MEDICINE

## 2024-11-22 PROCEDURE — G8484 FLU IMMUNIZE NO ADMIN: HCPCS | Performed by: INTERNAL MEDICINE

## 2024-11-22 RX ORDER — RIMEGEPANT SULFATE 75 MG/75MG
TABLET, ORALLY DISINTEGRATING ORAL
COMMUNITY

## 2024-12-13 ENCOUNTER — OFFICE VISIT (OUTPATIENT)
Dept: FAMILY MEDICINE CLINIC | Age: 44
End: 2024-12-13
Payer: COMMERCIAL

## 2024-12-13 VITALS
WEIGHT: 222.4 LBS | DIASTOLIC BLOOD PRESSURE: 96 MMHG | SYSTOLIC BLOOD PRESSURE: 149 MMHG | HEIGHT: 62 IN | OXYGEN SATURATION: 98 % | TEMPERATURE: 97.7 F | HEART RATE: 67 BPM | BODY MASS INDEX: 40.93 KG/M2

## 2024-12-13 DIAGNOSIS — E66.813 CLASS 3 SEVERE OBESITY DUE TO EXCESS CALORIES WITH SERIOUS COMORBIDITY AND BODY MASS INDEX (BMI) OF 40.0 TO 44.9 IN ADULT: ICD-10-CM

## 2024-12-13 DIAGNOSIS — E66.01 CLASS 3 SEVERE OBESITY DUE TO EXCESS CALORIES WITH SERIOUS COMORBIDITY AND BODY MASS INDEX (BMI) OF 40.0 TO 44.9 IN ADULT: ICD-10-CM

## 2024-12-13 DIAGNOSIS — R73.03 PRE-DIABETES: ICD-10-CM

## 2024-12-13 DIAGNOSIS — I10 ESSENTIAL HYPERTENSION: Primary | ICD-10-CM

## 2024-12-13 DIAGNOSIS — G43.909 MIGRAINE WITHOUT STATUS MIGRAINOSUS, NOT INTRACTABLE, UNSPECIFIED MIGRAINE TYPE: ICD-10-CM

## 2024-12-13 DIAGNOSIS — E78.2 MIXED HYPERLIPIDEMIA: ICD-10-CM

## 2024-12-13 PROCEDURE — 99214 OFFICE O/P EST MOD 30 MIN: CPT | Performed by: NURSE PRACTITIONER

## 2024-12-13 PROCEDURE — 1036F TOBACCO NON-USER: CPT | Performed by: NURSE PRACTITIONER

## 2024-12-13 PROCEDURE — G8427 DOCREV CUR MEDS BY ELIG CLIN: HCPCS | Performed by: NURSE PRACTITIONER

## 2024-12-13 PROCEDURE — G8417 CALC BMI ABV UP PARAM F/U: HCPCS | Performed by: NURSE PRACTITIONER

## 2024-12-13 PROCEDURE — 3075F SYST BP GE 130 - 139MM HG: CPT | Performed by: NURSE PRACTITIONER

## 2024-12-13 PROCEDURE — 3079F DIAST BP 80-89 MM HG: CPT | Performed by: NURSE PRACTITIONER

## 2024-12-13 PROCEDURE — G8484 FLU IMMUNIZE NO ADMIN: HCPCS | Performed by: NURSE PRACTITIONER

## 2024-12-13 RX ORDER — AMLODIPINE BESYLATE 10 MG/1
10 TABLET ORAL DAILY
Qty: 90 TABLET | Refills: 1 | Status: SHIPPED | OUTPATIENT
Start: 2024-12-13

## 2024-12-13 SDOH — ECONOMIC STABILITY: INCOME INSECURITY: HOW HARD IS IT FOR YOU TO PAY FOR THE VERY BASICS LIKE FOOD, HOUSING, MEDICAL CARE, AND HEATING?: NOT HARD AT ALL

## 2024-12-13 SDOH — ECONOMIC STABILITY: FOOD INSECURITY: WITHIN THE PAST 12 MONTHS, THE FOOD YOU BOUGHT JUST DIDN'T LAST AND YOU DIDN'T HAVE MONEY TO GET MORE.: NEVER TRUE

## 2024-12-13 SDOH — ECONOMIC STABILITY: FOOD INSECURITY: WITHIN THE PAST 12 MONTHS, YOU WORRIED THAT YOUR FOOD WOULD RUN OUT BEFORE YOU GOT MONEY TO BUY MORE.: NEVER TRUE

## 2024-12-13 ASSESSMENT — ENCOUNTER SYMPTOMS
SHORTNESS OF BREATH: 0
VOMITING: 1
CHEST TIGHTNESS: 0
COLOR CHANGE: 0
EYES NEGATIVE: 1
ALLERGIC/IMMUNOLOGIC NEGATIVE: 1
NAUSEA: 1
COUGH: 1

## 2024-12-13 NOTE — PROGRESS NOTES
cause of headache sx ?  Increase dose as noted above  Encouraged home BP monitoring & Lifestyle modifications  F/u 4-6 weeks or sooner PRN   Bring home log to f/u visit     2. Pre-diabetes    Encouraged healthy diet, regular exercise & weight loss     3. Mixed hyperlipidemia    Stable, continue statin, labs reviewed     4. Migraine without status migrainosus, not intractable, unspecified migraine type    Following with Neurology, I encouraged touch base if headaches do not improve & BP stabilizes     5. Class 3 severe obesity due to excess calories with serious comorbidity and body mass index (BMI) of 40.0 to 44.9 in adult    OK to continue Wegovy if able to tolerate   F/u 6 weeks or sooner PRN   We discussed if she needs to continue on 0.5 dose for longer before stepping up to communicate that with us     Discussed use, benefit, and side effects of prescribed medications.All patient questions answered.  Pt voiced understanding. Reviewed health maintenance.Instructed to continue current medications, diet and exercise.  Patient agreedwith treatment plan. Follow up as directed.     Electronically signed by RUDOLPH Ryan CNP on 12/13/2024

## 2024-12-19 PROBLEM — D72.10 EOSINOPHILIA: Status: ACTIVE | Noted: 2024-12-19

## 2024-12-19 PROBLEM — D86.0 PULMONARY SARCOIDOSIS (HCC): Status: ACTIVE | Noted: 2023-12-08

## 2024-12-19 PROBLEM — R91.8 MULTIPLE PULMONARY NODULES: Status: RESOLVED | Noted: 2023-06-16 | Resolved: 2024-12-19

## 2024-12-19 PROBLEM — R59.0 HILAR LYMPHADENOPATHY: Status: RESOLVED | Noted: 2023-06-16 | Resolved: 2024-12-19

## 2024-12-19 NOTE — PROGRESS NOTES
PULMONARY MEDICINE OUTPATIENT NOTE     PATIENT:Payal Sanderson  YOB: 1980  MRN:[id number]     REFERRED BY:Tammie Lock, RUDOLPH - CNP      HISTORY     Payal Sanderson is a 44 y.o. years old female, here for established patient evaluation.     INITIAL VISIT:  LAST VISIT: 7/26/2024  DATE OF VISIT: 11/22/2024    CHIEF COMPLIANT:Sarcoidosis (3 month follow up with labs )    EVALUATION/MANAGEMENT DETAILS FROM PREVIOUS VISITS/ENCOUNTERS:    Patient is a non-smoker, however previously reported daily marijuana use. She was initially seen in the clinic after ER visit with chest pain and difficulty breathing. Patient was initially started on 20 mg of prednisone daily (July 2023).  She reported improvement in her in the dose of prednisone was dropped to 15 mg daily about 3 months back. Patient reports she is currently asymptomatic and is on 10mg prednisone.     PFT 6/23/2023) showed normal FEV1/FVC ratio, normal TLC and RV, diffusion capacity was increased  PFT (2/1/2024) shows normal FEV1/FVC ratio, normal TLC, mildly reduced RV, normal diffusion capacity     CT chest (6/15/2023) was reported to show bilateral mediastinal and hilar adenopathy, along with multiple bilateral pulmonary nodules, the largest nodule is 9 mm in the right lower lobe along with mild interstitial thickening/atelectasis at the bilateral lung bases.   CT chest (1/23/2024) showed improvement in pulmonary nodules and lymphadenopathy.      Bronchoscopy EBUS/TBNA (6/29/2023) of station 7 and 10R stations, along with right middle lobe BAL.  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  AFB and fungal stains are negative.       She also had history of cerebral aneurysm for which she underwent coiling in January 2021.  The procedure complicated by development of left MCA stroke and patient required mechanical thrombectomy due to left ICA

## 2024-12-23 DIAGNOSIS — E66.01 CLASS 3 SEVERE OBESITY DUE TO EXCESS CALORIES WITH SERIOUS COMORBIDITY AND BODY MASS INDEX (BMI) OF 40.0 TO 44.9 IN ADULT: Primary | ICD-10-CM

## 2024-12-23 DIAGNOSIS — E66.813 CLASS 3 SEVERE OBESITY DUE TO EXCESS CALORIES WITH SERIOUS COMORBIDITY AND BODY MASS INDEX (BMI) OF 40.0 TO 44.9 IN ADULT: Primary | ICD-10-CM

## 2024-12-23 RX ORDER — SEMAGLUTIDE 0.5 MG/.5ML
0.5 INJECTION, SOLUTION SUBCUTANEOUS
Qty: 2 ML | Refills: 0 | OUTPATIENT
Start: 2024-12-23 | End: 2025-01-22

## 2024-12-23 RX ORDER — SEMAGLUTIDE 1 MG/.5ML
1 INJECTION, SOLUTION SUBCUTANEOUS
Qty: 2 ML | Refills: 2 | Status: SHIPPED | OUTPATIENT
Start: 2024-12-23

## 2024-12-23 NOTE — TELEPHONE ENCOUNTER
After receiving a refill request for the wegovy 0.5, reached out to the patient to see if she was wanting to stay at .05 or increase the dosage. Per Tammie's last note, she stated for the patient to let us know if she would like to stay at .05 longer or increase the dosage. The patient states that she would like to increase the dosage of the wegovy if possible.

## 2025-01-02 ENCOUNTER — TELEPHONE (OUTPATIENT)
Dept: NEUROLOGY | Age: 45
End: 2025-01-02

## 2025-01-17 ENCOUNTER — OFFICE VISIT (OUTPATIENT)
Dept: FAMILY MEDICINE CLINIC | Age: 45
End: 2025-01-17
Payer: COMMERCIAL

## 2025-01-17 VITALS
HEART RATE: 71 BPM | SYSTOLIC BLOOD PRESSURE: 110 MMHG | TEMPERATURE: 97.6 F | WEIGHT: 214 LBS | OXYGEN SATURATION: 97 % | BODY MASS INDEX: 39.14 KG/M2 | DIASTOLIC BLOOD PRESSURE: 80 MMHG

## 2025-01-17 DIAGNOSIS — E66.01 CLASS 3 SEVERE OBESITY DUE TO EXCESS CALORIES WITH SERIOUS COMORBIDITY AND BODY MASS INDEX (BMI) OF 40.0 TO 44.9 IN ADULT: ICD-10-CM

## 2025-01-17 DIAGNOSIS — I10 ESSENTIAL HYPERTENSION: Primary | ICD-10-CM

## 2025-01-17 DIAGNOSIS — E66.813 CLASS 3 SEVERE OBESITY DUE TO EXCESS CALORIES WITH SERIOUS COMORBIDITY AND BODY MASS INDEX (BMI) OF 40.0 TO 44.9 IN ADULT: ICD-10-CM

## 2025-01-17 DIAGNOSIS — G43.909 MIGRAINE WITHOUT STATUS MIGRAINOSUS, NOT INTRACTABLE, UNSPECIFIED MIGRAINE TYPE: ICD-10-CM

## 2025-01-17 PROCEDURE — 3074F SYST BP LT 130 MM HG: CPT | Performed by: NURSE PRACTITIONER

## 2025-01-17 PROCEDURE — G8417 CALC BMI ABV UP PARAM F/U: HCPCS | Performed by: NURSE PRACTITIONER

## 2025-01-17 PROCEDURE — G8427 DOCREV CUR MEDS BY ELIG CLIN: HCPCS | Performed by: NURSE PRACTITIONER

## 2025-01-17 PROCEDURE — 1036F TOBACCO NON-USER: CPT | Performed by: NURSE PRACTITIONER

## 2025-01-17 PROCEDURE — 3079F DIAST BP 80-89 MM HG: CPT | Performed by: NURSE PRACTITIONER

## 2025-01-17 PROCEDURE — 99214 OFFICE O/P EST MOD 30 MIN: CPT | Performed by: NURSE PRACTITIONER

## 2025-01-17 SDOH — ECONOMIC STABILITY: FOOD INSECURITY: WITHIN THE PAST 12 MONTHS, THE FOOD YOU BOUGHT JUST DIDN'T LAST AND YOU DIDN'T HAVE MONEY TO GET MORE.: NEVER TRUE

## 2025-01-17 SDOH — ECONOMIC STABILITY: FOOD INSECURITY: WITHIN THE PAST 12 MONTHS, YOU WORRIED THAT YOUR FOOD WOULD RUN OUT BEFORE YOU GOT MONEY TO BUY MORE.: NEVER TRUE

## 2025-01-17 ASSESSMENT — PATIENT HEALTH QUESTIONNAIRE - PHQ9
SUM OF ALL RESPONSES TO PHQ QUESTIONS 1-9: 0
SUM OF ALL RESPONSES TO PHQ9 QUESTIONS 1 & 2: 0
2. FEELING DOWN, DEPRESSED OR HOPELESS: NOT AT ALL
SUM OF ALL RESPONSES TO PHQ QUESTIONS 1-9: 0
1. LITTLE INTEREST OR PLEASURE IN DOING THINGS: NOT AT ALL

## 2025-01-17 NOTE — PROGRESS NOTES
Eosinophils Absolute 10/07/2024 0.48 (H)  0.00 - 0.44 k/uL Final    Basophils Absolute 10/07/2024 0.03  0.00 - 0.20 k/uL Final    Immature Granulocytes Absolute 10/07/2024 0.03  0.00 - 0.30 k/uL Final     No results found for this visit on 01/17/25.    Completed Orders/Prescriptions   No orders of the defined types were placed in this encounter.              AssessmentPlan/Medical Decision Making     Assessment & Plan  1. Headaches.  The headaches may be a side effect of Wegovy, as they worsen with each dose increase. She has been advised to either discontinue Wegovy to see if the headaches improve or continue the medication until her neurology appointment in February to discuss potential medication adjustments. She will follow up with her neurologist in February. She has been instructed to communicate any changes in her condition via NextInput.    2. Blood pressure management.  Her blood pressure has shown improvement. She will maintain her current antihypertensive medication regimen, including amlodipine and propranolol. No refills are needed at this time.    Follow-up  The patient will follow up in July.       Return in about 6 months (around 7/17/2025) for chronic conditions.    1.  Payal received counseling on the following healthy behaviors: nutrition, exercise, and medication adherence  2.  Patient given educational materials - see patient instructions  3.  Was a self-tracking handout given in paper form or via NextInput? No  If yes, see orders or list here.  4.  Discussed use, benefit, and side effects of prescribed medications.  Barriers to medication compliance addressed.  All patient questions answered.  Pt voiced understanding.   5.  Reviewed prior labs, imaging, consultation, follow up, and health maintenance  6.  Continue current medications, diet and exercise.  7. Discussed use, benefit, and side effects of prescribed medications.  Barriers to medication compliance addressed.  All her questions were

## 2025-02-16 NOTE — PROGRESS NOTES
[facility] NEUROLOGY SPECIALIST  [address]  [place] [address]  Dept: [phone]    PATIENT NAME: Payal Sanderson  PATIENT MRN: [id number]  PRIMARY CARE PHYSICIAN: Tammie Lock, APRN - CNP    HPI:        Payal Sanderson is a 44 y.o. female with a history of left paraophthalmic aneurysm s/p coiling and left MCA stroke attribtued to left XIMENA, who I initially evaluated in clinic on 9/23/2024 for migraine. 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  Headaches are pounding and throbbing, rated 7-8/10 in severity, and assocaiaed with nausea, vomiting, photophobia, and phonophobia.  She was initially having > 15 headache days per month with 1-2 headaches per week lasting up to several days.  She has been on atogepant 60 mg daily and amitriptyline 150 mg nightly.  She was not able to tolerate higher doses of amitriptyline due to sedation.  She has been using ubrogepant as abortive therapy.      She is on aspirin 81 mg for secondary stroke prevention after having a left MCA territory stroke with left XIMENA s/p mechanica thrombectomy in January, 2021.  She follows with the endovascular team as well    TODAY'S EVALUTION:    Payal Sanderson  was last seen in clinic by me on 9/23/2024.    She is tolerating amitriptyline 100 mg, propranolol LA 60 mg, ok, but having 3 headache days per week. She stopped atogepant since her last visit with me.  She feels the headaches worsen after each Wegovy injection.  She is due for her next injection this week.  We discussed that she could try taking another once a month injectable medicine, given that she has only tried Ajovy and Emgality.  She states that she is not interested in another injection at this time.  We have also discussed Botox, and she is not interested in this either due to needle phobia.    She is due for a MRA head for monitoring of repaired aneurysm in

## 2025-02-17 ENCOUNTER — OFFICE VISIT (OUTPATIENT)
Dept: NEUROLOGY | Age: 45
End: 2025-02-17
Payer: COMMERCIAL

## 2025-02-17 VITALS
SYSTOLIC BLOOD PRESSURE: 148 MMHG | DIASTOLIC BLOOD PRESSURE: 95 MMHG | BODY MASS INDEX: 37.53 KG/M2 | HEART RATE: 73 BPM | HEIGHT: 63 IN | WEIGHT: 211.8 LBS

## 2025-02-17 DIAGNOSIS — I67.1 ANEURYSM OF INTERNAL CAROTID ARTERY: ICD-10-CM

## 2025-02-17 DIAGNOSIS — G43.019 INTRACTABLE MIGRAINE WITHOUT AURA AND WITHOUT STATUS MIGRAINOSUS: Primary | ICD-10-CM

## 2025-02-17 DIAGNOSIS — Z86.73 H/O: CVA (CEREBROVASCULAR ACCIDENT): ICD-10-CM

## 2025-02-17 DIAGNOSIS — Z86.79 S/P CEREBRAL ANEURYSM REPAIR: ICD-10-CM

## 2025-02-17 DIAGNOSIS — Z98.890 S/P CEREBRAL ANEURYSM REPAIR: ICD-10-CM

## 2025-02-17 PROCEDURE — 1036F TOBACCO NON-USER: CPT | Performed by: PSYCHIATRY & NEUROLOGY

## 2025-02-17 PROCEDURE — 99214 OFFICE O/P EST MOD 30 MIN: CPT | Performed by: PSYCHIATRY & NEUROLOGY

## 2025-02-17 PROCEDURE — G8427 DOCREV CUR MEDS BY ELIG CLIN: HCPCS | Performed by: PSYCHIATRY & NEUROLOGY

## 2025-02-17 PROCEDURE — G8417 CALC BMI ABV UP PARAM F/U: HCPCS | Performed by: PSYCHIATRY & NEUROLOGY

## 2025-02-17 RX ORDER — PROPRANOLOL HYDROCHLORIDE 80 MG/1
80 CAPSULE, EXTENDED RELEASE ORAL DAILY
Qty: 30 CAPSULE | Refills: 3 | Status: SHIPPED | OUTPATIENT
Start: 2025-02-17

## 2025-05-16 NOTE — PROGRESS NOTES
[facility] NEUROLOGY SPECIALIST  [address]  [place] [address]  Dept: [phone]    PATIENT NAME: Payal Sanderson  PATIENT MRN: [id number]  PRIMARY CARE PHYSICIAN: Tammie Lock, APRN - CNP    HPI:      Payal Sanderson is a 44 y.o. female with a history of left paraophthalmic aneurysm s/p coiling and left MCA stroke attribtued to left XIMENA, who I initially evaluated in clinic on 9/23/2024 for migraine. 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  Headaches are pounding and throbbing, rated 7-8/10 in severity, and assocaiaed with nausea, vomiting, photophobia, and phonophobia.  She was initially having > 15 headache days per month with 1-2 headaches per week lasting up to several days.  She has been on atogepant 60 mg daily and amitriptyline 150 mg nightly.  She was not able to tolerate higher doses of amitriptyline due to sedation.  She has been using ubrogepant as abortive therapy.      She is on aspirin 81 mg for secondary stroke prevention after having a left MCA territory stroke with left XIMENA s/p mechanical thrombectomy in January, 2021.  She follows with the endovascular team as well    TODAY'S EVALUTION:    Payal Sanderson  was last seen in clinic by me on 2/17/2025.  She has severe headache at the time of this appointment with photophobia.  She is on amitriptyline 100 mg nightly and propranolol ER 80 mg daily.  She continues to have severe, frequent headaches.  She is not interested in Botox and failed 2 injectable CGRP drugs.  She uses rimgepant for abortive therapy. She is following with Dr. Sargent for repaired cerebral aneurysm.      HEADACHE SUMMARY:  Headache location: bitemporal  Headache quality: pounding and throbbing  Associated factors:  nausea, vomiting, Photophobia, Phonophobia  Intensity: 7-8/10  Headache chronicity: ~25 years  Headache frequency: 1-2 headaches /week that can

## 2025-05-19 ENCOUNTER — OFFICE VISIT (OUTPATIENT)
Dept: NEUROLOGY | Age: 45
End: 2025-05-19
Payer: COMMERCIAL

## 2025-05-19 VITALS
SYSTOLIC BLOOD PRESSURE: 136 MMHG | BODY MASS INDEX: 38.06 KG/M2 | HEIGHT: 63 IN | DIASTOLIC BLOOD PRESSURE: 98 MMHG | HEART RATE: 69 BPM | WEIGHT: 214.8 LBS

## 2025-05-19 DIAGNOSIS — Z86.73 H/O: CVA (CEREBROVASCULAR ACCIDENT): ICD-10-CM

## 2025-05-19 DIAGNOSIS — G43.019 INTRACTABLE MIGRAINE WITHOUT AURA AND WITHOUT STATUS MIGRAINOSUS: Primary | ICD-10-CM

## 2025-05-19 DIAGNOSIS — I67.1 ANEURYSM OF LEFT INTERNAL CAROTID ARTERY: ICD-10-CM

## 2025-05-19 PROCEDURE — 1036F TOBACCO NON-USER: CPT | Performed by: PSYCHIATRY & NEUROLOGY

## 2025-05-19 PROCEDURE — 99214 OFFICE O/P EST MOD 30 MIN: CPT | Performed by: PSYCHIATRY & NEUROLOGY

## 2025-05-19 PROCEDURE — G8427 DOCREV CUR MEDS BY ELIG CLIN: HCPCS | Performed by: PSYCHIATRY & NEUROLOGY

## 2025-05-19 PROCEDURE — G8417 CALC BMI ABV UP PARAM F/U: HCPCS | Performed by: PSYCHIATRY & NEUROLOGY

## 2025-05-19 RX ORDER — ONDANSETRON 2 MG/ML
8 INJECTION INTRAMUSCULAR; INTRAVENOUS
OUTPATIENT
Start: 2025-05-19

## 2025-05-19 RX ORDER — SODIUM CHLORIDE 9 MG/ML
5-250 INJECTION, SOLUTION INTRAVENOUS PRN
OUTPATIENT
Start: 2025-05-19

## 2025-05-19 RX ORDER — ACETAMINOPHEN 325 MG/1
650 TABLET ORAL
OUTPATIENT
Start: 2025-05-19

## 2025-05-19 RX ORDER — SODIUM CHLORIDE 0.9 % (FLUSH) 0.9 %
5-40 SYRINGE (ML) INJECTION PRN
OUTPATIENT
Start: 2025-05-19

## 2025-05-19 RX ORDER — EPINEPHRINE 1 MG/ML
0.3 INJECTION, SOLUTION, CONCENTRATE INTRAVENOUS PRN
OUTPATIENT
Start: 2025-05-19

## 2025-05-19 RX ORDER — HYDROCORTISONE SODIUM SUCCINATE 100 MG/2ML
100 INJECTION INTRAMUSCULAR; INTRAVENOUS
OUTPATIENT
Start: 2025-05-19

## 2025-05-19 RX ORDER — FAMOTIDINE 10 MG/ML
20 INJECTION, SOLUTION INTRAVENOUS
OUTPATIENT
Start: 2025-05-19

## 2025-05-19 RX ORDER — ALBUTEROL SULFATE 90 UG/1
4 INHALANT RESPIRATORY (INHALATION) PRN
OUTPATIENT
Start: 2025-05-19

## 2025-05-19 RX ORDER — DIPHENHYDRAMINE HYDROCHLORIDE 50 MG/ML
50 INJECTION, SOLUTION INTRAMUSCULAR; INTRAVENOUS
OUTPATIENT
Start: 2025-05-19

## 2025-05-19 RX ORDER — DIVALPROEX SODIUM 250 MG/1
250 TABLET, FILM COATED, EXTENDED RELEASE ORAL 2 TIMES DAILY
Qty: 6 TABLET | Refills: 0 | Status: SHIPPED | OUTPATIENT
Start: 2025-05-19 | End: 2025-05-22

## 2025-05-19 RX ORDER — HEPARIN SODIUM (PORCINE) LOCK FLUSH IV SOLN 100 UNIT/ML 100 UNIT/ML
500 SOLUTION INTRAVENOUS PRN
OUTPATIENT
Start: 2025-05-19

## 2025-05-19 RX ORDER — SODIUM CHLORIDE 9 MG/ML
INJECTION, SOLUTION INTRAVENOUS CONTINUOUS
OUTPATIENT
Start: 2025-05-19

## 2025-05-23 ENCOUNTER — TELEPHONE (OUTPATIENT)
Dept: NEUROLOGY | Age: 45
End: 2025-05-23

## 2025-05-23 NOTE — TELEPHONE ENCOUNTER
[employer] start form filled out for patient, therapy plan in place. This has been faxed to [facility] [facility] [employer] [facility]. Patient would like to be infused at . [facility] if approved. Will follow for auth status.

## 2025-06-05 NOTE — TELEPHONE ENCOUNTER
Per insurance patient needs to be infused at a non hospital based infusion center. Spoke with patient she will reach out to her insurance to determine in network locations for the infusion and let us know. Once we have a new location, will send the updated referral to that location. Will forward to Dr. Cooley so she is aware.

## 2025-06-06 ENCOUNTER — HOSPITAL ENCOUNTER (OUTPATIENT)
Dept: MRI IMAGING | Age: 45
Discharge: HOME | End: 2025-06-08
Attending: PSYCHIATRY & NEUROLOGY
Payer: COMMERCIAL

## 2025-06-06 DIAGNOSIS — Z86.79 S/P CEREBRAL ANEURYSM REPAIR: ICD-10-CM

## 2025-06-06 DIAGNOSIS — Z98.890 S/P CEREBRAL ANEURYSM REPAIR: ICD-10-CM

## 2025-06-06 DIAGNOSIS — I67.1 ANEURYSM OF LEFT INTERNAL CAROTID ARTERY: ICD-10-CM

## 2025-06-06 PROCEDURE — 70544 MR ANGIOGRAPHY HEAD W/O DYE: CPT

## 2025-06-10 NOTE — TELEPHONE ENCOUNTER
Attempted to contact patient to see if she had spoken with her insurance yet. No answer, voicemail is not set up.

## 2025-06-16 ENCOUNTER — TELEPHONE (OUTPATIENT)
Dept: NEUROLOGY | Age: 45
End: 2025-06-16

## 2025-06-16 DIAGNOSIS — G43.019 INTRACTABLE MIGRAINE WITHOUT AURA AND WITHOUT STATUS MIGRAINOSUS: Primary | ICD-10-CM

## 2025-06-16 NOTE — TELEPHONE ENCOUNTER
Patient stopped in the office she wants to be infused at [facility] in [place], and this is in network per her insurance company.    [facility] requires a phosphorous level to be checked prior to infusion. Patient is aware and will get her labs checked. Referral form to [facility] completed, awaiting lab result and signature.

## 2025-06-19 ENCOUNTER — RESULTS FOLLOW-UP (OUTPATIENT)
Dept: NEUROLOGY | Age: 45
End: 2025-06-19

## 2025-06-19 ENCOUNTER — HOSPITAL ENCOUNTER (OUTPATIENT)
Age: 45
Setting detail: SPECIMEN
Discharge: HOME | End: 2025-06-19

## 2025-06-19 DIAGNOSIS — G43.019 INTRACTABLE MIGRAINE WITHOUT AURA AND WITHOUT STATUS MIGRAINOSUS: ICD-10-CM

## 2025-06-19 LAB — PHOSPHATE SERPL-MCNC: 3.5 MG/DL (ref 2.5–4.5)

## 2025-06-20 NOTE — TELEPHONE ENCOUNTER
Phosphorous level completed, faxed referral to [facility]. Also sent email to Eloisa to let her know this as well.

## 2025-06-25 ENCOUNTER — TELEPHONE (OUTPATIENT)
Dept: NEUROLOGY | Age: 45
End: 2025-06-25

## 2025-06-27 NOTE — TELEPHONE ENCOUNTER
Form completed, awaiting provider signature.  
Form signed and faxed back.  
[facility] paperwork was received. Blank copy of form has been scanned.   
[phone]

## 2025-07-11 ENCOUNTER — TELEPHONE (OUTPATIENT)
Dept: NEUROLOGY | Age: 45
End: 2025-07-11

## 2025-07-16 DIAGNOSIS — I67.1 ANEURYSM OF INTERNAL CAROTID ARTERY: ICD-10-CM

## 2025-07-16 RX ORDER — ASPIRIN 81 MG/1
81 TABLET ORAL DAILY
Qty: 90 TABLET | Refills: 3 | Status: SHIPPED | OUTPATIENT
Start: 2025-07-16

## 2025-07-16 NOTE — TELEPHONE ENCOUNTER
Incoming fax requesting refill for Aspirin. Please review and e-scribe if applicable.     Last Visit Date: 07/17/2024    Next Visit Date:09/11/25

## 2025-07-21 ENCOUNTER — PATIENT MESSAGE (OUTPATIENT)
Dept: FAMILY MEDICINE CLINIC | Age: 45
End: 2025-07-21

## 2025-07-21 ENCOUNTER — TELEPHONE (OUTPATIENT)
Dept: FAMILY MEDICINE CLINIC | Age: 45
End: 2025-07-21

## 2025-07-21 ENCOUNTER — OFFICE VISIT (OUTPATIENT)
Dept: FAMILY MEDICINE CLINIC | Age: 45
End: 2025-07-21
Payer: COMMERCIAL

## 2025-07-21 VITALS
DIASTOLIC BLOOD PRESSURE: 93 MMHG | WEIGHT: 224 LBS | HEIGHT: 63 IN | SYSTOLIC BLOOD PRESSURE: 127 MMHG | BODY MASS INDEX: 39.69 KG/M2 | TEMPERATURE: 97.3 F | HEART RATE: 65 BPM | OXYGEN SATURATION: 96 %

## 2025-07-21 DIAGNOSIS — I10 PRIMARY HYPERTENSION: Primary | ICD-10-CM

## 2025-07-21 DIAGNOSIS — Z12.31 ENCOUNTER FOR SCREENING MAMMOGRAM FOR MALIGNANT NEOPLASM OF BREAST: ICD-10-CM

## 2025-07-21 DIAGNOSIS — R73.03 PRE-DIABETES: ICD-10-CM

## 2025-07-21 DIAGNOSIS — G43.019 INTRACTABLE MIGRAINE WITHOUT AURA AND WITHOUT STATUS MIGRAINOSUS: ICD-10-CM

## 2025-07-21 PROCEDURE — 3079F DIAST BP 80-89 MM HG: CPT | Performed by: NURSE PRACTITIONER

## 2025-07-21 PROCEDURE — 3074F SYST BP LT 130 MM HG: CPT | Performed by: NURSE PRACTITIONER

## 2025-07-21 PROCEDURE — 99214 OFFICE O/P EST MOD 30 MIN: CPT | Performed by: NURSE PRACTITIONER

## 2025-07-21 PROCEDURE — G8417 CALC BMI ABV UP PARAM F/U: HCPCS | Performed by: NURSE PRACTITIONER

## 2025-07-21 PROCEDURE — 1036F TOBACCO NON-USER: CPT | Performed by: NURSE PRACTITIONER

## 2025-07-21 PROCEDURE — G8427 DOCREV CUR MEDS BY ELIG CLIN: HCPCS | Performed by: NURSE PRACTITIONER

## 2025-07-21 RX ORDER — AMLODIPINE BESYLATE 10 MG/1
10 TABLET ORAL DAILY
Qty: 90 TABLET | Refills: 1 | Status: SHIPPED | OUTPATIENT
Start: 2025-07-21

## 2025-07-21 RX ORDER — ADHESIVE BANDAGE 3/4"
BANDAGE TOPICAL
Qty: 1 EACH | Refills: 1 | Status: SHIPPED | OUTPATIENT
Start: 2025-07-21

## 2025-07-21 SDOH — ECONOMIC STABILITY: FOOD INSECURITY: WITHIN THE PAST 12 MONTHS, THE FOOD YOU BOUGHT JUST DIDN'T LAST AND YOU DIDN'T HAVE MONEY TO GET MORE.: PATIENT DECLINED

## 2025-07-21 SDOH — ECONOMIC STABILITY: FOOD INSECURITY: WITHIN THE PAST 12 MONTHS, YOU WORRIED THAT YOUR FOOD WOULD RUN OUT BEFORE YOU GOT MONEY TO BUY MORE.: PATIENT DECLINED

## 2025-07-21 ASSESSMENT — ENCOUNTER SYMPTOMS
ALLERGIC/IMMUNOLOGIC NEGATIVE: 1
SHORTNESS OF BREATH: 0
CHEST TIGHTNESS: 0
WHEEZING: 0
NAUSEA: 1
COLOR CHANGE: 0
DIARRHEA: 0
PHOTOPHOBIA: 1
CONSTIPATION: 0
VOMITING: 0
COUGH: 0
RESPIRATORY NEGATIVE: 1

## 2025-07-21 ASSESSMENT — PATIENT HEALTH QUESTIONNAIRE - PHQ9
SUM OF ALL RESPONSES TO PHQ QUESTIONS 1-9: 0
1. LITTLE INTEREST OR PLEASURE IN DOING THINGS: NOT AT ALL
2. FEELING DOWN, DEPRESSED OR HOPELESS: NOT AT ALL
SUM OF ALL RESPONSES TO PHQ QUESTIONS 1-9: 0

## 2025-07-22 NOTE — TELEPHONE ENCOUNTER
If you could please sign the rx for the blood pressure kit, I will fax the order to [facility] patient.

## 2025-08-15 ENCOUNTER — HOSPITAL ENCOUNTER (OUTPATIENT)
Dept: MAMMOGRAPHY | Age: 45
Discharge: HOME | End: 2025-08-17
Payer: COMMERCIAL

## 2025-08-15 VITALS — BODY MASS INDEX: 38.64 KG/M2 | HEIGHT: 62 IN | WEIGHT: 210 LBS

## 2025-08-15 DIAGNOSIS — Z12.31 ENCOUNTER FOR SCREENING MAMMOGRAM FOR MALIGNANT NEOPLASM OF BREAST: ICD-10-CM

## 2025-08-15 PROCEDURE — 77063 BREAST TOMOSYNTHESIS BI: CPT

## 2025-08-18 ENCOUNTER — HOSPITAL ENCOUNTER (OUTPATIENT)
Age: 45
Setting detail: SPECIMEN
Discharge: HOME | End: 2025-08-18

## 2025-08-18 LAB
ALBUMIN SERPL-MCNC: 4.3 G/DL (ref 3.5–5.2)
ALBUMIN/GLOB SERPL: 1.2 {RATIO} (ref 1–2.5)
ALP SERPL-CCNC: 61 U/L (ref 35–104)
ALT SERPL-CCNC: 12 U/L (ref 10–35)
ANION GAP SERPL CALCULATED.3IONS-SCNC: 10 MMOL/L (ref 9–16)
AST SERPL-CCNC: 18 U/L (ref 10–35)
BASOPHILS # BLD: 0.04 K/UL (ref 0–0.2)
BASOPHILS NFR BLD: 1 % (ref 0–2)
BILIRUB SERPL-MCNC: 1 MG/DL (ref 0–1.2)
BUN SERPL-MCNC: 6 MG/DL (ref 6–20)
CALCIUM SERPL-MCNC: 9.4 MG/DL (ref 8.6–10.4)
CHLORIDE SERPL-SCNC: 104 MMOL/L (ref 98–107)
CHOLEST SERPL-MCNC: 203 MG/DL (ref 0–199)
CHOLESTEROL/HDL RATIO: 3.2
CO2 SERPL-SCNC: 25 MMOL/L (ref 20–31)
CREAT SERPL-MCNC: 0.9 MG/DL (ref 0.6–0.9)
EOSINOPHIL # BLD: 0.42 K/UL (ref 0–0.44)
EOSINOPHILS RELATIVE PERCENT: 5 % (ref 1–4)
ERYTHROCYTE [DISTWIDTH] IN BLOOD BY AUTOMATED COUNT: 12.7 % (ref 11.8–14.4)
EST. AVERAGE GLUCOSE BLD GHB EST-MCNC: 111 MG/DL
GFR, ESTIMATED: 80 ML/MIN/1.73M2
GLUCOSE P FAST SERPL-MCNC: 89 MG/DL (ref 74–99)
HBA1C MFR BLD: 5.5 % (ref 4–6)
HCT VFR BLD AUTO: 40.4 % (ref 36.3–47.1)
HDLC SERPL-MCNC: 64 MG/DL
HGB BLD-MCNC: 13.8 G/DL (ref 11.9–15.1)
IMM GRANULOCYTES # BLD AUTO: 0.04 K/UL (ref 0–0.3)
IMM GRANULOCYTES NFR BLD: 1 %
LDLC SERPL CALC-MCNC: 126 MG/DL (ref 0–100)
LYMPHOCYTES NFR BLD: 3.12 K/UL (ref 1.1–3.7)
LYMPHOCYTES RELATIVE PERCENT: 36 % (ref 24–43)
MAGNESIUM SERPL-MCNC: 1.9 MG/DL (ref 1.6–2.6)
MCH RBC QN AUTO: 30.1 PG (ref 25.2–33.5)
MCHC RBC AUTO-ENTMCNC: 34.2 G/DL (ref 28.4–34.8)
MCV RBC AUTO: 88 FL (ref 82.6–102.9)
MONOCYTES NFR BLD: 0.75 K/UL (ref 0.1–1.2)
MONOCYTES NFR BLD: 9 % (ref 3–12)
NEUTROPHILS NFR BLD: 48 % (ref 36–65)
NEUTS SEG NFR BLD: 4.25 K/UL (ref 1.5–8.1)
NRBC BLD-RTO: 0 PER 100 WBC
PLATELET # BLD AUTO: 382 K/UL (ref 138–453)
PMV BLD AUTO: 10.2 FL (ref 8.1–13.5)
POTASSIUM SERPL-SCNC: 4 MMOL/L (ref 3.7–5.3)
PROT SERPL-MCNC: 8 G/DL (ref 6.6–8.7)
RBC # BLD AUTO: 4.59 M/UL (ref 3.95–5.11)
SODIUM SERPL-SCNC: 139 MMOL/L (ref 136–145)
TRIGL SERPL-MCNC: 64 MG/DL (ref 0–149)
TSH SERPL DL<=0.05 MIU/L-ACNC: 2.49 UIU/ML (ref 0.27–4.2)
VLDLC SERPL CALC-MCNC: 13 MG/DL (ref 1–30)
WBC OTHER # BLD: 8.6 K/UL (ref 3.5–11.3)

## 2025-08-19 DIAGNOSIS — E78.5 DYSLIPIDEMIA: ICD-10-CM

## 2025-08-19 DIAGNOSIS — Z86.73 HISTORY OF CVA (CEREBROVASCULAR ACCIDENT): ICD-10-CM

## 2025-08-19 RX ORDER — ATORVASTATIN CALCIUM 40 MG/1
40 TABLET, FILM COATED ORAL DAILY
Qty: 90 TABLET | Refills: 3 | Status: SHIPPED | OUTPATIENT
Start: 2025-08-19

## 2025-09-03 ENCOUNTER — OFFICE VISIT (OUTPATIENT)
Dept: NEUROLOGY | Age: 45
End: 2025-09-03
Payer: COMMERCIAL

## 2025-09-03 ENCOUNTER — TELEPHONE (OUTPATIENT)
Dept: NEUROLOGY | Age: 45
End: 2025-09-03

## 2025-09-03 VITALS
BODY MASS INDEX: 39.38 KG/M2 | SYSTOLIC BLOOD PRESSURE: 135 MMHG | WEIGHT: 214 LBS | HEART RATE: 61 BPM | DIASTOLIC BLOOD PRESSURE: 92 MMHG | HEIGHT: 62 IN

## 2025-09-03 DIAGNOSIS — I67.1 ANEURYSM OF INTERNAL CAROTID ARTERY: ICD-10-CM

## 2025-09-03 DIAGNOSIS — Z86.73 H/O: CVA (CEREBROVASCULAR ACCIDENT): ICD-10-CM

## 2025-09-03 DIAGNOSIS — G43.019 INTRACTABLE MIGRAINE WITHOUT AURA AND WITHOUT STATUS MIGRAINOSUS: Primary | ICD-10-CM

## 2025-09-03 PROCEDURE — G8427 DOCREV CUR MEDS BY ELIG CLIN: HCPCS | Performed by: PSYCHIATRY & NEUROLOGY

## 2025-09-03 PROCEDURE — 99214 OFFICE O/P EST MOD 30 MIN: CPT | Performed by: PSYCHIATRY & NEUROLOGY

## 2025-09-03 PROCEDURE — 1036F TOBACCO NON-USER: CPT | Performed by: PSYCHIATRY & NEUROLOGY

## 2025-09-03 PROCEDURE — G8417 CALC BMI ABV UP PARAM F/U: HCPCS | Performed by: PSYCHIATRY & NEUROLOGY

## (undated) DEVICE — NEEDLE ASPIR 22GA L700MM US GUID TREAT DST END FOR

## (undated) DEVICE — SNARE ENDOSCP M L240CM LOOP W27MM SHTH DIA2.4MM OVL FLX

## (undated) DEVICE — STAZ ENDO KIT: Brand: MEDLINE INDUSTRIES, INC.

## (undated) DEVICE — FORCEPS BX L240CM JAW DIA2.2MM RAD JAW 4 HOT DISP

## (undated) DEVICE — FORCEPS BX L100CM DIA1.8MM WRK CHN 2MM PULM S STL RAD JAW 4

## (undated) DEVICE — TRAP SURG QUAD PARABOLA SLOT DSGN SFTY SCRN TRAPEASE

## (undated) DEVICE — GARMENT,MEDLINE,DVT,INT,CALF,MED, GEN2: Brand: MEDLINE